# Patient Record
Sex: MALE | Race: WHITE | NOT HISPANIC OR LATINO | Employment: OTHER | URBAN - NONMETROPOLITAN AREA
[De-identification: names, ages, dates, MRNs, and addresses within clinical notes are randomized per-mention and may not be internally consistent; named-entity substitution may affect disease eponyms.]

---

## 2018-07-09 ENCOUNTER — OFFICE VISIT CONVERTED (OUTPATIENT)
Dept: FAMILY MEDICINE CLINIC | Age: 83
End: 2018-07-09
Attending: FAMILY MEDICINE

## 2019-05-03 ENCOUNTER — HOSPITAL ENCOUNTER (OUTPATIENT)
Dept: OTHER | Facility: HOSPITAL | Age: 84
Discharge: HOME | End: 2019-05-03
Attending: FAMILY MEDICINE

## 2019-05-03 ENCOUNTER — OFFICE VISIT CONVERTED (OUTPATIENT)
Dept: FAMILY MEDICINE CLINIC | Age: 84
End: 2019-05-03
Attending: FAMILY MEDICINE

## 2019-05-03 LAB
CHOLEST SERPL-MCNC: 241 MG/DL (ref 107–200)
CHOLEST/HDLC SERPL: 5 {RATIO} (ref 3–6)
EST. AVERAGE GLUCOSE BLD GHB EST-MCNC: 126 MG/DL
FOLATE SERPL-MCNC: 15.3 NG/ML (ref 4.8–20)
HBA1C MFR BLD: 6 % (ref 3.5–5.7)
HDLC SERPL-MCNC: 48 MG/DL (ref 40–60)
LDLC SERPL CALC-MCNC: 167 MG/DL (ref 70–100)
TRIGL SERPL-MCNC: 132 MG/DL (ref 40–150)
TSH SERPL-ACNC: 1.74 M[IU]/L (ref 0.27–4.2)
VIT B12 SERPL-MCNC: 242 PG/ML (ref 211–911)
VLDLC SERPL-MCNC: 26 MG/DL (ref 5–37)

## 2019-05-05 LAB — CONV TREPONEMA PALLIDUM (RPR) WITH FTA-ABS, TP-PA REFLEXES: NON REACTIVE

## 2019-07-01 ENCOUNTER — OFFICE VISIT CONVERTED (OUTPATIENT)
Dept: FAMILY MEDICINE CLINIC | Age: 84
End: 2019-07-01
Attending: FAMILY MEDICINE

## 2019-10-01 ENCOUNTER — HOSPITAL ENCOUNTER (OUTPATIENT)
Dept: OTHER | Facility: HOSPITAL | Age: 84
Discharge: HOME | End: 2019-10-01
Attending: FAMILY MEDICINE

## 2019-10-01 ENCOUNTER — OFFICE VISIT CONVERTED (OUTPATIENT)
Dept: FAMILY MEDICINE CLINIC | Age: 84
End: 2019-10-01
Attending: FAMILY MEDICINE

## 2019-10-01 LAB
ALBUMIN SERPL-MCNC: 4.1 G/DL (ref 3.5–5)
ALBUMIN/GLOB SERPL: 1.2 {RATIO} (ref 1.4–2.6)
ALP SERPL-CCNC: 65 U/L (ref 56–155)
ALT SERPL-CCNC: 15 U/L (ref 10–40)
ANION GAP SERPL CALC-SCNC: 21 MMOL/L (ref 8–19)
AST SERPL-CCNC: 23 U/L (ref 15–50)
BILIRUB SERPL-MCNC: 0.39 MG/DL (ref 0.2–1.3)
BUN SERPL-MCNC: 23 MG/DL (ref 5–25)
BUN/CREAT SERPL: 14 {RATIO} (ref 6–20)
CALCIUM SERPL-MCNC: 10 MG/DL (ref 8.7–10.4)
CHLORIDE SERPL-SCNC: 101 MMOL/L (ref 99–111)
CHOLEST SERPL-MCNC: 190 MG/DL (ref 107–200)
CHOLEST/HDLC SERPL: 4.2 {RATIO} (ref 3–6)
CONV CO2: 24 MMOL/L (ref 22–32)
CONV TOTAL PROTEIN: 7.4 G/DL (ref 6.3–8.2)
CREAT UR-MCNC: 1.64 MG/DL (ref 0.7–1.2)
GFR SERPLBLD BASED ON 1.73 SQ M-ARVRAT: 37 ML/MIN/{1.73_M2}
GLOBULIN UR ELPH-MCNC: 3.3 G/DL (ref 2–3.5)
GLUCOSE SERPL-MCNC: 99 MG/DL (ref 70–99)
HDLC SERPL-MCNC: 45 MG/DL (ref 40–60)
LDLC SERPL CALC-MCNC: 104 MG/DL (ref 70–100)
OSMOLALITY SERPL CALC.SUM OF ELEC: 294 MOSM/KG (ref 273–304)
POTASSIUM SERPL-SCNC: 5.6 MMOL/L (ref 3.5–5.3)
SODIUM SERPL-SCNC: 140 MMOL/L (ref 135–147)
TRIGL SERPL-MCNC: 204 MG/DL (ref 40–150)
VLDLC SERPL-MCNC: 41 MG/DL (ref 5–37)

## 2019-10-29 ENCOUNTER — OFFICE VISIT CONVERTED (OUTPATIENT)
Dept: FAMILY MEDICINE CLINIC | Age: 84
End: 2019-10-29
Attending: FAMILY MEDICINE

## 2019-12-02 ENCOUNTER — OFFICE VISIT CONVERTED (OUTPATIENT)
Dept: FAMILY MEDICINE CLINIC | Age: 84
End: 2019-12-02
Attending: FAMILY MEDICINE

## 2019-12-02 ENCOUNTER — HOSPITAL ENCOUNTER (OUTPATIENT)
Dept: OTHER | Facility: HOSPITAL | Age: 84
Discharge: HOME | End: 2019-12-02
Attending: FAMILY MEDICINE

## 2019-12-02 LAB
ALBUMIN SERPL-MCNC: 4.1 G/DL (ref 3.5–5)
ALBUMIN/GLOB SERPL: 1.1 {RATIO} (ref 1.4–2.6)
ALP SERPL-CCNC: 72 U/L (ref 56–155)
ALT SERPL-CCNC: 9 U/L (ref 10–40)
ANION GAP SERPL CALC-SCNC: 19 MMOL/L (ref 8–19)
AST SERPL-CCNC: 18 U/L (ref 15–50)
BILIRUB SERPL-MCNC: 0.23 MG/DL (ref 0.2–1.3)
BUN SERPL-MCNC: 26 MG/DL (ref 5–25)
BUN/CREAT SERPL: 19 {RATIO} (ref 6–20)
CALCIUM SERPL-MCNC: 9.4 MG/DL (ref 8.7–10.4)
CHLORIDE SERPL-SCNC: 101 MMOL/L (ref 99–111)
CHOLEST SERPL-MCNC: 250 MG/DL (ref 107–200)
CHOLEST/HDLC SERPL: 5.4 {RATIO} (ref 3–6)
CONV CO2: 26 MMOL/L (ref 22–32)
CONV TOTAL PROTEIN: 7.7 G/DL (ref 6.3–8.2)
CREAT UR-MCNC: 1.4 MG/DL (ref 0.7–1.2)
GFR SERPLBLD BASED ON 1.73 SQ M-ARVRAT: 45 ML/MIN/{1.73_M2}
GLOBULIN UR ELPH-MCNC: 3.6 G/DL (ref 2–3.5)
GLUCOSE SERPL-MCNC: 111 MG/DL (ref 70–99)
HDLC SERPL-MCNC: 46 MG/DL (ref 40–60)
LDLC SERPL CALC-MCNC: 180 MG/DL (ref 70–100)
OSMOLALITY SERPL CALC.SUM OF ELEC: 297 MOSM/KG (ref 273–304)
POTASSIUM SERPL-SCNC: 4.5 MMOL/L (ref 3.5–5.3)
SODIUM SERPL-SCNC: 141 MMOL/L (ref 135–147)
TRIGL SERPL-MCNC: 120 MG/DL (ref 40–150)
VLDLC SERPL-MCNC: 24 MG/DL (ref 5–37)

## 2020-03-31 ENCOUNTER — OFFICE VISIT CONVERTED (OUTPATIENT)
Dept: FAMILY MEDICINE CLINIC | Age: 85
End: 2020-03-31
Attending: FAMILY MEDICINE

## 2020-05-26 ENCOUNTER — HOSPITAL ENCOUNTER (OUTPATIENT)
Dept: OTHER | Facility: HOSPITAL | Age: 85
Discharge: HOME | End: 2020-05-26
Attending: FAMILY MEDICINE

## 2020-05-26 ENCOUNTER — OFFICE VISIT CONVERTED (OUTPATIENT)
Dept: FAMILY MEDICINE CLINIC | Age: 85
End: 2020-05-26
Attending: FAMILY MEDICINE

## 2020-05-26 LAB
ALBUMIN SERPL-MCNC: 4.2 G/DL (ref 3.5–5)
ALBUMIN/GLOB SERPL: 1.2 {RATIO} (ref 1.4–2.6)
ALP SERPL-CCNC: 71 U/L (ref 56–155)
ALT SERPL-CCNC: 10 U/L (ref 10–40)
ANION GAP SERPL CALC-SCNC: 15 MMOL/L (ref 8–19)
AST SERPL-CCNC: 20 U/L (ref 15–50)
BASOPHILS # BLD MANUAL: 0.11 10*3/UL (ref 0–0.2)
BASOPHILS NFR BLD MANUAL: 1.8 % (ref 0–3)
BILIRUB SERPL-MCNC: 0.29 MG/DL (ref 0.2–1.3)
BUN SERPL-MCNC: 20 MG/DL (ref 5–25)
BUN/CREAT SERPL: 12 {RATIO} (ref 6–20)
CALCIUM SERPL-MCNC: 9.5 MG/DL (ref 8.7–10.4)
CHLORIDE SERPL-SCNC: 103 MMOL/L (ref 99–111)
CHOLEST SERPL-MCNC: 223 MG/DL (ref 107–200)
CHOLEST/HDLC SERPL: 5.6 {RATIO} (ref 3–6)
CK SERPL-CCNC: 124 U/L (ref 57–374)
CONV CO2: 27 MMOL/L (ref 22–32)
CONV TOTAL PROTEIN: 7.7 G/DL (ref 6.3–8.2)
CREAT UR-MCNC: 1.61 MG/DL (ref 0.7–1.2)
DEPRECATED RDW RBC AUTO: 49.6 FL
EOSINOPHIL # BLD MANUAL: 0.6 10*3/UL (ref 0–0.7)
EOSINOPHIL NFR BLD MANUAL: 9.6 % (ref 0–7)
ERYTHROCYTE [DISTWIDTH] IN BLOOD BY AUTOMATED COUNT: 14.1 % (ref 11.5–14.5)
EST. AVERAGE GLUCOSE BLD GHB EST-MCNC: 151 MG/DL
GFR SERPLBLD BASED ON 1.73 SQ M-ARVRAT: 38 ML/MIN/{1.73_M2}
GLOBULIN UR ELPH-MCNC: 3.5 G/DL (ref 2–3.5)
GLUCOSE SERPL-MCNC: 99 MG/DL (ref 70–99)
GRANS (ABSOLUTE): 3.82 10*3/UL (ref 2–8)
GRANS: 60.8 % (ref 30–85)
HBA1C MFR BLD: 13.2 G/DL (ref 14–18)
HBA1C MFR BLD: 6.9 % (ref 3.5–5.7)
HCT VFR BLD AUTO: 41.1 % (ref 42–52)
HDLC SERPL-MCNC: 40 MG/DL (ref 40–60)
IMM GRANULOCYTES # BLD: 0.01 10*3/UL (ref 0–0.54)
IMM GRANULOCYTES NFR BLD: 0.2 % (ref 0–0.43)
LDLC SERPL CALC-MCNC: 147 MG/DL (ref 70–100)
LYMPHOCYTES # BLD MANUAL: 1.12 10*3/UL (ref 1–5)
LYMPHOCYTES NFR BLD MANUAL: 9.7 % (ref 3–10)
MCH RBC QN AUTO: 30.6 PG (ref 27–31)
MCHC RBC AUTO-ENTMCNC: 32.1 G/DL (ref 33–37)
MCV RBC AUTO: 95.1 FL (ref 80–96)
MONOCYTES # BLD AUTO: 0.61 10*3/UL (ref 0.2–1.2)
OSMOLALITY SERPL CALC.SUM OF ELEC: 293 MOSM/KG (ref 273–304)
PLATELET # BLD AUTO: 196 10*3/UL (ref 130–400)
PMV BLD AUTO: 9.1 FL (ref 7.4–10.4)
POTASSIUM SERPL-SCNC: 5.1 MMOL/L (ref 3.5–5.3)
RBC # BLD AUTO: 4.32 10*6/UL (ref 4.7–6.1)
SODIUM SERPL-SCNC: 140 MMOL/L (ref 135–147)
TRIGL SERPL-MCNC: 182 MG/DL (ref 40–150)
VARIANT LYMPHS NFR BLD MANUAL: 17.9 % (ref 20–45)
VLDLC SERPL-MCNC: 36 MG/DL (ref 5–37)
WBC # BLD AUTO: 6.27 10*3/UL (ref 4.8–10.8)

## 2020-05-27 LAB
ASO AB SERPL-ACNC: 20 [IU]/ML (ref 0–200)
CONV RHEUMATOID FACTOR IGM: <10 [IU]/ML (ref 0–14)
CRP SERPL-MCNC: 3.3 MG/L (ref 0–5)
DSDNA AB SER-ACNC: NEGATIVE [IU]/ML
ENA AB SER IA-ACNC: NEGATIVE {RATIO}
ERYTHROCYTE [SEDIMENTATION RATE] IN BLOOD: 16 MM/H (ref 0–20)
PHOSPHATE SERPL-MCNC: 3.8 MG/DL (ref 2.4–4.5)
URATE SERPL-MCNC: 5 MG/DL (ref 3.5–8.5)

## 2020-07-06 ENCOUNTER — OFFICE VISIT CONVERTED (OUTPATIENT)
Dept: PLASTIC SURGERY | Facility: CLINIC | Age: 85
End: 2020-07-06
Attending: PLASTIC SURGERY

## 2020-07-06 ENCOUNTER — CONVERSION ENCOUNTER (OUTPATIENT)
Dept: PLASTIC SURGERY | Facility: CLINIC | Age: 85
End: 2020-07-06

## 2020-07-17 ENCOUNTER — OFFICE VISIT CONVERTED (OUTPATIENT)
Dept: FAMILY MEDICINE CLINIC | Age: 85
End: 2020-07-17
Attending: FAMILY MEDICINE

## 2020-07-30 ENCOUNTER — PROCEDURE VISIT CONVERTED (OUTPATIENT)
Dept: PLASTIC SURGERY | Facility: CLINIC | Age: 85
End: 2020-07-30
Attending: PLASTIC SURGERY

## 2020-07-30 ENCOUNTER — HOSPITAL ENCOUNTER (OUTPATIENT)
Dept: OTHER | Facility: HOSPITAL | Age: 85
Discharge: HOME | End: 2020-07-30
Attending: PLASTIC SURGERY

## 2020-08-06 ENCOUNTER — CONVERSION ENCOUNTER (OUTPATIENT)
Dept: PLASTIC SURGERY | Facility: CLINIC | Age: 85
End: 2020-08-06

## 2020-08-06 ENCOUNTER — OFFICE VISIT CONVERTED (OUTPATIENT)
Dept: PLASTIC SURGERY | Facility: CLINIC | Age: 85
End: 2020-08-06
Attending: PLASTIC SURGERY

## 2020-09-28 ENCOUNTER — CONVERSION ENCOUNTER (OUTPATIENT)
Dept: PLASTIC SURGERY | Facility: CLINIC | Age: 85
End: 2020-09-28

## 2020-09-28 ENCOUNTER — OFFICE VISIT CONVERTED (OUTPATIENT)
Dept: PLASTIC SURGERY | Facility: CLINIC | Age: 85
End: 2020-09-28
Attending: PLASTIC SURGERY

## 2020-10-28 ENCOUNTER — HOSPITAL ENCOUNTER (OUTPATIENT)
Dept: OTHER | Facility: HOSPITAL | Age: 85
Discharge: HOME | End: 2020-10-28
Attending: NURSE PRACTITIONER

## 2020-10-28 ENCOUNTER — OFFICE VISIT CONVERTED (OUTPATIENT)
Dept: FAMILY MEDICINE CLINIC | Age: 85
End: 2020-10-28
Attending: NURSE PRACTITIONER

## 2020-11-02 ENCOUNTER — HOSPITAL ENCOUNTER (OUTPATIENT)
Dept: OTHER | Facility: HOSPITAL | Age: 85
Discharge: HOME | End: 2020-11-02
Attending: FAMILY MEDICINE

## 2020-11-02 ENCOUNTER — OFFICE VISIT CONVERTED (OUTPATIENT)
Dept: FAMILY MEDICINE CLINIC | Age: 85
End: 2020-11-02
Attending: FAMILY MEDICINE

## 2020-11-02 LAB
ALBUMIN SERPL-MCNC: 4 G/DL (ref 3.5–5)
ALBUMIN/GLOB SERPL: 1.3 {RATIO} (ref 1.4–2.6)
ALP SERPL-CCNC: 75 U/L (ref 56–155)
ALT SERPL-CCNC: 11 U/L (ref 10–40)
ANION GAP SERPL CALC-SCNC: 13 MMOL/L (ref 8–19)
AST SERPL-CCNC: 21 U/L (ref 15–50)
BILIRUB SERPL-MCNC: 0.33 MG/DL (ref 0.2–1.3)
BUN SERPL-MCNC: 31 MG/DL (ref 5–25)
BUN/CREAT SERPL: 21 {RATIO} (ref 6–20)
CALCIUM SERPL-MCNC: 9.7 MG/DL (ref 8.7–10.4)
CHLORIDE SERPL-SCNC: 105 MMOL/L (ref 99–111)
CHOLEST SERPL-MCNC: 160 MG/DL (ref 107–200)
CHOLEST/HDLC SERPL: 3.5 {RATIO} (ref 3–6)
CONV CO2: 29 MMOL/L (ref 22–32)
CONV TOTAL PROTEIN: 7.2 G/DL (ref 6.3–8.2)
CREAT UR-MCNC: 1.45 MG/DL (ref 0.7–1.2)
EST. AVERAGE GLUCOSE BLD GHB EST-MCNC: 163 MG/DL
GFR SERPLBLD BASED ON 1.73 SQ M-ARVRAT: 43 ML/MIN/{1.73_M2}
GLOBULIN UR ELPH-MCNC: 3.2 G/DL (ref 2–3.5)
GLUCOSE SERPL-MCNC: 102 MG/DL (ref 70–99)
HBA1C MFR BLD: 7.3 % (ref 3.5–5.7)
HDLC SERPL-MCNC: 46 MG/DL (ref 40–60)
LDLC SERPL CALC-MCNC: 85 MG/DL (ref 70–100)
OSMOLALITY SERPL CALC.SUM OF ELEC: 301 MOSM/KG (ref 273–304)
POTASSIUM SERPL-SCNC: 5.2 MMOL/L (ref 3.5–5.3)
SODIUM SERPL-SCNC: 142 MMOL/L (ref 135–147)
TRIGL SERPL-MCNC: 145 MG/DL (ref 40–150)
VLDLC SERPL-MCNC: 29 MG/DL (ref 5–37)

## 2020-11-09 ENCOUNTER — OFFICE VISIT (OUTPATIENT)
Dept: ORTHOPEDIC SURGERY | Facility: CLINIC | Age: 85
End: 2020-11-09

## 2020-11-09 VITALS — BODY MASS INDEX: 25.2 KG/M2 | WEIGHT: 180 LBS | HEIGHT: 71 IN | TEMPERATURE: 98 F

## 2020-11-09 DIAGNOSIS — M25.511 ACUTE PAIN OF RIGHT SHOULDER: Primary | ICD-10-CM

## 2020-11-09 DIAGNOSIS — M25.511 TRIGGER POINT OF RIGHT SHOULDER REGION: ICD-10-CM

## 2020-11-09 PROCEDURE — 20610 DRAIN/INJ JOINT/BURSA W/O US: CPT | Performed by: PHYSICIAN ASSISTANT

## 2020-11-09 PROCEDURE — 99203 OFFICE O/P NEW LOW 30 MIN: CPT | Performed by: PHYSICIAN ASSISTANT

## 2020-11-09 RX ORDER — TRAMADOL HYDROCHLORIDE 50 MG/1
TABLET ORAL
Qty: 40 TABLET | Refills: 0 | Status: SHIPPED | OUTPATIENT
Start: 2020-11-09 | End: 2021-07-28 | Stop reason: SDUPTHER

## 2020-11-09 RX ORDER — PRAVASTATIN SODIUM 40 MG
TABLET ORAL
COMMUNITY
Start: 2020-08-10 | End: 2021-01-01

## 2020-11-09 RX ORDER — ALLOPURINOL 100 MG/1
100 TABLET ORAL DAILY
COMMUNITY
Start: 2020-08-24 | End: 2021-09-08

## 2020-11-09 RX ORDER — TRAMADOL HYDROCHLORIDE 50 MG/1
TABLET ORAL
COMMUNITY
Start: 2020-10-28 | End: 2020-11-09 | Stop reason: SDUPTHER

## 2020-11-09 RX ORDER — BUMETANIDE 0.5 MG/1
0.5 TABLET ORAL DAILY
COMMUNITY
Start: 2020-08-10 | End: 2021-09-08 | Stop reason: SDUPTHER

## 2020-11-09 RX ORDER — AMLODIPINE BESYLATE 5 MG/1
TABLET ORAL
COMMUNITY
Start: 2020-08-10 | End: 2021-09-20 | Stop reason: DRUGHIGH

## 2020-11-09 RX ORDER — HYDROXYZINE HYDROCHLORIDE 25 MG/1
TABLET, FILM COATED ORAL
COMMUNITY
Start: 2020-11-04 | End: 2021-01-01

## 2020-11-09 RX ORDER — CLOPIDOGREL BISULFATE 75 MG/1
75 TABLET ORAL DAILY
COMMUNITY
Start: 2020-08-18 | End: 2022-01-01 | Stop reason: SDUPTHER

## 2020-11-09 RX ORDER — METOPROLOL SUCCINATE 50 MG/1
50 TABLET, EXTENDED RELEASE ORAL DAILY
COMMUNITY
Start: 2020-08-10 | End: 2021-01-01 | Stop reason: SDUPTHER

## 2020-11-09 RX ORDER — QUETIAPINE FUMARATE 25 MG/1
0.5 TABLET, FILM COATED ORAL NIGHTLY
COMMUNITY
Start: 2020-11-02 | End: 2021-01-01 | Stop reason: SDUPTHER

## 2020-11-09 RX ORDER — GABAPENTIN 300 MG/1
300 CAPSULE ORAL 3 TIMES DAILY
COMMUNITY
Start: 2020-11-03 | End: 2021-01-01 | Stop reason: SDUPTHER

## 2020-11-09 RX ADMIN — METHYLPREDNISOLONE ACETATE 160 MG: 80 INJECTION, SUSPENSION INTRA-ARTICULAR; INTRALESIONAL; INTRAMUSCULAR; SOFT TISSUE at 09:58

## 2020-11-10 PROBLEM — M25.511 ACUTE PAIN OF RIGHT SHOULDER: Status: ACTIVE | Noted: 2020-11-10

## 2020-11-10 PROBLEM — M25.511 TRIGGER POINT OF RIGHT SHOULDER REGION: Status: ACTIVE | Noted: 2020-11-10

## 2020-11-10 RX ORDER — METHYLPREDNISOLONE ACETATE 80 MG/ML
80 INJECTION, SUSPENSION INTRA-ARTICULAR; INTRALESIONAL; INTRAMUSCULAR; SOFT TISSUE
Status: COMPLETED | OUTPATIENT
Start: 2020-11-10 | End: 2020-11-10

## 2020-11-10 RX ORDER — METHYLPREDNISOLONE ACETATE 80 MG/ML
160 INJECTION, SUSPENSION INTRA-ARTICULAR; INTRALESIONAL; INTRAMUSCULAR; SOFT TISSUE
Status: COMPLETED | OUTPATIENT
Start: 2020-11-09 | End: 2020-11-09

## 2020-11-10 RX ORDER — LIDOCAINE HYDROCHLORIDE 20 MG/ML
1 INJECTION, SOLUTION INTRAVENOUS
Status: COMPLETED | OUTPATIENT
Start: 2020-11-10 | End: 2020-11-10

## 2020-11-10 RX ADMIN — METHYLPREDNISOLONE ACETATE 80 MG: 80 INJECTION, SUSPENSION INTRA-ARTICULAR; INTRALESIONAL; INTRAMUSCULAR; SOFT TISSUE at 05:19

## 2020-11-10 RX ADMIN — LIDOCAINE HYDROCHLORIDE 1 ML: 20 INJECTION, SOLUTION INTRAVENOUS at 05:19

## 2021-01-01 ENCOUNTER — OFFICE VISIT (OUTPATIENT)
Dept: FAMILY MEDICINE CLINIC | Age: 86
End: 2021-01-01

## 2021-01-01 ENCOUNTER — TELEPHONE (OUTPATIENT)
Dept: FAMILY MEDICINE CLINIC | Age: 86
End: 2021-01-01

## 2021-01-01 ENCOUNTER — PATIENT OUTREACH (OUTPATIENT)
Dept: CASE MANAGEMENT | Facility: OTHER | Age: 86
End: 2021-01-01

## 2021-01-01 ENCOUNTER — TELEPHONE (OUTPATIENT)
Dept: CASE MANAGEMENT | Facility: OTHER | Age: 86
End: 2021-01-01

## 2021-01-01 ENCOUNTER — READMISSION MANAGEMENT (OUTPATIENT)
Dept: CALL CENTER | Facility: HOSPITAL | Age: 86
End: 2021-01-01

## 2021-01-01 ENCOUNTER — TRANSITIONAL CARE MANAGEMENT TELEPHONE ENCOUNTER (OUTPATIENT)
Dept: CALL CENTER | Facility: HOSPITAL | Age: 86
End: 2021-01-01

## 2021-01-01 ENCOUNTER — LAB (OUTPATIENT)
Dept: LAB | Facility: HOSPITAL | Age: 86
End: 2021-01-01

## 2021-01-01 VITALS
TEMPERATURE: 97.7 F | OXYGEN SATURATION: 91 % | WEIGHT: 192 LBS | DIASTOLIC BLOOD PRESSURE: 75 MMHG | BODY MASS INDEX: 26.01 KG/M2 | SYSTOLIC BLOOD PRESSURE: 147 MMHG | HEART RATE: 69 BPM | HEIGHT: 72 IN

## 2021-01-01 VITALS
WEIGHT: 197.6 LBS | BODY MASS INDEX: 26.76 KG/M2 | DIASTOLIC BLOOD PRESSURE: 61 MMHG | SYSTOLIC BLOOD PRESSURE: 144 MMHG | OXYGEN SATURATION: 88 % | HEIGHT: 72 IN | TEMPERATURE: 97.4 F | HEART RATE: 61 BPM

## 2021-01-01 VITALS
DIASTOLIC BLOOD PRESSURE: 57 MMHG | HEIGHT: 72 IN | OXYGEN SATURATION: 96 % | BODY MASS INDEX: 24.81 KG/M2 | WEIGHT: 183.2 LBS | HEART RATE: 91 BPM | SYSTOLIC BLOOD PRESSURE: 132 MMHG

## 2021-01-01 DIAGNOSIS — I50.9 CONGESTIVE HEART FAILURE, UNSPECIFIED HF CHRONICITY, UNSPECIFIED HEART FAILURE TYPE (HCC): Primary | ICD-10-CM

## 2021-01-01 DIAGNOSIS — G30.9 ALZHEIMER DISEASE (HCC): ICD-10-CM

## 2021-01-01 DIAGNOSIS — E78.2 MIXED HYPERLIPIDEMIA: ICD-10-CM

## 2021-01-01 DIAGNOSIS — I73.9 PAD (PERIPHERAL ARTERY DISEASE) (HCC): ICD-10-CM

## 2021-01-01 DIAGNOSIS — N17.9 ACUTE RENAL FAILURE SUPERIMPOSED ON STAGE 4 CHRONIC KIDNEY DISEASE, UNSPECIFIED ACUTE RENAL FAILURE TYPE (HCC): Primary | ICD-10-CM

## 2021-01-01 DIAGNOSIS — F02.80 ALZHEIMER DISEASE (HCC): ICD-10-CM

## 2021-01-01 DIAGNOSIS — N18.30 STAGE 3 CHRONIC KIDNEY DISEASE, UNSPECIFIED WHETHER STAGE 3A OR 3B CKD (HCC): Primary | ICD-10-CM

## 2021-01-01 DIAGNOSIS — R91.1 LUNG NODULE: ICD-10-CM

## 2021-01-01 DIAGNOSIS — I10 HYPERTENSION, UNSPECIFIED TYPE: Primary | ICD-10-CM

## 2021-01-01 DIAGNOSIS — Z87.891 HISTORY OF SMOKING: ICD-10-CM

## 2021-01-01 DIAGNOSIS — N18.30 STAGE 3 CHRONIC KIDNEY DISEASE, UNSPECIFIED WHETHER STAGE 3A OR 3B CKD (HCC): ICD-10-CM

## 2021-01-01 DIAGNOSIS — N18.32 STAGE 3B CHRONIC KIDNEY DISEASE (HCC): Primary | ICD-10-CM

## 2021-01-01 DIAGNOSIS — I10 PRIMARY HYPERTENSION: ICD-10-CM

## 2021-01-01 DIAGNOSIS — E11.59 TYPE 2 DIABETES MELLITUS WITH OTHER CIRCULATORY COMPLICATION, WITHOUT LONG-TERM CURRENT USE OF INSULIN (HCC): ICD-10-CM

## 2021-01-01 DIAGNOSIS — I50.9 CHRONIC CONGESTIVE HEART FAILURE, UNSPECIFIED HEART FAILURE TYPE (HCC): ICD-10-CM

## 2021-01-01 DIAGNOSIS — N18.31 STAGE 3A CHRONIC KIDNEY DISEASE (HCC): Primary | ICD-10-CM

## 2021-01-01 DIAGNOSIS — N18.31 STAGE 3A CHRONIC KIDNEY DISEASE (HCC): ICD-10-CM

## 2021-01-01 DIAGNOSIS — M25.511 ACUTE PAIN OF RIGHT SHOULDER: ICD-10-CM

## 2021-01-01 DIAGNOSIS — E11.9 TYPE 2 DIABETES MELLITUS WITHOUT COMPLICATION, WITHOUT LONG-TERM CURRENT USE OF INSULIN (HCC): ICD-10-CM

## 2021-01-01 DIAGNOSIS — N28.9 KIDNEY DISEASE: Primary | ICD-10-CM

## 2021-01-01 DIAGNOSIS — G62.9 PERIPHERAL NERVE DISEASE: Primary | ICD-10-CM

## 2021-01-01 DIAGNOSIS — R06.02 SHORTNESS OF BREATH: Primary | ICD-10-CM

## 2021-01-01 DIAGNOSIS — I50.9 CONGESTIVE HEART FAILURE, UNSPECIFIED HF CHRONICITY, UNSPECIFIED HEART FAILURE TYPE (HCC): ICD-10-CM

## 2021-01-01 DIAGNOSIS — I10 HYPERTENSION, UNSPECIFIED TYPE: ICD-10-CM

## 2021-01-01 DIAGNOSIS — N18.4 ACUTE RENAL FAILURE SUPERIMPOSED ON STAGE 4 CHRONIC KIDNEY DISEASE, UNSPECIFIED ACUTE RENAL FAILURE TYPE (HCC): Primary | ICD-10-CM

## 2021-01-01 LAB
ALBUMIN SERPL-MCNC: 3.6 G/DL (ref 3.5–5.2)
ALBUMIN SERPL-MCNC: 4 G/DL (ref 3.5–5.2)
ALBUMIN/GLOB SERPL: 1 G/DL
ALBUMIN/GLOB SERPL: 1.2 G/DL
ALP SERPL-CCNC: 89 U/L (ref 39–117)
ALP SERPL-CCNC: 93 U/L (ref 39–117)
ALT SERPL W P-5'-P-CCNC: 15 U/L (ref 1–41)
ALT SERPL W P-5'-P-CCNC: 17 U/L (ref 1–41)
ANION GAP SERPL CALCULATED.3IONS-SCNC: 10.8 MMOL/L (ref 5–15)
ANION GAP SERPL CALCULATED.3IONS-SCNC: 5.2 MMOL/L (ref 5–15)
ANION GAP SERPL CALCULATED.3IONS-SCNC: 7.5 MMOL/L (ref 5–15)
AST SERPL-CCNC: 19 U/L (ref 1–40)
AST SERPL-CCNC: 20 U/L (ref 1–40)
BASOPHILS # BLD AUTO: 0.08 10*3/MM3 (ref 0–0.2)
BASOPHILS # BLD AUTO: 0.08 10*3/MM3 (ref 0–0.2)
BASOPHILS NFR BLD AUTO: 0.9 % (ref 0–1.5)
BASOPHILS NFR BLD AUTO: 1.2 % (ref 0–1.5)
BILIRUB SERPL-MCNC: 0.2 MG/DL (ref 0–1.2)
BILIRUB SERPL-MCNC: 0.2 MG/DL (ref 0–1.2)
BUN SERPL-MCNC: 34 MG/DL (ref 8–23)
BUN SERPL-MCNC: 41 MG/DL (ref 8–23)
BUN SERPL-MCNC: 47 MG/DL (ref 8–23)
BUN/CREAT SERPL: 21.1 (ref 7–25)
BUN/CREAT SERPL: 22 (ref 7–25)
BUN/CREAT SERPL: 22.3 (ref 7–25)
CALCIUM SPEC-SCNC: 9 MG/DL (ref 8.6–10.5)
CALCIUM SPEC-SCNC: 9.2 MG/DL (ref 8.6–10.5)
CALCIUM SPEC-SCNC: 9.6 MG/DL (ref 8.6–10.5)
CHLORIDE SERPL-SCNC: 102 MMOL/L (ref 98–107)
CHLORIDE SERPL-SCNC: 103 MMOL/L (ref 98–107)
CHLORIDE SERPL-SCNC: 108 MMOL/L (ref 98–107)
CHOLEST SERPL-MCNC: 145 MG/DL (ref 0–200)
CO2 SERPL-SCNC: 30.5 MMOL/L (ref 22–29)
CO2 SERPL-SCNC: 31.2 MMOL/L (ref 22–29)
CO2 SERPL-SCNC: 33.8 MMOL/L (ref 22–29)
CREAT SERPL-MCNC: 1.61 MG/DL (ref 0.76–1.27)
CREAT SERPL-MCNC: 1.86 MG/DL (ref 0.76–1.27)
CREAT SERPL-MCNC: 2.11 MG/DL (ref 0.76–1.27)
DEPRECATED RDW RBC AUTO: 51.5 FL (ref 37–54)
DEPRECATED RDW RBC AUTO: 53.2 FL (ref 37–54)
EOSINOPHIL # BLD AUTO: 0.58 10*3/MM3 (ref 0–0.4)
EOSINOPHIL # BLD AUTO: 0.71 10*3/MM3 (ref 0–0.4)
EOSINOPHIL NFR BLD AUTO: 10.4 % (ref 0.3–6.2)
EOSINOPHIL NFR BLD AUTO: 6.7 % (ref 0.3–6.2)
ERYTHROCYTE [DISTWIDTH] IN BLOOD BY AUTOMATED COUNT: 14.1 % (ref 12.3–15.4)
ERYTHROCYTE [DISTWIDTH] IN BLOOD BY AUTOMATED COUNT: 15.1 % (ref 12.3–15.4)
GFR SERPL CREATININE-BSD FRML MDRD: 30 ML/MIN/1.73
GFR SERPL CREATININE-BSD FRML MDRD: 34 ML/MIN/1.73
GFR SERPL CREATININE-BSD FRML MDRD: 41 ML/MIN/1.73
GLOBULIN UR ELPH-MCNC: 3.3 GM/DL
GLOBULIN UR ELPH-MCNC: 3.6 GM/DL
GLUCOSE SERPL-MCNC: 136 MG/DL (ref 65–99)
GLUCOSE SERPL-MCNC: 156 MG/DL (ref 65–99)
GLUCOSE SERPL-MCNC: 222 MG/DL (ref 65–99)
HBA1C MFR BLD: 7 % (ref 4.8–5.6)
HBA1C MFR BLD: 7.47 % (ref 4.8–5.6)
HCT VFR BLD AUTO: 39.5 % (ref 37.5–51)
HCT VFR BLD AUTO: 41.1 % (ref 37.5–51)
HDLC SERPL-MCNC: 38 MG/DL (ref 40–60)
HGB BLD-MCNC: 12.1 G/DL (ref 13–17.7)
HGB BLD-MCNC: 13 G/DL (ref 13–17.7)
IMM GRANULOCYTES # BLD AUTO: 0.02 10*3/MM3 (ref 0–0.05)
IMM GRANULOCYTES # BLD AUTO: 0.02 10*3/MM3 (ref 0–0.05)
IMM GRANULOCYTES NFR BLD AUTO: 0.2 % (ref 0–0.5)
IMM GRANULOCYTES NFR BLD AUTO: 0.3 % (ref 0–0.5)
LDLC SERPL CALC-MCNC: 74 MG/DL (ref 0–100)
LDLC/HDLC SERPL: 1.78 {RATIO}
LYMPHOCYTES # BLD AUTO: 0.78 10*3/MM3 (ref 0.7–3.1)
LYMPHOCYTES # BLD AUTO: 0.96 10*3/MM3 (ref 0.7–3.1)
LYMPHOCYTES NFR BLD AUTO: 11.1 % (ref 19.6–45.3)
LYMPHOCYTES NFR BLD AUTO: 11.4 % (ref 19.6–45.3)
MCH RBC QN AUTO: 30.2 PG (ref 26.6–33)
MCH RBC QN AUTO: 30.5 PG (ref 26.6–33)
MCHC RBC AUTO-ENTMCNC: 30.6 G/DL (ref 31.5–35.7)
MCHC RBC AUTO-ENTMCNC: 31.6 G/DL (ref 31.5–35.7)
MCV RBC AUTO: 95.6 FL (ref 79–97)
MCV RBC AUTO: 99.5 FL (ref 79–97)
MONOCYTES # BLD AUTO: 0.85 10*3/MM3 (ref 0.1–0.9)
MONOCYTES # BLD AUTO: 0.86 10*3/MM3 (ref 0.1–0.9)
MONOCYTES NFR BLD AUTO: 12.6 % (ref 5–12)
MONOCYTES NFR BLD AUTO: 9.8 % (ref 5–12)
NEUTROPHILS NFR BLD AUTO: 4.4 10*3/MM3 (ref 1.7–7)
NEUTROPHILS NFR BLD AUTO: 6.16 10*3/MM3 (ref 1.7–7)
NEUTROPHILS NFR BLD AUTO: 64.1 % (ref 42.7–76)
NEUTROPHILS NFR BLD AUTO: 71.3 % (ref 42.7–76)
NT-PROBNP SERPL-MCNC: 375.1 PG/ML (ref 0–1800)
PLATELET # BLD AUTO: 163 10*3/MM3 (ref 140–450)
PLATELET # BLD AUTO: 222 10*3/MM3 (ref 140–450)
PMV BLD AUTO: 10.3 FL (ref 6–12)
PMV BLD AUTO: 10.6 FL (ref 6–12)
POTASSIUM SERPL-SCNC: 4.9 MMOL/L (ref 3.5–5.2)
POTASSIUM SERPL-SCNC: 4.9 MMOL/L (ref 3.5–5.2)
POTASSIUM SERPL-SCNC: 5 MMOL/L (ref 3.5–5.2)
PROT SERPL-MCNC: 7.2 G/DL (ref 6–8.5)
PROT SERPL-MCNC: 7.3 G/DL (ref 6–8.5)
RBC # BLD AUTO: 3.97 10*6/MM3 (ref 4.14–5.8)
RBC # BLD AUTO: 4.3 10*6/MM3 (ref 4.14–5.8)
SODIUM SERPL-SCNC: 142 MMOL/L (ref 136–145)
SODIUM SERPL-SCNC: 144 MMOL/L (ref 136–145)
SODIUM SERPL-SCNC: 146 MMOL/L (ref 136–145)
TRIGL SERPL-MCNC: 197 MG/DL (ref 0–150)
VLDLC SERPL-MCNC: 33 MG/DL (ref 5–40)
WBC # BLD AUTO: 8.65 10*3/MM3 (ref 3.4–10.8)
WBC NRBC COR # BLD: 6.85 10*3/MM3 (ref 3.4–10.8)

## 2021-01-01 PROCEDURE — 83036 HEMOGLOBIN GLYCOSYLATED A1C: CPT

## 2021-01-01 PROCEDURE — 99439 CHRNC CARE MGMT STAF EA ADDL: CPT | Performed by: FAMILY MEDICINE

## 2021-01-01 PROCEDURE — 99213 OFFICE O/P EST LOW 20 MIN: CPT | Performed by: NURSE PRACTITIONER

## 2021-01-01 PROCEDURE — 99495 TRANSJ CARE MGMT MOD F2F 14D: CPT | Performed by: FAMILY MEDICINE

## 2021-01-01 PROCEDURE — 1111F DSCHRG MED/CURRENT MED MERGE: CPT | Performed by: FAMILY MEDICINE

## 2021-01-01 PROCEDURE — 80061 LIPID PANEL: CPT

## 2021-01-01 PROCEDURE — 80053 COMPREHEN METABOLIC PANEL: CPT

## 2021-01-01 PROCEDURE — 36415 COLL VENOUS BLD VENIPUNCTURE: CPT

## 2021-01-01 PROCEDURE — 85025 COMPLETE CBC W/AUTO DIFF WBC: CPT | Performed by: FAMILY MEDICINE

## 2021-01-01 PROCEDURE — 99214 OFFICE O/P EST MOD 30 MIN: CPT | Performed by: FAMILY MEDICINE

## 2021-01-01 PROCEDURE — 85025 COMPLETE CBC W/AUTO DIFF WBC: CPT

## 2021-01-01 PROCEDURE — 80048 BASIC METABOLIC PNL TOTAL CA: CPT

## 2021-01-01 PROCEDURE — 36415 COLL VENOUS BLD VENIPUNCTURE: CPT | Performed by: FAMILY MEDICINE

## 2021-01-01 PROCEDURE — 80053 COMPREHEN METABOLIC PANEL: CPT | Performed by: FAMILY MEDICINE

## 2021-01-01 PROCEDURE — 99490 CHRNC CARE MGMT STAFF 1ST 20: CPT | Performed by: FAMILY MEDICINE

## 2021-01-01 PROCEDURE — 83880 ASSAY OF NATRIURETIC PEPTIDE: CPT

## 2021-01-01 RX ORDER — GABAPENTIN 300 MG/1
300 CAPSULE ORAL 3 TIMES DAILY
Status: CANCELLED | OUTPATIENT
Start: 2021-01-01

## 2021-01-01 RX ORDER — GABAPENTIN 300 MG/1
300 CAPSULE ORAL DAILY
Qty: 14 CAPSULE | Refills: 0 | Status: SHIPPED | OUTPATIENT
Start: 2021-01-01 | End: 2022-01-01 | Stop reason: SDDI

## 2021-01-01 RX ORDER — TRAMADOL HYDROCHLORIDE 50 MG/1
TABLET ORAL
Qty: 30 TABLET | Refills: 0 | Status: SHIPPED | OUTPATIENT
Start: 2021-01-01 | End: 2022-01-01

## 2021-01-01 RX ORDER — QUETIAPINE FUMARATE 25 MG/1
12.5 TABLET, FILM COATED ORAL NIGHTLY
Qty: 14 TABLET | Refills: 2 | Status: SHIPPED | OUTPATIENT
Start: 2021-01-01 | End: 2022-01-01 | Stop reason: SDUPTHER

## 2021-01-01 RX ORDER — TRAMADOL HYDROCHLORIDE 50 MG/1
TABLET ORAL
Qty: 30 TABLET | Refills: 0 | Status: SHIPPED | OUTPATIENT
Start: 2021-01-01 | End: 2021-01-01

## 2021-01-01 RX ORDER — ESCITALOPRAM OXALATE 5 MG/1
5 TABLET ORAL DAILY
Qty: 90 TABLET | Refills: 0 | Status: SHIPPED | OUTPATIENT
Start: 2021-01-01 | End: 2022-01-01 | Stop reason: SDUPTHER

## 2021-01-01 RX ORDER — TRAMADOL HYDROCHLORIDE 50 MG/1
TABLET ORAL
Qty: 30 TABLET | Refills: 0 | Status: CANCELLED | OUTPATIENT
Start: 2021-01-01

## 2021-01-01 RX ORDER — ASPIRIN 81 MG/1
81 TABLET ORAL DAILY
Qty: 28 TABLET | Refills: 2 | Status: SHIPPED | OUTPATIENT
Start: 2021-01-01 | End: 2022-01-01 | Stop reason: SDUPTHER

## 2021-01-01 RX ORDER — METOPROLOL SUCCINATE 25 MG/1
25 TABLET, EXTENDED RELEASE ORAL DAILY
COMMUNITY
End: 2022-01-01 | Stop reason: SDUPTHER

## 2021-01-01 RX ORDER — BUMETANIDE 1 MG/1
1 TABLET ORAL DAILY
COMMUNITY
Start: 2021-01-01 | End: 2022-01-01 | Stop reason: DRUGHIGH

## 2021-01-01 RX ORDER — MEMANTINE HYDROCHLORIDE 10 MG/1
10 TABLET ORAL 2 TIMES DAILY
Qty: 180 TABLET | Refills: 0 | Status: SHIPPED | OUTPATIENT
Start: 2021-01-01 | End: 2022-01-01 | Stop reason: SDUPTHER

## 2021-01-01 RX ORDER — VALSARTAN 320 MG/1
320 TABLET ORAL DAILY
Qty: 90 TABLET | Refills: 0 | Status: SHIPPED | OUTPATIENT
Start: 2021-01-01 | End: 2021-01-01 | Stop reason: ALTCHOICE

## 2021-01-01 RX ORDER — GABAPENTIN 300 MG/1
300 CAPSULE ORAL 3 TIMES DAILY
Qty: 90 CAPSULE | Refills: 0 | OUTPATIENT
Start: 2021-01-01

## 2021-01-01 RX ORDER — BUMETANIDE 0.5 MG/1
0.5 TABLET ORAL DAILY
COMMUNITY
End: 2021-01-01

## 2021-01-01 RX ORDER — DONEPEZIL HYDROCHLORIDE 10 MG/1
10 TABLET, FILM COATED ORAL DAILY
Qty: 90 TABLET | Refills: 0 | Status: SHIPPED | OUTPATIENT
Start: 2021-01-01 | End: 2022-01-01 | Stop reason: SDUPTHER

## 2021-01-01 RX ORDER — METOPROLOL SUCCINATE 50 MG/1
50 TABLET, EXTENDED RELEASE ORAL DAILY
Qty: 90 TABLET | Refills: 0 | Status: SHIPPED | OUTPATIENT
Start: 2021-01-01 | End: 2021-01-01 | Stop reason: ALTCHOICE

## 2021-01-01 RX ORDER — AMLODIPINE BESYLATE 2.5 MG/1
2.5 TABLET ORAL NIGHTLY
Qty: 28 TABLET | Refills: 2 | Status: SHIPPED | OUTPATIENT
Start: 2021-01-01 | End: 2022-01-01 | Stop reason: SDUPTHER

## 2021-01-28 NOTE — NUR
1/28/21 Mr. Cummins lives with his spouse. He has caregivers 5 days per week
for 4 hours. The 4 children assist on the weekend. He has a rw. A referral was
made to [facility]  per spouse's request; affliation explained. Discharge is
anticipated for 1/29.

## 2021-01-29 NOTE — NUR
1/29/21 Mr. Cummins will be transferred to [facility]. [facility] was
informed.

## 2021-05-10 NOTE — H&P
"   History and Physical      Patient Name: Blaine Morales   Patient ID: [id number]   Sex: Male   YOB: 1933    Primary Care Provider: Rosario Wall MD   Referring Provider: Rosario Wall MD    Visit Date: July 6, 2020    Provider: Radha Leung MD   Location: [facility]   Location Address: [address] [place] [address]  [id number]   Location Phone: [phone]          History Of Present Illness  Blaine Morales is a 86 year old /White male who presents to the office today as a consult from Rosario Wall MD. he has a biopsy-proven right cheek melanoma in situ with a history of back melanoma status post wide local excision in the 90s. He is here today for consult for excision.       Past Medical History  Alzheimer disease; Diabetes; Diverticulitis; Elevated prostate specific antigen (PSA); Elevated PSA; Hyperlipidemia; Hypertension; Kidney Disease; Melanoma; Sinus trouble; Systolic murmur         Past Surgical History  Back surgery; Carotid Stenosis; Melanoma excision; Triple coronary bypass         Medication List  amlodipine-benazepril oral; aspirin oral; azithromycin oral; hydrochlorothiazide oral; Lotrel oral; metformin oral; pravastatin oral         Allergy List  Ceclor         Family Medical History  Stroke; Heart Disease; Melanoma; Multiple myeloma; Diabetes         Social History  Tobacco (Former)         Review of Systems  · Cardiovascular  o Denies  o : chest pain, lower extremity edema, Heart Attack, Abnormal EKG  · Respiratory  o Denies  o : shortness of breath, wheezing, cough  · Neurologic  o Denies  o : muscular weakness, incoordination, tingling or numbness, headaches  · Psychiatric  o Denies  o : anxiety, depression, difficulty sleeping      Vitals  Date Time BP Position Site L\R Cuff Size HR RR TEMP (F) WT  HT  BMI kg/m2 BSA m2 O2 Sat HC       07/06/2020 11:03 /79 Sitting    65 - R  97.8  5'  11.5\"   98 %  "         Physical Examination  · Constitutional  o Appearance  o : well developed, well-nourished, Alert and oriented X3  · Head and Face  o HEENT  o : 6 mm right inferior cheek pigmented macule with central scarring from biopsy site  · Eyes  o Sclerae  o : sclerae white  · Respiratory  o Respiratory Effort  o : breathing unlabored   · Cardiovascular  o Heart  o : regular rate  · Lymphatic  o Axilla  o : No lymphadenopathy present  · Skin and Subcutaneous Tissue  o General Inspection  o : no lesions present, no areas of discoloration, skin turgor normal, texture normal  · Psychiatric  o Judgement and Insight  o : judgment and insight intact  o Mood and Affect  o : mood normal, affect appropriate          Assessment  · Melanoma in situ     172.9/D03.9    Problems Reconciled  Plan  · Orders  o Plastics reconstructive visit (PSREC) - - 07/06/2020  · Medications  o Medications have been Reconciled  o Transition of Care or Provider Policy  · Instructions  o Patient has biopsy-proven right cheek melanoma in situ. We discussed wide local excision in the office under local anesthesia. We discussed scarring and numbness.  o Electronically Identified Patient Education Materials Provided Electronically            Electronically Signed by: Radha Leung MD -Author on July 6, 2020 11:39:23 AM

## 2021-05-12 ENCOUNTER — HOSPITAL ENCOUNTER (OUTPATIENT)
Dept: OTHER | Facility: HOSPITAL | Age: 86
Discharge: HOME | End: 2021-05-12
Attending: NURSE PRACTITIONER

## 2021-05-12 ENCOUNTER — OFFICE VISIT CONVERTED (OUTPATIENT)
Dept: FAMILY MEDICINE CLINIC | Age: 86
End: 2021-05-12
Attending: NURSE PRACTITIONER

## 2021-05-12 LAB
ALBUMIN SERPL-MCNC: 3.7 G/DL (ref 3.5–5)
ALBUMIN/GLOB SERPL: 1.1 {RATIO} (ref 1.4–2.6)
ALP SERPL-CCNC: 121 U/L (ref 56–155)
ALT SERPL-CCNC: 27 U/L (ref 10–40)
ANION GAP SERPL CALC-SCNC: 13 MMOL/L (ref 8–19)
AST SERPL-CCNC: 28 U/L (ref 15–50)
BASOPHILS # BLD MANUAL: 0.06 10*3/UL (ref 0–0.2)
BASOPHILS NFR BLD MANUAL: 0.6 % (ref 0–3)
BILIRUB SERPL-MCNC: 0.17 MG/DL (ref 0.2–1.3)
BNP SERPL-MCNC: 511 PG/ML (ref 0–1800)
BUN SERPL-MCNC: 33 MG/DL (ref 5–25)
BUN/CREAT SERPL: 19 {RATIO} (ref 6–20)
CALCIUM SERPL-MCNC: 9.1 MG/DL (ref 8.7–10.4)
CHLORIDE SERPL-SCNC: 109 MMOL/L (ref 99–111)
CONV CO2: 28 MMOL/L (ref 22–32)
CONV TOTAL PROTEIN: 7.2 G/DL (ref 6.3–8.2)
CREAT UR-MCNC: 1.71 MG/DL (ref 0.7–1.2)
DEPRECATED RDW RBC AUTO: 52.7 FL
EOSINOPHIL # BLD MANUAL: 0.67 10*3/UL (ref 0–0.7)
EOSINOPHIL NFR BLD MANUAL: 7.1 % (ref 0–7)
ERYTHROCYTE [DISTWIDTH] IN BLOOD BY AUTOMATED COUNT: 14.1 % (ref 11.5–14.5)
EST. AVERAGE GLUCOSE BLD GHB EST-MCNC: 163 MG/DL
GFR SERPLBLD BASED ON 1.73 SQ M-ARVRAT: 35 ML/MIN/{1.73_M2}
GLOBULIN UR ELPH-MCNC: 3.5 G/DL (ref 2–3.5)
GLUCOSE SERPL-MCNC: 126 MG/DL (ref 70–99)
GRANS (ABSOLUTE): 6.62 10*3/UL (ref 2–8)
GRANS: 69.8 % (ref 30–85)
HBA1C MFR BLD: 10.9 G/DL (ref 14–18)
HBA1C MFR BLD: 7.3 % (ref 3.5–5.7)
HCT VFR BLD AUTO: 35.2 % (ref 42–52)
IMM GRANULOCYTES # BLD: 0.04 10*3/UL (ref 0–0.54)
IMM GRANULOCYTES NFR BLD: 0.4 % (ref 0–0.43)
LYMPHOCYTES # BLD MANUAL: 1.15 10*3/UL (ref 1–5)
LYMPHOCYTES NFR BLD MANUAL: 10 % (ref 3–10)
MCH RBC QN AUTO: 31.2 PG (ref 27–31)
MCHC RBC AUTO-ENTMCNC: 31 G/DL (ref 33–37)
MCV RBC AUTO: 100.9 FL (ref 80–96)
MONOCYTES # BLD AUTO: 0.95 10*3/UL (ref 0.2–1.2)
OSMOLALITY SERPL CALC.SUM OF ELEC: 309 MOSM/KG (ref 273–304)
PLATELET # BLD AUTO: 207 10*3/UL (ref 130–400)
PMV BLD AUTO: 10.9 FL (ref 7.4–10.4)
POTASSIUM SERPL-SCNC: 5 MMOL/L (ref 3.5–5.3)
RBC # BLD AUTO: 3.49 10*6/UL (ref 4.7–6.1)
SODIUM SERPL-SCNC: 145 MMOL/L (ref 135–147)
VARIANT LYMPHS NFR BLD MANUAL: 12.1 % (ref 20–45)
WBC # BLD AUTO: 9.49 10*3/UL (ref 4.8–10.8)

## 2021-05-13 NOTE — PROGRESS NOTES
Progress Note      Patient Name: Blaine Morales   Patient ID: [id number]   Sex: Male   YOB: 1933    Primary Care Provider: Rosario Wall MD   Referring Provider: Rosario Wall MD    Visit Date: September 28, 2020    Provider: Radha Leung MD   Location[place] [facility]   Location Address: [address] [place] [address]  [id number]   Location Phone: [phone]          History Of Present Illness  Blaine Morales is a 86 year old /White male who presents to the office today as a consult from Rosario Wall MD. he has a biopsy-proven right cheek melanoma in situ with a history of back melanoma status post wide local excision in the 90s. He is here today for consult for excision.      Patient here today for scar check. He and daughter are pleased with results       Past Medical History  Alzheimer disease; Diabetes; Diverticulitis; Elevated Prostate Specific Antigen (PSA); Elevated PSA; Hyperlipidemia; Hypertension; Kidney Disease; Melanoma; Sinus trouble; Systolic murmur         Past Surgical History  Back surgery; Carotid Stenosis; Melanoma excision; Triple coronary bypass         Medication List  amlodipine-benazepril oral; aspirin oral; azithromycin oral; hydrochlorothiazide oral; Lotrel oral; metformin oral; pravastatin oral         Allergy List  Ceclor         Family Medical History  Stroke; Heart Disease; Melanoma; Multiple myeloma; Diabetes         Social History  Tobacco (Former)         Vitals  Date Time BP Position Site L\R Cuff Size HR RR TEMP (F) WT  HT  BMI kg/m2 BSA m2 O2 Sat        09/28/2020 11:00 /72 Sitting    60 - R  97     95 %          Physical Examination  · Constitutional  o Appearance  o : well developed, well-nourished, Alert and oriented X3  · Head and Face  o HEENT  o : incision well healed with good contour  · Eyes  o Sclerae  o : sclerae white  · Respiratory  o Respiratory Effort  o : breathing  unlabored   · Cardiovascular  o Heart  o : regular rate  · Lymphatic  o Axilla  o : No lymphadenopathy present  · Skin and Subcutaneous Tissue  o General Inspection  o : no lesions present, no areas of discoloration, skin turgor normal, texture normal  · Psychiatric  o Judgement and Insight  o : judgment and insight intact  o Mood and Affect  o : mood normal, affect appropriate          Assessment  · Melanoma in situ     172.9/D03.9      Plan  · Medications  o Medications have been Reconciled  o Transition of Care or Provider Policy  · Instructions  o scar massage, sunscreen, maintain follow up with derm, rtc prn            Electronically Signed by: Radha Leung MD -Author on September 28, 2020 12:55:46 PM

## 2021-05-13 NOTE — PROGRESS NOTES
"   Progress Note      Patient Name: Blaine Morales   Patient ID: [id number]   Sex: Male   YOB: 1933    Primary Care Provider: Rosario Wlal MD   Referring Provider: Rosario Wall MD    Visit Date: August 6, 2020    Provider: Radha Leung MD   Location: [facility]   Location Address: [address] [place] [address]  [id number]   Location Phone: [phone]          Chief Complaint  · suture removal      History Of Present Illness  Blaine Morales is a 86 year old /White male who presents to the office today as a consult from Rosario Wall MD. he has a biopsy-proven right cheek melanoma in situ with a history of back melanoma status post wide local excision in the 90s. He is here today for consult for excision.      Patient here today for suture removal. Pathology is not back yet. Request to just be called with pathology if possible.       Past Medical History  Alzheimer disease; Diabetes; Diverticulitis; Elevated Prostate Specific Antigen (PSA); Elevated PSA; Hyperlipidemia; Hypertension; Kidney Disease; Melanoma; Sinus trouble; Systolic murmur         Past Surgical History  Back surgery; Carotid Stenosis; Melanoma excision; Triple coronary bypass         Medication List  amlodipine-benazepril oral; aspirin oral; azithromycin oral; hydrochlorothiazide oral; Lotrel oral; metformin oral; pravastatin oral         Allergy List  Ceclor       Allergies Reconciled  Family Medical History  Stroke; Heart Disease; Melanoma; Multiple myeloma; Diabetes         Social History  Tobacco (Former)         Vitals  Date Time BP Position Site L\R Cuff Size HR RR TEMP (F) WT  HT  BMI kg/m2 BSA m2 O2 Sat        08/06/2020 01:29 /73 Sitting    63 - R  97.5  5'  11\"   97 %          Physical Examination  · Constitutional  o Appearance  o : well developed, well-nourished, Alert and oriented X3  · Head and Face  o HEENT  o : healing incision, edges " approximated, no openings, no drainage  · Eyes  o Sclerae  o : sclerae white  · Respiratory  o Respiratory Effort  o : breathing unlabored   · Cardiovascular  o Heart  o : regular rate  · Lymphatic  o Axilla  o : No lymphadenopathy present  · Skin and Subcutaneous Tissue  o General Inspection  o : no lesions present, no areas of discoloration, skin turgor normal, texture normal  · Psychiatric  o Judgement and Insight  o : judgment and insight intact  o Mood and Affect  o : mood normal, affect appropriate          Assessment  · Melanoma     172.9/C43.9  · Melanoma in situ     172.9/D03.9      Plan  · Orders  o Plastics reconstructive visit (PSREC) - - 08/06/2020  · Medications  o Medications have been Reconciled  o Transition of Care or Provider Policy  · Instructions  o Sutures removed. Aquaphor daily and Scar massage (Starting in one week). Will call with patho results. RTC if needed.             Electronically Signed by: Radha Leung MD -Author on August 6, 2020 04:53:02 PM

## 2021-05-13 NOTE — PROGRESS NOTES
Progress Note      Patient Name: Blaine Morales   Patient ID: [id number]   Sex: Male   YOB: 1933    Primary Care Provider: Rosario Wall MD   Referring Provider: Rosario Wall MD    Visit Date: July 30, 2020    Provider: Radha Leung MD   Location: [facility]   Location Address: [address] [place] [address]  [id number]   Location Phone: [phone]          History Of Present Illness  Blaine Morales is a 86 year old /White male who presents to the office today as a consult from Rosario Wall MD. he has a biopsy-proven right cheek melanoma in situ with a history of back melanoma status post wide local excision in the 90s. He is here today for consult for excision.      Patient here today for excision of right cheek melanoma in situ .       Past Medical History  Alzheimer disease; Diabetes; Diverticulitis; Elevated Prostate Specific Antigen (PSA); Elevated PSA; Hyperlipidemia; Hypertension; Kidney Disease; Melanoma; Sinus trouble; Systolic murmur         Past Surgical History  Back surgery; Carotid Stenosis; Melanoma excision; Triple coronary bypass         Medication List  amlodipine-benazepril oral; aspirin oral; azithromycin oral; hydrochlorothiazide oral; Lotrel oral; metformin oral; pravastatin oral         Allergy List  Ceclor       Allergies Reconciled  Family Medical History  Stroke; Heart Disease; Melanoma; Multiple myeloma; Diabetes         Social History  Tobacco (Former)         Vitals  Date Time BP Position Site L\R Cuff Size HR RR TEMP (F) WT  HT  BMI kg/m2 BSA m2 O2 Sat        07/30/2020 10:43 /69 Sitting    56 - R  97.2     95 %          Physical Examination  · Constitutional  o Appearance  o : well developed, well-nourished, Alert and oriented X3  · Head and Face  o HEENT  o : 6 mm right inferior cheek pigmented macule with central scarring from biopsy site  · Eyes  o Sclerae  o : sclerae  white  · Respiratory  o Respiratory Effort  o : breathing unlabored   · Cardiovascular  o Heart  o : regular rate  · Lymphatic  o Axilla  o : No lymphadenopathy present  · Skin and Subcutaneous Tissue  o General Inspection  o : no lesions present, no areas of discoloration, skin turgor normal, texture normal  · Psychiatric  o Judgement and Insight  o : judgment and insight intact  o Mood and Affect  o : mood normal, affect appropriate          Assessment  · Melanoma     172.9/C43.9  · Melanoma in situ     172.9/D03.9      Plan  · Orders  o Plastics reconstructive visit (PSREC) - - 07/30/2020  o Malignant skin lesion excision (31600) - - 07/30/2020  o Intermediate layered closure of wound, 2.6 cm to 7.5 cm (17667) - - 07/30/2020  · Medications  o Medications have been Reconciled  o Transition of Care or Provider Policy  · Instructions  o Consent obtained. Local injected to site, Lidocaine 1% with epi. Area prepped with chloroprep in sterile fashion. Site draped in sterile fashion. I dissected down through skin and subcutaneous tissue completely around lesion, sent from field for pathology. Established hemostasis with direct pressure. Site was thoroughly irrigated. Site closed with 4-0 monocryl in a subcutaneous fashion and deep dermal fashion to obliterate dead space, then with 5-0 nylon in an interrupted fashion. Site cleaned with sterile normal saline. Bacitracin applied. The patient tolerated the procedure well with no immediate complications. Post procedure instructions given. Excision length 3 cm.   o Electronically Identified Patient Education Materials Provided Electronically            Electronically Signed by: Radha Leung MD -Author on July 30, 2020 12:35:30 PM

## 2021-05-14 VITALS
OXYGEN SATURATION: 95 % | TEMPERATURE: 97 F | HEART RATE: 60 BPM | DIASTOLIC BLOOD PRESSURE: 72 MMHG | SYSTOLIC BLOOD PRESSURE: 182 MMHG

## 2021-05-15 VITALS
HEIGHT: 71 IN | SYSTOLIC BLOOD PRESSURE: 144 MMHG | HEART RATE: 63 BPM | OXYGEN SATURATION: 97 % | TEMPERATURE: 97.5 F | DIASTOLIC BLOOD PRESSURE: 73 MMHG

## 2021-05-15 VITALS
HEART RATE: 56 BPM | DIASTOLIC BLOOD PRESSURE: 69 MMHG | SYSTOLIC BLOOD PRESSURE: 151 MMHG | OXYGEN SATURATION: 95 % | TEMPERATURE: 97.2 F

## 2021-05-15 VITALS
DIASTOLIC BLOOD PRESSURE: 79 MMHG | HEART RATE: 65 BPM | OXYGEN SATURATION: 98 % | SYSTOLIC BLOOD PRESSURE: 158 MMHG | TEMPERATURE: 97.8 F | HEIGHT: 71 IN

## 2021-05-18 NOTE — PROGRESS NOTES
Blaine Morales  10/07/1933     Office/Outpatient Visit    Visit Date: Wed, May 12, 2021 10:07 am    Provider: Milena Frederick N.P. (Assistant: Sara Valentine,  )    Location: [facility]        Electronically signed by Milena Frederick N.P. on  05/12/2021 03:44:18 PM                             Subjective:        CC: AG is a 87 year old White male.  Pt present with cough and swelling in hands and feet. Pt allso states he is SOA and congestion. Pt states symptoms began 1 week ago.          HPI:           Patient to be evaluated for acute upper respiratory infection, unspecified.  These have been present for the past one week.  The symptoms include chest congestion, cough and eye itching/watering.  He denies exposure to ill contacts.  He has already tried to relieve the symptoms with Mucinex, claritin.      ROS:     CONSTITUTIONAL:  Negative for fever.      CARDIOVASCULAR:  Positive for pedal edema.   Negative for chest pain.      RESPIRATORY:  Positive for dyspnea ( with moderate exertion ).   Negative for frequent wheezing.      NEUROLOGICAL:  Negative for headaches.      PSYCHIATRIC:  Positive for feelings of stress ( (wife in Hospice care at his home) ).          Past Medical History / Family History / Social History:         Last Reviewed on 5/12/2021 10:47 AM by Milena Frederick    Past Medical History:             PAST MEDICAL HISTORY     Hospitalizations:    Pneumonia and (at [facility])    Myocardial Infarction admitted once on 1-2021         PAST MEDICAL HISTORY             CURRENT MEDICAL PROVIDERS:    Primary care provider ( Dr. OMID Allen )    Cardiologist: CATIA Spicer    Dentist: ANIYA Hong DDS    Ophthalmologist: Dr. Benites         PREVENTIVE HEALTH MAINTENANCE             COLORECTAL CANCER SCREENING: Up to date (colonoscopy q10y; sigmoidoscopy q5y; Cologuard q3y) was last done 7/22/15, Results are in chart; which was abnormal     DENTAL CLEANING: has full dentures     EYE EXAM:  been a long time     PSA: was last done  with normal results         Surgical History:         Coronary Artery Bypass Graft: Oct 2015;     Cardiac Valve Replacement: Oct 2015;     Carotid Endarterectomy: bilateral; ;     Melanoma Surgery from mid back- Dr Helms;    Subclavian artery stent 10/2015;    SFA-Pop angioplasty with stenting 2020;         Family History:     Father:  at age 73; Cause of death was MI     Mother:  at age 76; Cause of death was stroke     Brother(s): 2 brother(s) total;  Cancer (unspecified type);  Cerebrovascular Accident ( at age 75 )     Sister(s): 5 sister(s) total; 3          Social History: Retired [employer].  [employer]  Ran a [employer] in [place].  Then work [employer],  then [employer]         Tobacco/Alcohol/Supplements:     Last Reviewed on 2021 10:11 AM by Sara Valentine    Tobacco: He has a past history of cigarette smoking; quit date:  .          Alcohol: Frequency:    rare;         Substance Abuse History:     Last Reviewed on 2017 09:08 AM by Ankur Allen        Mental Health History:     Last Reviewed on 2017 09:08 AM by Ankur Allen        Communicable Diseases (eg STDs):     Last Reviewed on 2017 09:08 AM by Ankur Allen        Immunizations: no covid vaccines     influenza, high-dose, quadrivalent (FLUZONE HIGH-DOSE QUAD -) 10/28/2020    Prevnar 13 (Pneumococcal PCV 13) 2015    Pneomovax 2011    Fluzone High-Dose pf (>=65 yr) 2016    Fluzone High-Dose pf (>=65 yr) 11/3/2017    Fluzone High-Dose pf (>=65 yr) 10/24/2018    Fluzone High-Dose pf (>=65 yr) 10/1/2019    Boostrix (Tdap) 2016        Allergies:     Last Reviewed on 2021 10:09 AM by Sara Valentine    Ceclor:          Current Medications:     Last Reviewed on 2021 10:11 AM by Sara Valentine    amLODIPine 5 mg oral tablet [take 1 tablet (5 mg) by oral route once daily]     atorvastatin 80 mg oral tablet [take 1 tablet (80 mg) by oral route once daily]    metoprolol succinate 50 mg oral Tablet, Extended Release 24 hr [1 tab daily]    aspirin 81 mg oral tablet, delayed release (enteric coated) [1 tab daily]    Bumetanide 0.5 mg oral tablet [Take 1 tablet(s) by mouth daily]    allopurinoL 100 mg oral tablet [TAKE 1 TABLET DAILY]    Valsartan 160 mg oral tablet [Take 1 tablet(s) by mouth BID]    donepeziL 10 mg oral tablet [TAKE 1 TABLET(S) BY MOUTH AT BEDTIME]    Namenda 5 mg oral tablet [take 1 tablet (5 mg) by oral route 2 times per day]    QUEtiapine 25 mg oral tablet [TAKE ONE half  TABLET BY MOUTH AT BEDTIME]    traMADol 50 mg oral tablet [take 1 tablet (50 mg) by oral route QHS]    clopidogreL 75 mg oral tablet [take 1 tablet (75 mg) by oral route once daily]    gabapentin 300 mg oral capsule [take 1 capsule (300 mg) by oral route 2 times per day]    multivitamin oral tablet [take 1 tablet daily]        Objective:        Vitals:         Current: 5/12/2021 10:08:52 AM    Ht:  5 ft, 8.875 in;  Wt: 196.2 lbs;  BMI: 29.1T: 97.2 F (temporal);  BP: 124/48 mm Hg (left arm, sitting);  P: 67 bpm (left arm (BP Cuff), sitting);  sCr: 1.45 mg/dL;  GFR: 36.22O2 Sat: 94 % (room air)        Exams:     PHYSICAL EXAM:     GENERAL:  well developed and nourished; appropriately groomed; in no apparent distress;     E/N/T: EARS: bilateral TMs are normal;  NOSE: no sinus tenderness; OROPHARYNX: posterior pharynx shows clear;     RESPIRATORY: normal respiratory rate and pattern with no distress; decreased breath sounds throughout;     CARDIOVASCULAR: normal rate; rhythm is regular;  1+ pedal edema; trace swelling in hands     LYMPHATIC: no enlargement of cervical or facial nodes;     PSYCHIATRIC: affect/demeanor: quiet;         Lab/Test Results:         Rapid SARS: Negative (05/12/2021),     Performed by: tls (05/12/2021),             Assessment:         J20.9   Acute bronchitis, unspecified       R60.9    Edema, unspecified       R06.00   Dyspnea, unspecified       E11.9   Type 2 diabetes mellitus without complications           ORDERS:         Meds Prescribed:       [New Rx] doxycycline monohydrate 100 mg oral capsule [take 1 capsule (100 mg) by oral route 2 times per day], #20 (twenty) capsules, Refills: 0 (zero)         Radiology/Test Orders:       [id number]  Radiologic exam chest 2 views  (Send-Out)              Lab Orders:       [id number]  Coronavirus antigen detection by immunoassay technique, COVID-19  (In-House)            35756  A1CEG - [facility] Hemoglobin A1C  (Send-Out)            14744  NTBNP - [facility] B-Type Natriurectic peptide  (Send-Out)            24239  BDCB - [facility] CBC with 3 part diff  (Send-Out)            96569  COMP - [facility] Comp. Metabolic Panel  (Send-Out)                      Plan:         Acute bronchitis, unspecifiedreviewed with pt and daughter, Karyn, his rapid covid test is neg /[facility] in [place] is his pharmacy/Karyn  # [phone]/ will consult with his PCP        RECOMMENDATIONS given include: rest and increase oral fluid intake.      FOLLOW-UP: Advised to call if there is no improvement in 2 day(s).  LABORATORY:  Labs ordered to be performed today include CBC.            Prescriptions:       [New Rx] doxycycline monohydrate 100 mg oral capsule [take 1 capsule (100 mg) by oral route 2 times per day], #20 (twenty) capsules, Refills: 0 (zero)           Orders:       [id number]  Coronavirus antigen detection by immunoassay technique, COVID-19  (In-House)            14690  [facility] CBC with 3 part diff  (Send-Out)              Edema, unspecified    LABORATORY:  Labs ordered to be performed today include BNP.            Orders:       [id number]  NTBNP - [facility] B-Type Natriurectic peptide  (Send-Out)              Dyspnea, unspecifiednothing acute on CXR         RADIOLOGY:  I have ordered a chest x-ray (PA and lateral) to be done today.            Orders:       [id number]  Radiologic exam chest 2 views  (Send-Out)               Type 2 diabetes mellitus without complications    LABORATORY:  Labs ordered to be performed today include Comprehensive metabolic panel and HgbA1C.            Orders:       [id number]  A1CEG - [facility] Hemoglobin A1C  (Send-Out)            19510  COMP - [facility] Comp. Metabolic Panel  (Send-Out)                  Charge Capture:         Primary Diagnosis:     J20.9  Acute bronchitis, unspecified           Orders:      [id number]  Office/outpatient visit; established patient, level 4  (In-House)            10035  Coronavirus antigen detection by immunoassay technique, COVID-19  (In-House)              R60.9  Edema, unspecified     R06.00  Dyspnea, unspecified     E11.9  Type 2 diabetes mellitus without complications

## 2021-05-18 NOTE — PROGRESS NOTES
Blaine Morales 10/07/1933     Office/Outpatient Visit    Visit Date: Mon, Jul 1, 2019 09:41 am    Provider: Ankur Allen MD (Assistant: Mary Lou Quintanilla MA)    Location: [facility]        Electronically signed by Ankur Allen MD on  07/01/2019 10:23:51 AM                             SUBJECTIVE:        CC:     MEGHAN is a 85 year old White male.  This is a follow-up visit.  The patient is accompanied into the exam room by his daughter and grandaughter.          HPI:     Mr. Morales is here today with his daughter Karyn.  They state that he is doing fairly well.  He remains very functional at home.  Continues to mow the grass.  As far as his memory issues seem mostly to be short-term memory problems.  He did start on the Aricept 5 mg and tolerating that well.  His wife helps fill up his Medi-planner planner.     As far as his other chronic medical conditions he is doing fine.  Tolerates the pravastatin for his cholesterol and his last labs looked okay.  He is also taking amlodipine and valsartan  and metoprolol for his blood pressure.     ROS:     CONSTITUTIONAL:  Negative for chills, fatigue and fever.      CARDIOVASCULAR:  Negative for chest pain, orthopnea, paroxysmal nocturnal dyspnea and pedal edema.      RESPIRATORY:  Negative for dyspnea and cough.      GASTROINTESTINAL:  Negative for abdominal pain, heartburn, constipation, diarrhea, and stool changes.      GENITOURINARY:  Negative for dysuria and polyuria.      PSYCHIATRIC:  Negative for anxiety and depression.          PMH/FMH/SH:     Last Reviewed on 5/03/2019 01:04 PM by Ankur Allen    Past Medical History:             PAST MEDICAL HISTORY     Hospitalizations:    Pneumonia (at [facility])             ADVANCE DIRECTIVES: Living Will Directive, on file, signed 10-, ./pr         PAST MEDICAL HISTORY             CURRENT MEDICAL PROVIDERS:    Primary care provider ( Dr. OMID Allen )    Cardiologist: CATIA Spicer    Dentist: ANIYA  ALEAH Hong    Ophthalmologist: Dr. Benites         PREVENTIVE HEALTH MAINTENANCE             COLORECTAL CANCER SCREENING: Up to date (colonoscopy q10y; sigmoidoscopy q5y; Cologuard q3y) was last done 7/22/15, Results are in chart         Surgical History:         Coronary Artery Bypass Graft: Oct 2015;     Cardiac Valve Replacement: Oct 2015;     Carotid Endarterectomy: bilateral; ;     Melanoma Surgery from [place]- Dr Helms;    Subclavian artery stent 10/2015;         Family History:     Father:  at age 73; Cause of death was MI     Mother:  at age 76; Cause of death was stroke     Brother(s): 2 brother(s) total;  Cancer (unspecified type);  Cerebrovascular Accident ( at age 75 )     Sister(s): 5 sister(s) total; 3          Social History: Retired [employer].  [employer]  Ran a [employer] in [place].  Then work [employer],  then [employer]         Tobacco/Alcohol/Supplements:     Last Reviewed on 2019 04:11 PM by Naomy Brunson    Tobacco: He has a past history of cigarette smoking; quit date:  .          Alcohol: Frequency:    rare;         Substance Abuse History:     Last Reviewed on 2017 09:08 AM by Ankur Allen        Mental Health History:     Last Reviewed on 2017 09:08 AM by Ankur Allen        Communicable Diseases (eg STDs):     Last Reviewed on 2017 09:08 AM by Ankur Allen            Allergies:     Last Reviewed on 2019 04:11 PM by Naomy Brunson    Ceclor:        Current Medications:     Last Reviewed on 2019 04:11 PM by Naomy Brunson    Aricept 5mg Tablet Take 1 tablet(s) by mouth at bedtime     Allopurinol 100mg Tablet 1 tab daily     Pravastatin 40mg Tablet take 1 tablet daily at bedtime     Amlodipine  5mg Tablet 1 tab daily     Valsartan 160mg Tablet Take 1 tablet(s) by mouth BID     Bumetanide 1mg Tablet Take 1/2 tablet(s) by mouth daily     Aspirin (ASA) 81mg Tablets, Enteric Coated 1 tab daily      Metoprolol 50mg Tablet 1 tab daily     Glucosamine/Chondroitin 750mg/600mg Tablet 1 tab daily     Triamcinolone Acetonide 0.1% Cream APPLY AS DIRECTED         OBJECTIVE:        Vitals:         Current: 7/1/2019 9:51:29 AM    Ht:  5 ft, 8.875 in;  Wt: 169 lbs;  BMI: 25.0    T: 98.2 F (oral);  BP: 165/90 mm Hg (right arm, sitting);  P: 55 bpm (right arm (BP Cuff), sitting);  sCr: 1.39 mg/dL;  GFR: 36.74        Repeat:     9:56:04 AM     BP:   141/51mm Hg (right arm, sitting)         Exams:     PHYSICAL EXAM:     GENERAL:  well developed and nourished; appropriately groomed; in no apparent distress;     EYES: Nonicteric and with unremarkable lids, iris and pupils;     NECK: carotid exam reveals no bruits;     RESPIRATORY: normal respiratory rate and pattern with no distress; normal breath sounds with no rales, rhonchi, wheezes or rubs;     CARDIOVASCULAR: normal rate; rhythm is regular;  no systolic murmur;     LYMPHATIC: no enlargement of cervical or facial nodes;     MUSCULOSKELETAL: normal overall tone No pedal edema.;     NEUROLOGIC: No lateralizing deficit.;     PSYCHIATRIC: He is very functional conversant.  In conversation one would never know that he had memory issues;         ASSESSMENT           331.0   G30.1  Alzheimer's dementia              DDx:     401.1   I10  Hypertension              DDx:     272.2   E78.2  Mixed hyperlipidemia              DDx:     V79.0   Z13.89  Screening for depression              DDx:         ORDERS:         Meds Prescribed:       Refill of: Aricept (Donepezil HCl) 10mg Tablet Take 1 tablet(s) by mouth at bedtime  #90 (Ninety) tablet(s) Refills: 0         Other Orders:         Depression screen negative  (In-House)                   PLAN:          Alzheimer's dementia           Prescriptions:       Refill of: Aricept (Donepezil HCl) 10mg Tablet Take 1 tablet(s) by mouth at bedtime  #90 (Ninety) tablet(s) Refills: 0          Screening for depression     MIPS PHQ-9  Depression Screening: Completed form scanned and in chart; Total Score 0; Negative Depression Screen           Orders:         Depression screen negative  (In-House)               Patient Recommendations:    Dragon transcription disclaimer:        Much of this encounter note is an electronic transcription/translation of spoken language to printed text.  The electronic translation of spoken language may permit erroneous, or at times, nonsensical words or phrases to be inadvertently transcribed.  Although I have reviewed the note for such errors, some may still exist.             CHARGE CAPTURE           **Please note: ICD descriptions below are intended for billing purposes only and may not represent clinical diagnoses**        Primary Diagnosis:         331.0 Alzheimer's dementia            G30.1    Alzheimer's disease with late onset              Orders:          03611   Office/outpatient visit; established patient, level 3  (In-House)           401.1 Hypertension            I10    Essential (primary) hypertension    272.2 Mixed hyperlipidemia            E78.2    Mixed hyperlipidemia    V79.0 Screening for depression            Z13.89    Encounter for screening for other disorder              Orders:             Depression screen negative  (In-House)               ADDENDUMS:      ____________________________________    Addendum: 07/01/2019 10:38 SINGH - Mary Lou Quintanilla        please add code v79.0 depression 0, alcohol 0/ael

## 2021-05-18 NOTE — PROGRESS NOTES
Blaine Morales  10/07/1933     Office/Outpatient Visit    Visit Date: Mon, Dec 2, 2019 11:50 am    Provider: Ankru Allen MD (Assistant: Spurling, Sarah C, MA)    Location: [facility]        Electronically signed by Ankur Allen MD on  12/02/2019 04:47:42 PM                             Subjective:        CC: AG is a 86 year old White male.  This is a follow-up visit.  bp Mr. Morales is accompanied by his daughter, Karyn.  Karyn detailed a new system that she developed with her father to help him keep compliant with his medications.  She believes that he has been considerably more successful at taking his medicationas as scheduled missing maybe 4 doses of a medication last week.        HPI:           Additionally, he presents with history of essential (primary) hypertension.  compliance with treatment has been good.  He is tolerating the medication well without side effects.  AG does not check his blood pressure other than at his clinic appointments.  His BP here is much better now that the family is monitoring his compliance more closely.            Additionally, he presents with history of mixed hyperlipidemia.  compliance with treatment has been good.  He specifically denies associated symptoms, including muscle pain and weakness.        Daughter and patient both are of the opinion that patient's memory has been stable and have not noticed a decline in functioning since previous visit.He is having less agitation since the change in the phone number eliminated the threatening calls.       He was found to have a drop off in his renal function studies on last lab, so we do want to follow up on that today.     ROS:     CONSTITUTIONAL:  Negative for chills, fatigue and fever.      CARDIOVASCULAR:  Negative for chest pain, orthopnea, paroxysmal nocturnal dyspnea and pedal edema.      RESPIRATORY:  Negative for dyspnea and cough.      GASTROINTESTINAL:  Negative for abdominal pain, heartburn,  constipation, diarrhea, and stool changes.      GENITOURINARY:  Negative for dysuria and polyuria.      PSYCHIATRIC:  Negative for anxiety and depression.          Past Medical History / Family History / Social History:         Last Reviewed on 10/01/2019 12:14 PM by Ankur Allen    Past Medical History:             PAST MEDICAL HISTORY     Hospitalizations:    Pneumonia (at [facility])             ADVANCE DIRECTIVES: Living Will Directive, on file, signed 10-, ./pr         PAST MEDICAL HISTORY             CURRENT MEDICAL PROVIDERS:    Primary care provider ( Dr. OMID Allen )    Cardiologist: CATIA Spcier    Dentist: ANIYA Hong DDS    Ophthalmologist: Dr. Benites         PREVENTIVE HEALTH MAINTENANCE             COLORECTAL CANCER SCREENING: Up to date (colonoscopy q10y; sigmoidoscopy q5y; Cologuard q3y) was last done 7/22/15, Results are in chart; which was abnormal     DENTAL CLEANING: has full dentures     EYE EXAM: been a long time     PSA: was last done  with normal results         Surgical History:         Coronary Artery Bypass Graft: Oct 2015;     Cardiac Valve Replacement: Oct 2015;     Carotid Endarterectomy: bilateral; ;     Melanoma Surgery from [facility] back- Dr Helms;    Subclavian artery stent 10/2015;         Family History:     Father:  at age 73; Cause of death was MI     Mother:  at age 76; Cause of death was stroke     Brother(s): 2 brother(s) total;  Cancer (unspecified type);  Cerebrovascular Accident ( at age 75 )     Sister(s): 5 sister(s) total; 3          Social History: Retired [employer].  [employer]  Ran a [employer] in [place].  Then work [employer],  then [employer]         Tobacco/Alcohol/Supplements:     Last Reviewed on 10/29/2019 03:30 PM by Naomy Brunson    Tobacco: He has a past history of cigarette smoking; quit date:  .          Alcohol: Frequency:    rare;         Substance Abuse History:     Last Reviewed on 2017  09:08 AM by Ankur Allen        Mental Health History:     Last Reviewed on 5/31/2017 09:08 AM by Ankur Allen        Communicable Diseases (eg STDs):     Last Reviewed on 5/31/2017 09:08 AM by Ankur Allen        Allergies:     Last Reviewed on 10/29/2019 01:10 PM by Mary Lou Quintanilla:          Current Medications:     Last Reviewed on 10/29/2019 03:30 PM by Naomy Brunson    Pravastatin 40mg Tablet [take 1 tablet daily at bedtime]    Aspirin (ASA) 81mg Tablets, Enteric Coated [1 tab daily]    Metoprolol 50mg Tablets, Extended Release [1 tab daily]    Bumetanide 0.5mg Tablet [Take 1 tablet(s) by mouth daily]    Allopurinol 100mg Tablet [1 tab daily ]    Amlodipine  5mg Tablet [1 tab po daily]    Valsartan 160mg Tablet [Take 1 tablet(s) by mouth BID]    Triamcinolone Acetonide 0.1% Cream [APPLY AS DIRECTED ]    Glucosamine/Chondroitin 750mg/600mg Tablet [1 tab daily]    Aricept 10mg Tablet [Take 1 tablet(s) by mouth at bedtime]        Objective:        Vitals:         Current: 12/2/2019 12:03:38 PM    Ht:  5 ft, 8.875 in;  Wt: 171.2 lbs;  BMI: 25.4T: 97.7 F (oral);  BP: 130/46 mm Hg (right arm, standing);  P: 53 bpm (right arm (BP Cuff), sitting);  sCr: 1.64 mg/dL;  GFR: 30.76        Exams:     PHYSICAL EXAM:     GENERAL: well developed, well nourished;  well groomed;  no apparent distress;     EYES: anicteric;     E/N/T:  normal EACs, TMs, nasal/oral mucosa, teeth, gingiva, and oropharynx;     NECK: thyroid exam reveals no discrete nodules;  carotid exam reveals transmitted murmur;     RESPIRATORY: normal appearance and symmetric expansion of chest wall; normal respiratory rate and pattern with no distress; normal breath sounds with no rales, rhonchi, wheezes or rubs;     CARDIOVASCULAR: normal rate; rhythm is regular;  a systolic murmur is noted: it is grade 3/6;  no cyanosis; no cyanosis; 1+ pedal edema;     LYMPHATIC: no enlargement of cervical or facial nodes; no supraclavicular  nodes;     NEUROLOGIC: mental status: alert and oriented x 3; some memory issues- looks to his daughter to confirm/give guidance when answering questions.     PSYCHIATRIC: appropriate affect and demeanor; normal psychomotor function; normal speech pattern;         Assessment:         G30.1   Alzheimer's disease with late onset       I10   Essential (primary) hypertension       E78.2   Mixed hyperlipidemia       N18.3   Chronic kidney disease, stage 3 (moderate)       E11.9   Type 2 diabetes mellitus without complications           ORDERS:         Meds Prescribed:       [New Rx] Namenda 5 mg oral tablet [take 1 tablet (5 mg) by oral route 2 times per day], #60 (sixty) tablets, Refills: 2 (two)         Lab Orders:       [id number]  HTN - [facility] CMP AND LIPID: [id number]  (Send-Out)                      Plan:         Alzheimer's disease with late onsetDiscussed starting additional Alzheimer's medication with patient and daughter, reviewing potential benefits and side effects.  They are aware that patient may discontinue med anytime if not well-tolerated. will add on Mamentine.        Plan to see patient in 3 months and administer neurocognitive exam at that time.          Prescriptions:       [New Rx] Namenda 5 mg oral tablet [take 1 tablet (5 mg) by oral route 2 times per day], #60 (sixty) tablets, Refills: 2 (two)         Essential (primary) hypertensionSystolic pressure much improved from last visit.  No change in BP medications at this time.        Mixed hyperlipidemia    LABORATORY:  Labs ordered to be performed today include HTN/Lipid Panel: CMP, Lipid.            Orders:       [id number]  HTN - [facility] CMP AND LIPID: [id number]  (Send-Out)              Type 2 diabetes mellitus without complicationsDiet controlled            Charge Capture:         Primary Diagnosis:     G30.1  Alzheimer's disease with late onset           Orders:      [id number]  Office/outpatient visit; established patient, level 4  (In-House)               I10  Essential (primary) hypertension     E78.2  Mixed hyperlipidemia     N18.3  Chronic kidney disease, stage 3 (moderate)     E11.9  Type 2 diabetes mellitus without complications

## 2021-05-18 NOTE — PROGRESS NOTES
Blaine Morales  10/07/1933     Office/Outpatient Visit    Visit Date: Mon, Nov 2, 2020 11:26 am    Provider: Ankur Allen MD (Assistant: Walter Dupree, )    Location: [facility]        Electronically signed by Ankur Allen MD on  11/09/2020 06:46:31 AM                             Subjective:        CC: AG is a 87 year old White male.  This is a follow-up visit.  The patient is accompanied into the exam room by his son.  pain in right shoulder, has not gotten any better         HPI:           Patient to be evaluated for type 2 diabetes mellitus without complications.  Compliance with treatment has been good.  He specifically denies associated symptoms, including nocturia, polydipsia and polyuria.  He does not perform home blood glucose monitoring.            Dx with essential (primary) hypertension; compliance with treatment has been good.  He is tolerating the medication well without side effects.  He has not kept a blood pressure diary, but states that pressures have been too high.            Dx with mixed hyperlipidemia; compliance with treatment has been good.  He specifically denies associated symptoms, including muscle pain and weakness.            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  He also has the known peripheral artery disease and is followed by vascular surgery.  He had stent procedures for lower extremity circulation that has been helpful but son says he continues to complain some of pain in the legs.      As far as his Alzheimer's disease he seems quite conversational today.  He does look to his son for affirmation quite a bit.  His son reports that he is having issues with delusion/hallucination at night. Infect son says they almost had to called ambulance the other night because the patient was so agitated.They do have a caregiver  that comes and stays with him through most of the day.      Was recently seen by Dale Castillo with reported right shoulder pain.  Still causing some discomfort reportedly but his son was more concerned about abput it than patient himself.    ROS:     CONSTITUTIONAL:  Negative for chills, fatigue and fever.      CARDIOVASCULAR:  Positive for pedal edema.   Negative for chest pain, orthopnea or paroxysmal nocturnal dyspnea.      RESPIRATORY:  Positive for dyspnea.   Negative for cough.      GASTROINTESTINAL:  Negative for abdominal pain, heartburn, constipation, diarrhea, and stool changes.      GENITOURINARY:  Negative for dysuria and polyuria.      PSYCHIATRIC:  Negative for anxiety and depression.          Past Medical History / Family History / Social History:         Last Reviewed on 2020 12:19 PM by Ankur Allen    Past Medical History:             PAST MEDICAL HISTORY     Hospitalizations:    Pneumonia (at [facility])             ADVANCE DIRECTIVES: Living Will Directive, on file, signed 10-, ./pr         PAST MEDICAL HISTORY             CURRENT MEDICAL PROVIDERS:    Primary care provider ( Dr. OMID Allen )    Cardiologist: CTAIA Spicer    Dentist: ANIYA Hong DDS    Ophthalmologist: Dr. Benites         PREVENTIVE HEALTH MAINTENANCE             COLORECTAL CANCER SCREENING: Up to date (colonoscopy q10y; sigmoidoscopy q5y; Cologuard q3y) was last done 7/22/15, Results are in chart; which was abnormal     DENTAL CLEANING: has full dentures     EYE EXAM: been a long time     PSA: was last done  with normal results         Surgical History:         Coronary Artery Bypass Graft: Oct 2015;     Cardiac Valve Replacement: Oct 2015;     Carotid Endarterectomy: bilateral; ;     Melanoma Surgery from [place] [facility]- Dr Helms;    Subclavian artery stent 10/2015;    SFA-Pop angioplasty with stenting 2020;         Family History:     Father:  at age 73; Cause of death was MI     Mother:  at age  76; Cause of death was stroke     Brother(s): 2 brother(s) total;  Cancer (unspecified type);  Cerebrovascular Accident ( at age 75 )     Sister(s): 5 sister(s) total; 3          Social History: Retired [employer].  [employer]  Ran a [employer] in [place].  Then work [employer],  then [employer]         Tobacco/Alcohol/Supplements:     Last Reviewed on 2020 11:30 AM by Walter Dupree    Tobacco: He has a past history of cigarette smoking; quit date:  .          Alcohol: Frequency:    rare;         Substance Abuse History:     Last Reviewed on 2017 09:08 AM by Ankur Allen        Mental Health History:     Last Reviewed on 2017 09:08 AM by Ankur Allen        Communicable Diseases (eg STDs):     Last Reviewed on 2017 09:08 AM by Ankur Allen        Allergies:     Last Reviewed on 10/28/2020 01:10 PM by Yesi Mccain    Ceclor:          Current Medications:     Last Reviewed on 2020 11:30 AM by Walter Dupree    clopidogreL 75 mg oral tablet [take 1 tablet (75 mg) by oral route once daily]    amLODIPine 5 mg oral tablet [take 1 tablet (5 mg) by oral route once daily]    hydrOXYzine HCL 25 mg oral tablet [take 1 tablet (25 mg) by oral route 2 times per day]    Pravastatin 40 mg oral tablet [take 1 tablet daily at bedtime]    aspirin 81 mg oral tablet, delayed release (enteric coated) [1 tab daily]    metoprolol succinate 50 mg oral Tablet, Extended Release 24 hr [1 tab daily]    Bumetanide 0.5 mg oral tablet [Take 1 tablet(s) by mouth daily]    allopurinoL 100 mg oral tablet [TAKE 1 TABLET DAILY]    Valsartan 160 mg oral tablet [Take 1 tablet(s) by mouth BID]    Glucosamine/Chondroitin 750mg/600mg Tablet [1 tab daily]    donepeziL 10 mg oral tablet [TAKE 1 TABLET(S) BY MOUTH AT BEDTIME]    Namenda 5 mg oral tablet [take 1 tablet (5 mg) by oral route 2 times per day]    Ultram 50 mg oral tablet [take 1 tablet (50 mg) by  oral route every 6  hours as needed]        Objective:        Vitals:         Current: 11/2/2020 11:31:51 AM    Ht:  5 ft, 8.875 in;  Wt: 185.4 lbs;  BMI: 27.5T: 97 F (oral);  BP: 165/78 mm Hg (left arm, sitting);  P: 83 bpm (left arm (BP Cuff), sitting);  sCr: 1.61 mg/dL;  GFR: 31.84        Repeat:     11:32:43 AM  BP:   173/74mm Hg (left arm, sitting) 11:32:55 AM  P:   69bpm (left arm (BP Cuff), sitting)     Exams:     PHYSICAL EXAM:     GENERAL:  well developed and nourished; appropriately groomed; in no apparent distress;     EYES: Nonicteric and with unremarkable lids, iris and pupils;     E/N/T:  normal EACs, TMs, nasal/oral mucosa, teeth, gingiva, and oropharynx;     NECK: carotid exam reveals no bruits;     RESPIRATORY: normal respiratory rate and pattern with no distress; normal breath sounds with no rales, rhonchi, wheezes or rubs;     CARDIOVASCULAR: normal rate; rhythm is regular;  no systolic murmur;     GASTROINTESTINAL: nontender, nondistended; no hepatosplenomegaly or masses; no bruits; nontender; normal bowel sounds; no organomegaly; no masses; no abdominal or renal bruits;     LYMPHATIC: no enlargement of cervical or facial nodes;     MUSCULOSKELETAL: No pedal edema.; Actually thought he had pretty good range of motion in the right shoulder both passive and active.;     NEUROLOGIC: No lateralizing deficit.;     PSYCHIATRIC:  appropriate affect and demeanor; normal speech pattern; grossly normal memory;         Assessment:         E11.9   Type 2 diabetes mellitus without complications       I10   Essential (primary) hypertension       E78.2   Mixed hyperlipidemia       I25.810   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       I70.211   Atherosclerosis of native arteries of extremities with intermittent claudication, right leg       G30.1   Alzheimer's disease with late onset       M25.511   Pain in right shoulder       R44.3   Hallucinations, unspecified            ORDERS:         Meds Prescribed:       [New Rx] SEROquel 25 mg oral tablet [take 1 tablet (25 mg) by oral route QHS], #30 (thirty) tablets, Refills: 2 (two)         Lab Orders:       [id number]  DIAB1 - [facility] LIPID,CMP, A1C: 81536, 61959, 28602  (Send-Out)                      Plan: he did get flu shot already        Type 2 diabetes mellitus without complicationsDiabetes is diet controlled.We will check labs and predicate medication changes based on those results    LABORATORY:  Labs ordered to be performed today include Diabetes Panel 1; CMP, Lipid, A1C.            Orders:       [id number]  DIAB1 - H LIPID,CMP, A1C: 63350, 24849, 20741  (Send-Out)              Essential (primary) hypertensionContinue on his current medication        Mixed hyperlipidemiaWe will check lab predicate medication change based on results        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Continue current medications to address comorbidities and follow with cardiology as directed        Atherosclerosis of native arteries of extremities with intermittent claudication, right legContinue to follow with vascular surgery as recommended.  Continue to address comorbidities        Alzheimer's disease with late onsetSeems to be tolerating his current medication.  Son is becoming more concerned about hallucinations so we discussed options adding antipsychotic medication decided to give that a try.        Pain in right shoulderKeep appointment with orthopedics.         Hallucinations, unspecifiedWe will add on Seroquel see if we can get him on the little more rested at night in addition to keeping the hallucinations/delusions under better control.There is understanding of the risks involved with medication.          Prescriptions:       [New Rx] SEROquel 25 mg oral tablet [take 1 tablet (25 mg) by oral route QHS], #30 (thirty) tablets, Refills: 2 (two)             Other Patient Education Handouts:     Dragon transcription  disclaimer:        Much of this encounter note is an electronic transcription/translation of spoken language to printed text.  The electronic translation of spoken language may permit erroneous, or at times, nonsensical words or phrases to be inadvertently transcribed.  Although I have reviewed the note for such errors, some may still exist.        Charge Capture:         Primary Diagnosis:     E11.9  Type 2 diabetes mellitus without complications           Orders:      35385  Office/outpatient visit; established patient, level 4  (In-House)              I10  Essential (primary) hypertension     E78.2  Mixed hyperlipidemia     I25.810  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     I70.211  Atherosclerosis of native arteries of extremities with intermittent claudication, right leg     G30.1  Alzheimer's disease with late onset     M25.511  Pain in right shoulder     R44.3  Hallucinations, unspecified
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
## 2021-05-18 NOTE — PROGRESS NOTES
Blaine Morales  10/07/1933     Office/Outpatient Visit    Visit Date: Wed, Oct 28, 2020 01:05 pm    Provider: Charo Castillo N.P. (Assistant: Yesi Mccain MA)    Location: [facility]        Electronically signed by Charo Castillo N.P. on  10/28/2020 01:49:26 PM                             Subjective:        CC: MEGHAN is a 87 year old White male.  presents today due to right shoulder pain; pt unsure of his meds wants flu shot         HPI:           PHQ-9 Depression Screening: Completed form scanned and in chart; Total Score 0           Complaint of pain in right shoulder..  The symptom began several weeks ago.  The severity is of severe intensity.  This is the first episode of this type of pain.  The typical duration of an episode is quite variable.  There are no associated symptoms.  right shouder with pain worse with abduction - will 'catch' certain areas and sends extreme pain  now the pain is more steady and constant than in the beginning.  His son is with him today and reports that he sometimes will scream out in pain throughout the day - no injury, no previous injury     ROS:     CONSTITUTIONAL:  Negative for chills, fatigue and fever.      CARDIOVASCULAR:  Negative for chest pain and pedal edema.      RESPIRATORY:  Negative for recent cough and dyspnea.      MUSCULOSKELETAL:  Positive for joint stiffness and (right shoulder) limb pain ( right shoulder ).   Negative for arthralgias or myalgias.      NEUROLOGICAL:  Negative for paresthesias.          Past Medical History / Family History / Social History:         Last Reviewed on 7/17/2020 07:16 PM by Ankur Allen    Past Medical History:             PAST MEDICAL HISTORY     Hospitalizations:    Pneumonia (at [facility])             ADVANCE DIRECTIVES: Living Will Directive, on file, signed 10-, ./pr         PAST MEDICAL HISTORY             CURRENT MEDICAL PROVIDERS:    Primary care provider ( Dr. OMID Allen )     Cardiologist: CATIA Spicer    Dentist: ANIYA Hong DDS    Ophthalmologist: Dr. Benites         PREVENTIVE HEALTH MAINTENANCE             COLORECTAL CANCER SCREENING: Up to date (colonoscopy q10y; sigmoidoscopy q5y; Cologuard q3y) was last done 7/22/15, Results are in chart; which was abnormal     DENTAL CLEANING: has full dentures     EYE EXAM: been a long time     PSA: was last done  with normal results         Surgical History:         Coronary Artery Bypass Graft: Oct 2015;     Cardiac Valve Replacement: Oct 2015;     Carotid Endarterectomy: bilateral; ;     Melanoma Surgery from mid back- Dr Helms;    Subclavian artery stent 10/2015;    SFA-Pop angioplasty with stenting 2020;         Family History:     Father:  at age 73; Cause of death was MI     Mother:  at age 76; Cause of death was stroke     Brother(s): 2 brother(s) total;  Cancer (unspecified type);  Cerebrovascular Accident ( at age 75 )     Sister(s): 5 sister(s) total; 3          Social History: Retired [employer].  [employer]  Ran a [employer] in [place].  Then work [employer],  then [employer]         Tobacco/Alcohol/Supplements:     Last Reviewed on 10/28/2020 01:10 PM by Yesi Mccain    Tobacco: He has a past history of cigarette smoking; quit date:  .          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    Male erectile dysfunction, unspecified    Dizziness and giddiness    Gout, unspecified    Mixed hyperlipidemia    Chronic kidney disease, stage 3 (moderate)    Idiopathic chronic gout, unspecified site, without tophus (tophi)    Fecal impaction    Other symptoms and signs involving cognitive functions and awareness    Alzheimer's disease with late onset    Cardiac arrhythmia, unspecified    History of falling    Syncope and collapse    Causalgia of right lower limb    Rash and other nonspecific skin eruption    Atherosclerosis of native arteries of extremities with intermittent claudication, right leg    Abdominal aortic aneurysm, without rupture    Encounter for other administrative examinations    Encounter for screening for depression    Encounter for immunization    Pain in right shoulder        Immunizations:     Prevnar 13 (Pneumococcal PCV 13) 6/30/2015    Pneomovax 1/25/2011    Fluzone High-Dose pf (>=65 yr) 9/23/2016    Fluzone High-Dose pf (>=65 yr) 11/3/2017    Fluzone High-Dose pf (>=65 yr) 10/24/2018    Fluzone High-Dose pf (>=65 yr) 10/1/2019    Boostrix (Tdap) 5/26/2016        Allergies:     Last Reviewed on 10/28/2020 01:10 PM by Yesi Mccain    Ceclor:          Current Medications:     Last Reviewed on 10/21/2020 10:40 AM by Naomy Brunson    clopidogreL 75 mg oral tablet [take 1 tablet (75 mg) by oral route once daily]    amLODIPine 5 mg oral tablet [take 1 tablet (5 mg) by oral route once daily]    hydrOXYzine HCL 25 mg oral tablet [take 1 tablet (25 mg) by oral route 2 times per day]    Pravastatin 40 mg oral tablet [take 1 tablet daily at bedtime]    aspirin 81 mg oral tablet, delayed release (enteric coated) [1 tab daily]    metoprolol succinate 50 mg oral Tablet, Extended Release 24 hr [1 tab daily]    Bumetanide 0.5 mg oral tablet [Take 1 tablet(s) by mouth daily]    allopurinoL 100 mg oral tablet [TAKE 1 TABLET DAILY]    Valsartan 160  mg oral tablet [Take 1 tablet(s) by mouth BID]    Glucosamine/Chondroitin 750mg/600mg Tablet [1 tab daily]    donepeziL 10 mg oral tablet [TAKE 1 TABLET(S) BY MOUTH AT BEDTIME]    Namenda 5 mg oral tablet [take 1 tablet (5 mg) by oral route 2 times per day]        Objective:        Vitals:         Current: 10/28/2020 1:12:49 PM    Ht:  5 ft, 8.875 in;  Wt: 184.8 lbs;  BMI: 27.4T: 97.3 F (oral);  BP: 151/44 mm Hg (left arm, sitting);  P: 61 bpm (left arm (BP Cuff), sitting);  sCr: 1.61 mg/dL;  GFR: 31.80        Exams:     PHYSICAL EXAM:     GENERAL: Vitals recorded well developed, well nourished;  no apparent distress;     RESPIRATORY: normal appearance and symmetric expansion of chest wall; normal respiratory rate and pattern with no distress; normal breath sounds with no rales, rhonchi, wheezes or rubs;     CARDIOVASCULAR: normal rate; rhythm is regular;  no edema;     MUSCULOSKELETAL: normal gait; normal range of motion of all major muscle groups; pain with range of motion in: right shoulder abduction and external rotation;     NEUROLOGIC: mental status: alert and oriented x 3;     PSYCHIATRIC: appropriate affect and demeanor; normal speech pattern; normal thought and perception;         Assessment:         Z13.31   Encounter for screening for depression       Z23   Encounter for immunization       M25.511   Pain in right shoulder       E11.9   Type 2 diabetes mellitus without complications       Z95.2   Presence of prosthetic heart valve       N18.3   Chronic kidney disease, stage 3 (moderate)           ORDERS:         Meds Prescribed:       [New Rx] Ultram 50 mg oral tablet [take 1 tablet (50 mg) by oral route every 6  hours as needed], #15 (fifteen) tablets, Refills: 0 (zero)         Radiology/Test Orders:       [id number]  Right Radiologic exam, shoulder; comp, 2 views  (Send-Out)              Procedures Ordered:       [id number]  Fluzone High Dose  (In-House)            REFER  Referral to Specialist or Other Facility   (Send-Out)              Other Orders:         Depression screen negative  (In-House)              Administration of influenza virus vaccine  (x1)                  Plan:         Encounter for screening for depression    MIPS Negative Depression Screen           Orders:         Depression screen negative  (In-House)              Encounter for immunization          Immunizations:       [id number]  Fluzone High Dose  (In-House)                Dose (ml): 0.7  Site: left deltoid  Route: intramuscular  Administered by: Yesi Mccain          : [employer]  Lot #: [id number]  Exp: 06/30/2021          NDC: [address]-46        Administration of influenza virus vaccine  (x1)          Pain in right shoulderWe will get the xray Refer to Ortho for further consideration DDx:  impingement syndrome vs rotator cuff involvement vs bursitis         RADIOLOGY:  I have ordered Shoulder x-ray: right shoulder x-ray to be done today.      REFERRALS:  Referral initiated to an orthopedist.            Prescriptions:       [New Rx] Ultram 50 mg oral tablet [take 1 tablet (50 mg) by oral route every 6  hours as needed], #15 (fifteen) tablets, Refills: 0 (zero)           Orders:       [id number]  Right Radiologic exam, shoulder; comp, 2 views  (Send-Out)            REFER  Referral to Specialist or Other Facility  (Send-Out)              Type 2 diabetes mellitus without complicationsavoid steroids due to DM        Presence of prosthetic heart valveavoid NSAIDS        Chronic kidney disease, stage 3 (moderate)avoid NSAIDS            Charge Capture:         Primary Diagnosis:     Z13.31  Encounter for screening for depression           Orders:      [id number]  Office/outpatient visit; established patient, level 3  (In-House)              Depression screen negative  (In-House)              Z23  Encounter for immunization           Orders:      [id number]  Fluzone High Dose  (In-House)              Administration of  influenza virus vaccine  (x1)          M25.511  Pain in right shoulder     E11.9  Type 2 diabetes mellitus without complications     Z95.2  Presence of prosthetic heart valve     N18.3  Chronic kidney disease, stage 3 (moderate)         ADDENDUMS:      ____________________________________    Addendum: 11/02/2020 08:42 Charo Hairston        Remove  [id number]        Add  [id number]

## 2021-05-18 NOTE — PROGRESS NOTES
Blaine Morales  10/07/1933     Office/Outpatient Visit    Visit Date: Fri, Jul 17, 2020 12:38 pm    Provider: Ankur Allen MD (Assistant: Naomy Brunson RN)    Location: [facility]        Electronically signed by Ankur Allen MD on  07/17/2020 07:16:58 PM                             Subjective:        CC: MEGHAN is a 86 year old White male.  follow up on recent stent With his son Pancho.        HPI: uses a [employer] pedant.       ANIYA GUERRERO looks incredibly well for a gentleman who has just undergone hospitalization for an infrarenal aortic dissection and subsequent femoropopliteal angioplasty with stent placement by Dr. Araiza at [facility]. María Elena reviewed those records.  Since then he is noted that the pain he was experiencing in his legs previously to be much improved as well. He has even been out on the riding lawn more working in the yard already.      As far as his Alzheimer's disease he seems to be doing pretty good as well.  They have hired a caregiver that comes in 4 hours/day and that has been a real benefit.caregiver 4 hours a day.           Essential (primary) hypertension details; compliance with treatment has been good.  He is tolerating the medication well without side effects.  He did not bring his blood pressure diary, but says that pressures have been okay.            Concerning mixed hyperlipidemia, compliance with treatment has been good.  He specifically denies associated symptoms, including muscle pain and weakness.      ROS:     CONSTITUTIONAL:  Negative for chills, fatigue and fever.      CARDIOVASCULAR:  Negative for chest pain, orthopnea, paroxysmal nocturnal dyspnea and pedal edema.      RESPIRATORY:  Negative for dyspnea and cough.      GASTROINTESTINAL:  Negative for abdominal pain, heartburn, constipation, diarrhea, and stool changes.      GENITOURINARY:  Negative for dysuria and polyuria.      PSYCHIATRIC:  Negative for anxiety and  depression.          Past Medical History / Family History / Social History:         Last Reviewed on 2020 07:16 PM by Ankur Allen    Past Medical History:             PAST MEDICAL HISTORY     Hospitalizations:    Pneumonia (at [facility])             ADVANCE DIRECTIVES: Living Will Directive, on file, signed 10-, ./pr         PAST MEDICAL HISTORY             CURRENT MEDICAL PROVIDERS:    Primary care provider ( Dr. OMID Allen )    Cardiologist: CATIA Spicer    Dentist: ANIYA Hong DDS    Ophthalmologist: Dr. Benites         PREVENTIVE HEALTH MAINTENANCE             COLORECTAL CANCER SCREENING: Up to date (colonoscopy q10y; sigmoidoscopy q5y; Cologuard q3y) was last done 7/22/15, Results are in chart; which was abnormal     DENTAL CLEANING: has full dentures     EYE EXAM: been a long time     PSA: was last done  with normal results         Surgical History:         Coronary Artery Bypass Graft: Oct 2015;     Cardiac Valve Replacement: Oct 2015;     Carotid Endarterectomy: bilateral; ;     Melanoma Surgery from mid back- Dr Helms;    Subclavian artery stent 10/2015;    SFA-Pop angioplasty with stenting 2020;         Family History:     Father:  at age 73; Cause of death was MI     Mother:  at age 76; Cause of death was stroke     Brother(s): 2 brother(s) total;  Cancer (unspecified type);  Cerebrovascular Accident ( at age 75 )     Sister(s): 5 sister(s) total; 3          Social History: Retired [employer].  [employer]  Ran a [employer] in [place].  Then work [employer],  then [employer]         Tobacco/Alcohol/Supplements:     Last Reviewed on 3/31/2020 11:17 AM by Netta Kingston    Tobacco: He has a past history of cigarette smoking; quit date:  .          Alcohol: Frequency:    rare;         Substance Abuse History:     Last Reviewed on 2017 09:08 AM by Ankur Allen        Mental Health History:     Last Reviewed on  5/31/2017 09:08 AM by Ankur Allen        Communicable Diseases (eg STDs):     Last Reviewed on 5/31/2017 09:08 AM by Ankur Allen        Allergies:     Last Reviewed on 7/17/2020 12:48 PM by Naomy Brunson:          Current Medications:     Last Reviewed on 5/26/2020 03:22 PM by Montse Hester    Pravastatin 40 mg oral tablet [take 1 tablet daily at bedtime]    aspirin 81 mg oral tablet, delayed release (enteric coated) [1 tab daily]    metoprolol succinate 50 mg oral Tablet, Extended Release 24 hr [1 tab daily]    Bumetanide 0.5 mg oral tablet [Take 1 tablet(s) by mouth daily]    allopurinoL 100 mg oral tablet [TAKE 1 TABLET DAILY]    Valsartan 160 mg oral tablet [Take 1 tablet(s) by mouth BID]    Glucosamine/Chondroitin 750mg/600mg Tablet [1 tab daily]    donepeziL 10 mg oral tablet [TAKE 1 TABLET(S) BY MOUTH AT BEDTIME]    Namenda 5 mg oral tablet [take 1 tablet (5 mg) by oral route 2 times per day]    hydrOXYzine HCL 10 mg oral tablet [Take one po Q 4 Hr prn itching]    clopidogreL 75 mg oral tablet [take 1 tablet (75 mg) by oral route once daily]    amLODIPine 5 mg oral tablet [take 1 tablet (5 mg) by oral route once daily]        Objective:        Vitals:         Current: 7/17/2020 12:48:07 PM    Ht:  5 ft, 8.875 in;  Wt: 179.2 lbs;  BMI: 26.6T: 98 F (oral);  BP: 129/42 mm Hg (right arm, sitting);  P: 60 bpm (right arm (BP Cuff), sitting);  sCr: 1.61 mg/dL;  GFR: 31.95        Exams:     PHYSICAL EXAM:     GENERAL:  well developed and nourished; appropriately groomed; in no apparent distress;     EYES: Nonicteric and with unremarkable lids, iris and pupils;     E/N/T:  normal EACs, TMs, nasal/oral mucosa, teeth, gingiva, and oropharynx;     NECK: carotid exam reveals no bruits;     RESPIRATORY: normal respiratory rate and pattern with no distress; normal breath sounds with no rales, rhonchi, wheezes or rubs;     CARDIOVASCULAR: normal rate; rhythm is regular;  no systolic murmur;      GASTROINTESTINAL: nontender, nondistended; no hepatosplenomegaly or masses; no bruits; nontender; normal bowel sounds; no organomegaly; no masses; no abdominal or renal bruits;     LYMPHATIC: no enlargement of cervical or facial nodes;     MUSCULOSKELETAL: normal overall tone No pedal edema.;     NEUROLOGIC: No lateralizing deficit.;     PSYCHIATRIC:  appropriate affect and demeanor; normal speech pattern; grossly normal memory;         Assessment:         I70.211   Atherosclerosis of native arteries of extremities with intermittent claudication, right leg       I71.4   Abdominal aortic aneurysm, without rupture       R21   Rash and other nonspecific skin eruption       G30.1   Alzheimer's disease with late onset       I10   Essential (primary) hypertension       E78.2   Mixed hyperlipidemia       N18.3   Chronic kidney disease, stage 3 (moderate)           Plan:         Atherosclerosis of native arteries of extremities with intermittent claudication, right legIs amazing he is doing quite well.  Continue on current medical regimen.  It will be important for us to manage his comorbidities to keep his risk factors minimize        Abdominal aortic aneurysm, without ruptureAs above        Alzheimer's disease with late onsetGlad to see that they have got a system in place that is working pretty well for them now.        Mixed hyperlipidemiaContinue current medication.  Since she just had recent labs will hold off until his next follow-up        Chronic kidney disease, stage 3 (moderate)Renal function seems stable even given his recent procedures            Other Patient Education Handouts:         Dragon transcription disclaimer:        Much of this encounter note is an electronic transcription/translation of spoken language to printed text.  The electronic translation of spoken language may permit erroneous, or at times, nonsensical words or phrases to be inadvertently transcribed.  Although I have reviewed the note  for such errors, some may still exist.        Charge Capture:         Primary Diagnosis:     I70.211  Atherosclerosis of native arteries of extremities with intermittent claudication, right leg           Orders:      [id number]  Office/outpatient visit; established patient, level 4  (In-House)              I71.4  Abdominal aortic aneurysm, without rupture     R21  Rash and other nonspecific skin eruption     G30.1  Alzheimer's disease with late onset     I10  Essential (primary) hypertension     E78.2  Mixed hyperlipidemia     N18.3  Chronic kidney disease, stage 3 (moderate)

## 2021-05-18 NOTE — PROGRESS NOTES
Blaine Morales  10/07/1933     Office/Outpatient Visit    Visit Date: Tue, May 26, 2020 03:19 pm    Provider: Ankur Allen MD (Assistant: Montse Hester MA)    Location: [facility]        Electronically signed by Ankur Allen MD on  05/26/2020 04:22:21 PM                             Subjective:        CC: MEGHAN is a 86 year old White male.  He presents with muscle pull in right leg.          HPI:       Mr. Morales here with his son Pancho.  They had been to the dermatologist earlier today evaluate the rash on his legs.  Was diagnosed with eczematous dermatitis given a steroid injection and some steroid cream.While they were in town thought they would see if they could be seen for his ongoing complaints of leg pain.  In particular the right calf area is painful especially when ambulating significant distance.  Notices it primarily when he is walking to the mailbox for instance.  Finds that the distance to the onset of pain is growing shorter.  Does have a prior history of smoking.  Has had coronary artery disease in the past.Has been diagnosed with diabetes in the past.  Currently is not taking any diabetic medications managing blood sugars by diet alone.          Concerning essential (primary) hypertension, compliance with treatment has been good.  He is tolerating the medication well without side effects.  He did not bring his blood pressure diary, but says that pressures have been okay.        Does have a history of hyperlipidemia.  This adds to the suspicion for peripheral vascular disease but he is also on a statin which could be contributing to some of his muscle pain    ROS:     CONSTITUTIONAL:  Positive for fatigue.   Negative for chills or fever.      CARDIOVASCULAR:  Negative for chest pain, orthopnea, paroxysmal nocturnal dyspnea and pedal edema.      RESPIRATORY:  Negative for dyspnea and cough.      GASTROINTESTINAL:  Negative for abdominal pain, heartburn, constipation,  diarrhea, and stool changes.      GENITOURINARY:  Negative for dysuria and polyuria.      PSYCHIATRIC:  Negative for anxiety and depression.          Past Medical History / Family History / Social History:         Last Reviewed on 10/01/2019 12:14 PM by Ankur Allen    Past Medical History:             PAST MEDICAL HISTORY     Hospitalizations:    Pneumonia (at [facility])             ADVANCE DIRECTIVES: Living Will Directive, on file, signed 10-, ./pr         PAST MEDICAL HISTORY             CURRENT MEDICAL PROVIDERS:    Primary care provider ( Dr. OMID Allen )    Cardiologist: CATIA Spicer    Dentist: ANIYA Hong DDS    Ophthalmologist: Dr. Benites         PREVENTIVE HEALTH MAINTENANCE             COLORECTAL CANCER SCREENING: Up to date (colonoscopy q10y; sigmoidoscopy q5y; Cologuard q3y) was last done 7/22/15, Results are in chart; which was abnormal     DENTAL CLEANING: has full dentures     EYE EXAM: been a long time     PSA: was last done  with normal results         Surgical History:         Coronary Artery Bypass Graft: Oct 2015;     Cardiac Valve Replacement: Oct 2015;     Carotid Endarterectomy: bilateral; ;     Melanoma Surgery from [facility] back- Dr Helms;    Subclavian artery stent 10/2015;         Family History:     Father:  at age 73; Cause of death was MI     Mother:  at age 76; Cause of death was stroke     Brother(s): 2 brother(s) total;  Cancer (unspecified type);  Cerebrovascular Accident ( at age 75 )     Sister(s): 5 sister(s) total; 3          Social History: Retired [employer].  [employer]  Ran a [employer] in [place].  Then work [employer],  then [employer]         Tobacco/Alcohol/Supplements:     Last Reviewed on 3/31/2020 11:17 AM by Netta Kingston    Tobacco: He has a past history of cigarette smoking; quit date:  .          Alcohol: Frequency:    rare;         Substance Abuse History:     Last Reviewed on 2017 09:08  AM by Ankur Allen        Mental Health History:     Last Reviewed on 5/31/2017 09:08 AM by Ankur Allen        Communicable Diseases (eg STDs):     Last Reviewed on 5/31/2017 09:08 AM by Ankur Allen        Allergies:     Last Reviewed on 3/31/2020 11:17 AM by Netta Kingston    Ceclor:          Current Medications:     Last Reviewed on 3/31/2020 11:18 AM by Netta Kingston    Pravastatin 40 mg oral tablet [take 1 tablet daily at bedtime]    aspirin 81 mg oral tablet, delayed release (enteric coated) [1 tab daily]    metoprolol succinate 50 mg oral Tablet, Extended Release 24 hr [1 tab daily]    Bumetanide 0.5 mg oral tablet [Take 1 tablet(s) by mouth daily]    allopurinoL 100 mg oral tablet [TAKE 1 TABLET DAILY]    Valsartan 160 mg oral tablet [Take 1 tablet(s) by mouth BID]    Glucosamine/Chondroitin 750mg/600mg Tablet [1 tab daily]    donepeziL 10 mg oral tablet [TAKE 1 TABLET(S) BY MOUTH AT BEDTIME]    Namenda 5 mg oral tablet [take 1 tablet (5 mg) by oral route 2 times per day]    hydrOXYzine HCL 10 mg oral tablet [Take one po Q 4 Hr prn itching]        Objective:        Vitals:         Current: 5/26/2020 3:21:25 PM    Ht:  5 ft, 8.875 in;  Wt: 173.8 lbs;  BMI: 25.8T: 97.9 F (oral);  BP: 151/62 mm Hg (left arm, standing);  P: 81 bpm (left arm (BP Cuff), sitting);  sCr: 1.4 mg/dL;  GFR: 36.27        Exams:     PHYSICAL EXAM:     GENERAL:  well developed and nourished; appropriately groomed; in no apparent distress;     EYES: Nonicteric and with unremarkable lids, iris and pupils;     E/N/T:  normal EACs, TMs, nasal/oral mucosa, teeth, gingiva, and oropharynx;     NECK: carotid exam reveals no bruits;     RESPIRATORY: normal respiratory rate and pattern with no distress; normal breath sounds with no rales, rhonchi, wheezes or rubs;     CARDIOVASCULAR: normal rate; rhythm is regular;  no systolic murmur;    Peripheral Pulses: dorsalis pedis: 1+ L and nonpalpable on right;   posterior tibial: 2+ L and nonpalpable on right;     GASTROINTESTINAL: nontender, nondistended; no hepatosplenomegaly or masses; no bruits; moderate LLQ pain;  no guarding;  no rebound tenderness;  normal bowel sounds; no organomegaly; no masses; no abdominal or renal bruits; He is got pretty good pulses in the groins bilaterally.    LYMPHATIC: no enlargement of cervical or facial nodes;     SKIN: He does have patch of dry scaly skin posterior aspect of the left calf.  Excoriated areas on the right calf.; There is no hair on the lower legs bilaterally.    MUSCULOSKELETAL: normal overall tone No pedal edema.;     NEUROLOGIC: No lateralizing deficit. He has absent reflexes at the patella and the Achilles bilaterally.;     PSYCHIATRIC:  appropriate affect and demeanor; normal speech pattern; grossly normal memory;         Assessment:         I70.211   Atherosclerosis of native arteries of extremities with intermittent claudication, right leg       E11.9   Type 2 diabetes mellitus without complications       I10   Essential (primary) hypertension       G57.71   Causalgia of right lower limb       R21   Rash and other nonspecific skin eruption       E78.2   Mixed hyperlipidemia           ORDERS:         Lab Orders:       [id number]  RAPII - [facility] Arthritis Profile  (Send-Out)            91791  CK - [facility]- CK total  (Send-Out)            13234  DIAB - [facility] LIPID,CMP, A1C: 03644, 20167, 19403  (Send-Out)            44114  BDCBC - [facility] CBC with 3 part diff  (Send-Out)              Procedures Ordered:       [id number]  Ankle brachial index  (Send-Out)                      Plan:         Atherosclerosis of native arteries of extremities with intermittent claudication, right legI think his history sounds most consistent with possibility of peripheral vascular disease and claudication.  We will screen with ANITHA and may end up needing to refer to vascular surgery.  If lab work and ANITHA are unremarkable then consider work-up for nerve impingement  and neuro claudication        FOLLOW-UP TESTING #1:    RADIOLOGY:  I have ordered ANITHA testing to be done today.  Please schedule at the [facility] Surg office           Orders:       [id number]  Ankle brachial index  (Send-Out)              Type 2 diabetes mellitus without complicationsWe will go ahead and grab some blood from him while he is here today follow-up on his diabetes as well as look into possible sources of his leg pain    LABORATORY:  Labs ordered to be performed today include Diabetes Panel 1; CMP, Lipid, A1C.            Orders:       [id number]  DIAB - [facility] LIPID,CMP, A1C: 82460, 02189, 58752  (Send-Out)              Essential (primary) hypertensionContinue on the current medication for now        Causalgia of right lower limbTold him I will hold off on any particular medications at this time I rather not shoot in the dark 1 to get a little like she had on the diagnosis before we initiate treatment    LABORATORY:  Labs ordered to be performed today include Arthritis Profile and CK, total.            Orders:       [id number]  RAPII - [facility] Arthritis Profile  (Send-Out)            10864  CK - [facility]- CK total  (Send-Out)              Rash and other nonspecific skin eruptionContinue to follow with dermatology recommendations and follow-up with them as directed    LABORATORY:  Labs ordered to be performed today include CBC.            Orders:       [id number]  CB - [facility] CBC with 3 part diff  (Send-Out)                  Other Patient Education Handouts:     Dragon transcription disclaimer:        Much of this encounter note is an electronic transcription/translation of spoken language to printed text.  The electronic translation of spoken language may permit erroneous, or at times, nonsensical words or phrases to be inadvertently transcribed.  Although I have reviewed the note for such errors, some may still exist.        Patient Recommendations:        For  Causalgia of right lower limb:    I also recommend ^.              Charge  Capture:         Primary Diagnosis:     I70.211  Atherosclerosis of native arteries of extremities with intermittent claudication, right leg           Orders:      43742  Office/outpatient visit; established patient, level 4  (In-House)              E11.9  Type 2 diabetes mellitus without complications     I10  Essential (primary) hypertension     G57.71  Causalgia of right lower limb     R21  Rash and other nonspecific skin eruption     E78.2  Mixed hyperlipidemia

## 2021-05-18 NOTE — PROGRESS NOTES
Blaine Morales 10/07/1933     Office/Outpatient Visit    Visit Date: Tue, Oct 1, 2019 11:29 am    Provider: Ankur Allen MD (Assistant: Milena Kitchen LPN)    Location: [facility]        Electronically signed by Ankur Allen MD on  10/05/2019 03:16:07 PM                             SUBJECTIVE:        CC:     MEGHAN is a 85 year old White male.  The patient is accompanied into the exam room by his son and Devon Morales.  Medicare Wellness, and flu vaccine.          HPI:         MEGHAN is here for a Medicare wellness visit.  The required HRA questions are integrated within this visit note. Family medical history and individual medical/surgical history were reviewed and updated.  A current height, weight, BMI, blood pressure, and pulse were recorded in the vitals section of the note and have been reviewed. Patient's medications, including supplements, were recorded in the chart and reviewed.  Current providers and suppliers were reviewed and updated.          Self-Assessment of Health: He rates his health as very good. He rates his confidence of being able to control/manage most of his health problems as somewhat confident. His physical/emotional health has limited his social activites not at all.  A review of possible cognitive impairment was performed and the following was noted: he is not having trouble driving;  memory changes are noted;  he is not having trouble with his finances A review of functional ability, including bathing, dressing, walking, and urine/bowel continence as well as level of safety was performed and was found to be negative.  Falls Risk: Has not had any falls or only one fall without injury in the past year.  He denies having trouble hearing the TV/radio when others do not, having to strain to hear or understand conversations and wearing hearing aid(s).  Concerning home safety, he reports that at home he DOES have adequate lighting, a skid resistant shower/tub, grab bars in the  bath, handrails on stairs and functioning smoke alarms, but not absence of throw rugs.          Immunization Status: Wants flu vaccine today; Physical Activity: pt still works outside and gardens.; Type of diet patient normally eats is described as well-balanced with fruits and vegetables Tobacco: He has a past history of cigarette smoking; quit date:  2000.  Preventative Health updated today.          PHQ-9 Depression Screening: Completed form scanned and in chart; Total Score 1         Dx with mixed hyperlipidemia; compliance with treatment has been good.  He specifically denies associated symptoms, including muscle pain and weakness.      As far as his dementia he seems to be holding his own.  Some days are better than others.  As his son points out he seems to hang on to the significant thanks.  For instance a granddaughter was over recently some candy and he was able to recall that the following day whenever she came back. Still able to take care of his activities of daily living.     Does have a previous history of heart valve replacement doing okay on his current medical regimen.  He is not aware of any kind of palpitations.  Also has a history of chronic kidney disease is avoiding NSAIDs     ROS:     CONSTITUTIONAL:  Negative for chills, fatigue and fever.      CARDIOVASCULAR:  Negative for chest pain, orthopnea, paroxysmal nocturnal dyspnea and pedal edema.      RESPIRATORY:  Negative for dyspnea and cough.      GASTROINTESTINAL:  Negative for abdominal pain, heartburn, constipation, diarrhea, and stool changes.      GENITOURINARY:  Negative for dysuria and polyuria.      PSYCHIATRIC:  Negative for anxiety and depression.          PMH/FMH/SH:     Last Reviewed on 10/01/2019 12:14 PM by Ankur Allen    Past Medical History:             PAST MEDICAL HISTORY     Hospitalizations:    Pneumonia (at [facility])             ADVANCE DIRECTIVES: Living Will Directive, on file, signed 10-, ./pr          PAST MEDICAL HISTORY             CURRENT MEDICAL PROVIDERS:    Primary care provider ( Dr. OMID Allen )    Cardiologist: CATIA Spicer    Dentist: ANIYA Hong DDS    Ophthalmologist: Dr. Benites         PREVENTIVE HEALTH MAINTENANCE             COLORECTAL CANCER SCREENING: Up to date (colonoscopy q10y; sigmoidoscopy q5y; Cologuard q3y) was last done 7/22/15, Results are in chart; which was abnormal     DENTAL CLEANING: has full dentures     EYE EXAM: been a long time     PSA: was last done  with normal results         Surgical History:         Coronary Artery Bypass Graft: Oct 2015;     Cardiac Valve Replacement: Oct 2015;     Carotid Endarterectomy: bilateral; ;     Melan[facility] Surgery from [facility]- Dr Helms;    Subclavian artery stent 10/2015;         Family History:     Father:  at age 73; Cause of death was MI     Mother:  at age 76; Cause of death was stroke     Brother(s): 2 brother(s) total;  Cancer (unspecified type);  Cerebrovascular Accident ( at age 75 )     Sister(s): 5 sister(s) total; 3          Social History: Retired [employer].  [employer]  Ran a [employer] in [place].  Then work [employer],  then [employer]         Tobacco/Alcohol/Supplements:     Last Reviewed on 10/01/2019 11:32 AM by Spurling, Sarah C    Tobacco: He has a past history of cigarette smoking; quit date:  .          Alcohol: Frequency:    rare;         Substance Abuse History:     Last Reviewed on 2017 09:08 AM by Ankur Allen        Mental Health History:     Last Reviewed on 2017 09:08 AM by Ankur Allen        Communicable Diseases (eg STDs):     Last Reviewed on 2017 09:08 AM by Ankur Allen            Allergies:     Last Reviewed on 10/01/2019 11:32 AM by Spurling, Sarah C    Ceclor:        Current Medications:     Last Reviewed on 10/01/2019 11:34 AM by Spurling, Sarah C    Allopurinol 100mg Tablet 1 tab daily     Aricept 10mg Tablet Take 1  tablet(s) by mouth at bedtime     Pravastatin 40mg Tablet take 1 tablet daily at bedtime     Amlodipine  5mg Tablet 1/2 tab daily     Valsartan 160mg Tablet Take 1 tablet(s) by mouth BID     Bumetanide 1mg Tablet Take 1/2 tablet(s) by mouth daily     Aspirin (ASA) 81mg Tablets, Enteric Coated 1 tab daily     Metoprolol 50mg Tablets, Extended Release 1 tab daily     Glucosamine/Chondroitin 750mg/600mg Tablet 1 tab daily     Triamcinolone Acetonide 0.1% Cream APPLY AS DIRECTED         OBJECTIVE:        Vitals:         Current: 10/1/2019 11:44:08 AM    Ht:  5 ft, 8.875 in;  Wt: 163.4 lbs;  BMI: 24.2    T: 98.1 F (oral);  BP: 152/50 mm Hg (left arm, sitting);  P: 53 bpm (left arm (BP Cuff), sitting);  sCr: 1.39 mg/dL;  GFR: 36.21    VA: 20/70 OD, 20/50 OS (near, with correction)        Repeat:     11:44:43 AM     BP:   120/50mm Hg (right arm, sitting, heart rate: 52)         Exams:     PHYSICAL EXAM:     GENERAL:  well developed and nourished; appropriately groomed; in no apparent distress;     EYES: Nonicteric and with unremarkable lids, iris and pupils;     E/N/T:  normal EACs, TMs, nasal/oral mucosa, teeth, gingiva, and oropharynx;     NECK: carotid exam reveals no bruits;     RESPIRATORY: normal respiratory rate and pattern with no distress; normal breath sounds with no rales, rhonchi, wheezes or rubs;     CARDIOVASCULAR: normal rate; rhythm is regular;  a systolic murmur is noted: it is grade 2/6 and heard best at the upper right sternal border;     LYMPHATIC: no enlargement of cervical or facial nodes;     MUSCULOSKELETAL: normal overall tone No pedal edema.;     NEUROLOGIC: No lateralizing deficit.;     PSYCHIATRIC: He is very functional conversant.  In conversation one would never know that he had memory issues;         Procedures:     Influenza vaccination     1. Influenza high dose 0.5 ml unit dose, [facility], ABN signed given IM in the right upper arm; administered by [facility];  lot number [id number]; expires 5/16/20              ASSESSMENT           V70.0   Z00.00  Health checkup              DDx:     V79.0   Z13.89  Screening for depression              DDx:     V04.81   Z23  Influenza vaccination              DDx:     272.2   E78.2  Mixed hyperlipidemia              DDx:     331.0   G30.1  Alzheimer's dementia              DDx:     585.3   N18.3  Chronic kidney disease, Stage III (moderate)              DDx:     V43.3   Z95.2  Artificial heart valve replacement              DDx:         ORDERS:         Meds Prescribed:       Refill of: Aricept (Donepezil HCl) 10mg Tablet Take 1 tablet(s) by mouth at bedtime  #90 (Ninety) tablet(s) Refills: 0         Lab Orders:       [id number]  HTN - [facility] CMP AND LIPID: [id number], [id number]  (Send-Out)           Procedures Ordered:       54097  Fluzone High Dose  (In-House)           Other Orders:       1101F  Pt screen for fall risk; document no falls in past year or only 1 fall w/o injury in past year (VEE)  (In-House)           Depression screen negative  (In-House)           Administration of influenza virus vaccine (x1)         Subsequent Annual Well Visit Medicare (x1)                 PLAN:          Health checkup We reviewed the preventive service recommendations and created an individualized handout     MIPS Has had no falls in the past year           Orders:       1101F  Pt screen for fall risk; document no falls in past year or only 1 fall w/o injury in past year (VEE)  (In-House)            Screening for depression     MIPS PHQ-9 Depression Screening: Completed form scanned and in chart; Total Score 1; Negative Depression Screen           Orders:         Depression screen negative  (In-House)            Influenza vaccination           Orders:       04552  Fluzone High Dose  (In-House)                     Administration of influenza virus vaccine (x1)          Mixed hyperlipidemia     LABORATORY:  Labs ordered to be performed today include HTN/Lipid Panel: CMP, Lipid.             Orders:       69500  HTN - [facility] CMP AND LIPID: [id number], [id number]  (Send-Out)            Alzheimer's dementia           Prescriptions:       Refill of: Aricept (Donepezil HCl) 10mg Tablet Take 1 tablet(s) by mouth at bedtime  #90 (Ninety) tablet(s) Refills: 0          Chronic kidney disease, Stage III (moderate) Check labs see how kidney function looks now          Artificial heart valve replacement Seems to be doing fine with his heart continue to follow with cardiology as they direct             Patient Recommendations:    Dragon transcription disclaimer:        Much of this encounter note is an electronic transcription/translation of spoken language to printed text.  The electronic translation of spoken language may permit erroneous, or at times, nonsensical words or phrases to be inadvertently transcribed.  Although I have reviewed the note for such errors, some may still exist.             CHARGE CAPTURE           **Please note: ICD descriptions below are intended for billing purposes only and may not represent clinical diagnoses**        Primary Diagnosis:         V70.0 Health checkup            Z00.00    Encounter for general adult medical examination without abnormal findings              Orders:          27801   Preventive medicine, established patient, age 65+ years  (In-House)                                           Subsequent Annual Well Visit Medicare (x1)           1101F   Pt screen for fall risk; document no falls in past year or only 1 fall w/o injury in past year (VEE)  (In-House)           V79.0 Screening for depression            Z13.89    Encounter for screening for other disorder              Orders:          03997 -25  Office/outpatient visit; established patient, level 3  (In-House)                Depression screen negative  (In-House)           V04.81 Influenza vaccination            Z23    Encounter for immunization              Orders:          96229   Fluzone High Dose   (In-House)                                           Administration of influenza virus vaccine (x1)         272.2 Mixed hyperlipidemia            E78.2    Mixed hyperlipidemia    331.0 Alzheimer's dementia            G30.1    Alzheimer's disease with late onset    585.3 Chronic kidney disease, Stage III (moderate)            N18.3    Chronic kidney disease, stage 3 (moderate)    V43.3 Artificial heart valve replacement            Z95.2    Presence of prosthetic heart valve

## 2021-05-18 NOTE — PROGRESS NOTES
Blaine Morales  10/07/1933     Office/Outpatient Visit    Visit Date: Tue, Mar 31, 2020 11:17 am    Provider: Ankur Allen MD (Assistant: Netta Kingston LPN)    Location: [facility]        Electronically signed by Ankur Allen MD on  03/31/2020 12:44:00 PM                             Subjective:        CC: MEGHAN is a 86 year old White male.  He presents with a rash.          HPI: TELEHEALTH          Patient to be evaluated for essential (primary) hypertension.  Compliance with treatment has been good.  He is tolerating the medication well without side effects.  MEGHAN does not check his blood pressure other than at his clinic appointments.            In regard to the mixed hyperlipidemia, compliance with treatment has been good.  He reports muscle pain.  He specifically denies weakness.        He is develops issues with red irritated skin lesions scattered on his extremities and upper back.  Looks suspicious for a neurodermatitis type problem.  Wife does not have similar affliction.  He admits that he does kind  of pick and scratch.      His Alzheimer's has remained rather stable according to his son.  He says that there are periods where he seems more forgetful.  Did not give specifics but does state that this past weekend there were issues.      Other thing they are concerned about is the right calf he has been having some discomfort when he is walking.  He says he can walk to the mailbox and then on the way back he started having pain.  Seems to be rather reproducible.  He does have a prior history of smoking.  Also has a history of bypass surgery in the past.Does have hypertension and hyperlipidemia.  Would not be surprising if he has peripheral vascular disease.    ROS:     CONSTITUTIONAL:  Negative for chills, fatigue and fever.      CARDIOVASCULAR:  Negative for chest pain, orthopnea, paroxysmal nocturnal dyspnea and pedal edema.      RESPIRATORY:  Negative for dyspnea and  cough.      GASTROINTESTINAL:  Negative for abdominal pain, heartburn, constipation, diarrhea, and stool changes.      GENITOURINARY:  Negative for dysuria and polyuria.      PSYCHIATRIC:  Negative for anxiety and depression.          Past Medical History / Family History / Social History:         Last Reviewed on 10/01/2019 12:14 PM by Ankur Allen    Past Medical History:             PAST MEDICAL HISTORY     Hospitalizations:    Pneumonia (at [facility])             ADVANCE DIRECTIVES: Living Will Directive, on file, signed 10-, ./pr         PAST MEDICAL HISTORY             CURRENT MEDICAL PROVIDERS:    Primary care provider ( Dr. OMID Allen )    Cardiologist: CATIA Spicer    Dentist: ANIYA Hong DDS    Ophthalmologist: Dr. Benites         PREVENTIVE HEALTH MAINTENANCE             COLORECTAL CANCER SCREENING: Up to date (colonoscopy q10y; sigmoidoscopy q5y; Cologuard q3y) was last done 7/22/15, Results are in chart; which was abnormal     DENTAL CLEANING: has full dentures     EYE EXAM: been a long time     PSA: was last done  with normal results         Surgical History:         Coronary Artery Bypass Graft: Oct 2015;     Cardiac Valve Replacement: Oct 2015;     Carotid Endarterectomy: bilateral; ;     Melanoma Surgery from [facility] back- Dr Helms;    Subclavian artery stent 10/2015;         Family History:     Father:  at age 73; Cause of death was MI     Mother:  at age 76; Cause of death was stroke     Brother(s): 2 brother(s) total;  Cancer (unspecified type);  Cerebrovascular Accident ( at age 75 )     Sister(s): 5 sister(s) total; 3          Social History: Retired [employer].  [employer]  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          Alcohol:  Frequency:    rare;         Substance Abuse History:     Last Reviewed on 5/31/2017 09:08 AM by Ankur Allen        Mental Health History:     Last Reviewed on 5/31/2017 09:08 AM by Ankur Allen        Communicable Diseases (eg STDs):     Last Reviewed on 5/31/2017 09:08 AM by Ankur Allen        Allergies:     Last Reviewed on 10/29/2019 01:10 PM by Mary Lou Quintanilla:          Current Medications:     Last Reviewed on 10/29/2019 03:30 PM by Naomy Brunson    Pravastatin 40 mg oral tablet [take 1 tablet daily at bedtime]    aspirin 81 mg oral tablet, delayed release (enteric coated) [1 tab daily]    metoprolol succinate 50 mg oral Tablet, Extended Release 24 hr [1 tab daily]    Bumetanide 0.5 mg oral tablet [Take 1 tablet(s) by mouth daily]    Allopurinol 100 mg oral tablet [1 tab daily ]    Valsartan 160 mg oral tablet [Take 1 tablet(s) by mouth BID]    Glucosamine/Chondroitin 750mg/600mg Tablet [1 tab daily]    donepeziL 10 mg oral tablet [TAKE 1 TABLET(S) BY MOUTH AT BEDTIME]    Namenda 5 mg oral tablet [take 1 tablet (5 mg) by oral route 2 times per day]        Objective:        Exams:     PHYSICAL EXAM:     GENERAL: well developed, well nourished;  well groomed;  no apparent distress;     EYES: anicteric;     LYMPHATIC: no enlargement of cervical or facial nodes; no supraclavicular nodes;     NEUROLOGIC: mental status: alert and oriented x 3; Pleasant and cooperative.  Answers questions fairly appropriately.  Still exhibits some uncertainty to the about recent events.    PSYCHIATRIC: appropriate affect and demeanor; normal psychomotor function; normal speech pattern;         Assessment:         I10   Essential (primary) hypertension       E78.2   Mixed hyperlipidemia       R21   Rash and other nonspecific skin eruption       G30.1   Alzheimer's disease with late onset       G57.71   Causalgia of right lower limb           ORDERS:         Meds Prescribed:       [New Rx] hydrOXYzine  HCL 10 mg oral tablet [Take one po Q 4 Hr prn itching], #40 (forty) tablets, Refills: 0 (zero)                 Plan: We discussed issues related to darrell virus/Corvid-19 disease including the importance of social distancing, good hygiene, behaviors for visitors/family, safe grocery shopping practices, etc.        Essential (primary) hypertension    Telehealth: Verbal consent obtained for visit to occur via phone call; Staff, other than provider, present during telephone visit include none; Total time spent was 19 minutes         Mixed hyperlipidemiaReviewed his last lab work with him reminded him of the importance of being compliant with the medication.        Rash and other nonspecific skin eruptionI count a suspect he has a neurodermatitis from the appearance of the excoriated areas visible on face time.          Prescriptions:       [New Rx] hydrOXYzine HCL 10 mg oral tablet [Take one po Q 4 Hr prn itching], #40 (forty) tablets, Refills: 0 (zero)         Alzheimer's disease with late onsetContinue on his current medication and supportive care from family.        Causalgia of right lower limbI wonder if he might have some claudication.  At present suggest he does try to walk through the pain.            Other Patient Education Handouts:     Dragon transcription disclaimer:        Much of this encounter note is an electronic transcription/translation of spoken language to printed text.  The electronic translation of spoken language may permit erroneous, or at times, nonsensical words or phrases to be inadvertently transcribed.  Although I have reviewed the note for such errors, some may still exist.        Charge Capture:         Primary Diagnosis:     I10  Essential (primary) hypertension           Orders:      01246  Office/outpatient visit; established patient, level 3  (In-House)              E78.2  Mixed hyperlipidemia     R21  Rash and other nonspecific skin eruption     G30.1  Alzheimer's disease with late  onset     G57.71  Causalgia of right lower limb         ADDENDUMS:      ____________________________________    Addendum: 04/04/2020 08:31 PM - Ankur Allen        The visit was actually via [employer].

## 2021-05-18 NOTE — PROGRESS NOTES
Blaine Morales 10/07/1933     Office/Outpatient Visit    Visit Date: Mon, Jul 9, 2018 08:45 am    Provider: Ankur Allen MD (Assistant: Ariana Irby RN)    Location: [facility]        Electronically signed by Ankur Allen MD on  07/10/2018 03:50:40 PM                             SUBJECTIVE:        CC:     MEGHAN is a 84 year old White male.  This is a follow-up visit.  Medication Refills (PT IS NOT TAKING POTASSIUM)         HPI:         Concerning hypertension, MEGHAN does not check his blood pressure other than at his clinic appointments.  He is tolerating the medication well without side effects.  Compliance with treatment has been good.          Mixed hyperlipidemia details; compliance with treatment has been good.  He specifically denies associated symptoms, including muscle pain and weakness.          MEGHAN is here for a Medicare wellness visit.  Individual and family medical history was reviewed and updated A list of current providers and suppliers reviewed and updated A current height, weight, BMI, blood pressure, and pulse were recorded in the vitals section of the note and have been reviewed A review of functional ability and level of safety was performed and was negative He denies having trouble hearing the TV/radio when others do not, having to strain to hear or understand conversations and wearing hearing aid(s).  Concerning home safety, he reports that at home he DOES have throw rugs, grab bars in the bath, handrails on stairs and functioning smoke alarms, but not poor lighting or a slippery bath or shower.      Immunization Status: ** Has not received hepatitis B immunization;     Physical Activity: ** Never exercises; Has not had any falls or only one fall without injury in the past year.  Advance Care Directive updated today in [facility] Tobacco: He has a past history of cigarette smoking; quit date:  2000.    Preventative Health updated today.          He denies dissatisfaction with his  life, getting bored often, feeling helpless, preferring to stay at home rather than going out and doing new things and feeling worthless the way he is now.  Total score is 0 But he has sure been through a lot.  His son and daughter-in-law both  last year and his wife has cancer and recent fall resulted in facial fractures for her.     He is doing fine on treatment for gout.  No flare up and tolerating medication well.     He is followed by Cardiology for his AS and had appt in March and reviewed that note with him.  He has follow up scheduled in Sept.     ROS:     CONSTITUTIONAL:  Negative for chills, fatigue, fever and weight change.      CARDIOVASCULAR:  Negative for chest pain, orthopnea, paroxysmal nocturnal dyspnea and pedal edema.      RESPIRATORY:  Negative for dyspnea and cough.      GASTROINTESTINAL:  Negative for abdominal pain, heartburn, constipation, diarrhea, and stool changes.      GENITOURINARY:  Negative for dysuria and polyuria.      PSYCHIATRIC:  Negative for anxiety and depression.          PMH/FMH/SH:     Last Reviewed on 2018 09:34 AM by Ankur Allen    Past Medical History:             PAST MEDICAL HISTORY     Hospitalizations:    Pneumonia (at [facility])             ADVANCE DIRECTIVES: Living Will Directive, on file, signed 10-, ./pr         PAST MEDICAL HISTORY             CURRENT MEDICAL PROVIDERS:    Primary care provider ( Dr. OMID Allen )    Cardiologist: CATIA Spicer    Dentist: ANIYA Hong DDS    Ophthalmologist: Dr. Benites         PREVENTIVE HEALTH MAINTENANCE             COLORECTAL CANCER SCREENING:; 2015         Surgical History:         Coronary Artery Bypass Graft: Oct 2015;     Cardiac Valve Replacement: Oct 2015;     Carotid Endarterectomy: bilateral;     Melanoma Surgery from [place]- Dr Helms;    Subclavian artery stent;         Family History:     Father:  at age 73; Cause of death was MI     Mother:  at age 76; Cause of death was stroke      Brother(s): 2 brother(s) total;  Cancer (unspecified type);  Cerebrovascular Accident ( at age 75 )     Sister(s): 5 sister(s) total; 3          Social History: Retired [employer].  [employer]  Ran a [employer] in [place].  Then work [employer],  then [employer]         Tobacco/Alcohol/Supplements:     Last Reviewed on 2018 08:52 AM by Ariana Irby    Tobacco: He has a past history of cigarette smoking; quit date:  .          Alcohol: Frequency:    rare;         Substance Abuse History:     Last Reviewed on 2017 09:08 AM by Ankur Allen        Mental Health History:     Last Reviewed on 2017 09:08 AM by Ankur Allen        Communicable Diseases (eg STDs):     Last Reviewed on 2017 09:08 AM by Ankur Allen            Allergies:     Last Reviewed on 2018 08:52 AM by Ariana Irby    Ceclor:        Current Medications:     Last Reviewed on 2018 08:57 AM by Ariana Irby    Allopurinol 100mg Tablet 1 tab daily     Pravastatin 40mg Tablet take 1 tablet daily at bedtime     Lisinopril 40mg Tablet Take 1 tablet(s) by mouth daily     Senokot 8.6mg Tablet Take 2 tablets po BID, prn for constipation     Valsartan 160mg Tablet Take 1 tablet(s) by mouth daily     Bumetanide 1mg Tablet Take 1/2 tablet(s) by mouth daily     Potassium Chloride 10mEq Tablets, Extended Release 1 po qd     Aspirin (ASA) 81mg Tablets, Enteric Coated 1 tab daily     Metoprolol 50mg Tablet 1 tab daily     Amlodipine  5mg Tablet 1 tab daily         OBJECTIVE:        Vitals:         Current: 2018 8:47:52 AM    Ht:  5 ft, 8.875 in;  Wt: 170 lbs;  BMI: 25.2    T: 97.2 F (oral);  BP: 122/62 mm Hg (left arm, sitting);  P: 75 bpm (left arm (BP Cuff), sitting);  sCr: 1.37 mg/dL;  GFR: 38.02        Exams:     PHYSICAL EXAM:     GENERAL:  well developed and nourished; appropriately groomed; in no apparent distress;     EYES: Nonicteric and with unremarkable lids,  iris and pupils;     E/N/T:  normal EACs, TMs, nasal/oral mucosa, teeth, gingiva, and oropharynx;     NECK: thyroid exam reveals no discrete nodules;  carotid exam reveals Transmitted Murmur;     RESPIRATORY: normal respiratory rate and pattern with no distress; normal breath sounds with no rales, rhonchi, wheezes or rubs;     CARDIOVASCULAR: normal rate; rhythm is regular;  a systolic murmur is noted: it is grade 3/6 and heard best at the right sternal border;     GASTROINTESTINAL: nontender, nondistended; no hepatosplenomegaly or masses; no bruits;     LYMPHATIC: no enlargement of cervical or facial nodes;     SKIN:  no significant rashes or lesions; no suspicious moles;     MUSCULOSKELETAL: normal overall tone No pedal edema.;     NEUROLOGIC: No lateralizing deficit.;     PSYCHIATRIC:  appropriate affect and demeanor; normal speech pattern; grossly normal memory;         ASSESSMENT           401.1   I10  Hypertension              DDx:     272.2   E78.2  Mixed hyperlipidemia              DDx:     V70.0   Z00.00  Health checkup              DDx:     V79.0   Z13.89  Screening for depression              DDx:     274.02   M1A.00X0  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 CMP AND LIPID: [id number], [id number]  (Send-Out)         04180  BDCB2 - [facility] CBC w/o diff  (Send-Out)         73603  TSH - [facility] TSH  (Send-Out)           Other Orders:         Depression screen negative  (In-House)           Subsequent Annual Well Visit Medicare (x1)                 PLAN:          Hypertension           Patient Education Handouts:       [facility] Medication Compliance           Mixed hyperlipidemia     LABORATORY:  Labs ordered to be performed today include HTN/Lipid Panel: CMP, Lipid and TSH.            Orders:       [id number]  HTNLP - 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            Orders:       [id number]  BDCB2 - [facility] CBC w/o diff  (Send-Out)            Aortic stenosis continue cardiology follow up             CHARGE CAPTURE           **Please note: ICD descriptions below are intended for billing purposes only and may not represent clinical diagnoses**        Primary Diagnosis:         401.1 Hypertension            I10    Essential (primary) hypertension              Orders:          [id number] -25  Office/outpatient visit; established patient, level 3  (In-House)           272.2 Mixed hyperlipidemia            E78.2    Mixed hyperlipidemia    V70.0 Health checkup            Z00.00    Encounter for general adult medical examination without abnormal findings              Orders:          [id number]   Preventive medicine, established patient, age 65+ years  (In-House)                                           Subsequent Annual Well Visit Medicare (x1)         V79.0 Screening for depression            Z13.89    Encounter for screening for other disorder              Orders:             Depression screen negative  (In-House)           274.02 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

## 2021-05-18 NOTE — PROGRESS NOTES
"Blaine Morales 10/07/1933     Office/Outpatient Visit    Visit Date: Tue, Oct 29, 2019 01:10 pm    Provider: Ankur Allen MD (Assistant: Mary Lou Quintanilla MA)    Location: [facility]        Electronically signed by Ankur Allen MD on  10/29/2019 10:54:55 PM                             SUBJECTIVE:        CC:     MEGHAN is a 86 year old White male.  This is a follow-up visit.  The patient is accompanied into the exam room by his daughter and Karyn.  check up         HPI:     Mr. Morales is here with his daughter Karyn.  He was recently in the emergency room having a area and some kind of fall/syncopal event.  On the vent and drove him to the emergency room he fell in the kitchen and struck the barstool with his left chest wall sustained some rib pain.  Work-up was negative for fracture.  He had another event at some point where he was standing next to the bed and got lightheaded and fell backward.  There was another event where he was coming outside and he was \"staggering around\" according to his daughter and she made him go back in sit down and after resting he felt better.     It is also notable that he has a diagnosis of Alzheimer's disease.  His daughter had called here yesterday reporting that he was agitated, but apparently after further investigation this may have been brought on by fact that he was receiving threats over the telephone from a evidanza artist.  This was of course disconcerting.  Karyn did contact the police and .. After she convinced him to change his telephone number and everything settled down from the evidanza point of view his behaviors improved as well.     Despite his diagnosis of Alzheimer's he has not been getting much supervision in regards to his medication other than his wife apparently sets out some medicines for him.  His daughter seems to think that maybe he is been noncompliant.  He however says that he is got a Medi-planner and takes his medicine but perhaps " not exactly at the hour ordained.     ROS:     CONSTITUTIONAL:  Negative for chills, fatigue and fever.      CARDIOVASCULAR:  Negative for chest pain, orthopnea, paroxysmal nocturnal dyspnea and pedal edema.      RESPIRATORY:  Positive for cough.   Negative for dyspnea.      GASTROINTESTINAL:  Negative for abdominal pain, heartburn, constipation, diarrhea, and stool changes.      GENITOURINARY:  Negative for dysuria and polyuria.      PSYCHIATRIC:  Negative for anxiety and depression.          PMH/FMH/SH:     Last Reviewed on 10/01/2019 12:14 PM by Ankur Allen    Past Medical History:             PAST MEDICAL HISTORY     Hospitalizations:    Pneumonia (at [facility])             ADVANCE DIRECTIVES: Living Will Directive, on file, signed 10-, ./pr         PAST MEDICAL HISTORY             CURRENT MEDICAL PROVIDERS:    Primary care provider ( Dr. OMDI Allen )    Cardiologist: CATIA Spicer    Dentist: ANIYA Hong DDS    Ophthalmologist: Dr. Benites         PREVENTIVE HEALTH MAINTENANCE             COLORECTAL CANCER SCREENING: Up to date (colonoscopy q10y; sigmoidoscopy q5y; Cologuard q3y) was last done 7/22/15, Results are in chart; which was abnormal     DENTAL CLEANING: has full dentures     EYE EXAM: been a long time     PSA: was last done  with normal results         Surgical History:         Coronary Artery Bypass Graft: Oct 2015;     Cardiac Valve Replacement: Oct 2015;     Carotid Endarterectomy: bilateral; ;     Melanoma Surgery from mid back- Dr Helms;    Subclavian artery stent 10/2015;         Family History:     Father:  at age 73; Cause of death was MI     Mother:  at age 76; Cause of death was stroke     Brother(s): 2 brother(s) total;  Cancer (unspecified type);  Cerebrovascular Accident ( at age 75 )     Sister(s): 5 sister(s) total; 3          Social History: Retired [employer].  [employer]  Ran a [employer] in [place].  Then work [employer],  then  Post[employer] Service         Tobacco/Alcohol/Supplements:     Last Reviewed on 10/01/2019 11:32 AM by Spurling, Sarah C    Tobacco: He has a past history of cigarette smoking; quit date:  2000.          Alcohol: Frequency:    rare;         Substance Abuse History:     Last Reviewed on 5/31/2017 09:08 AM by Ankur Allen        Mental Health History:     Last Reviewed on 5/31/2017 09:08 AM by Ankur Allen        Communicable Diseases (eg STDs):     Last Reviewed on 5/31/2017 09:08 AM by Ankur Allen            Allergies:     Last Reviewed on 10/01/2019 11:32 AM by Spurling, Sarah C    Ceclor:        Current Medications:     Last Reviewed on 10/29/2019 03:30 PM by Naomy Brunson    Aricept 10mg Tablet Take 1 tablet(s) by mouth at bedtime     Allopurinol 100mg Tablet 1 tab daily     Pravastatin 40mg Tablet take 1 tablet daily at bedtime     Amlodipine  5mg Tablet 1/2 tab daily     Valsartan 160mg Tablet Take 1 tablet(s) by mouth BID     Bumetanide 0.5mg Tablet Take 1 tablet(s) by mouth daily     Aspirin (ASA) 81mg Tablets, Enteric Coated 1 tab daily     Metoprolol 50mg Tablets, Extended Release 1 tab daily     Glucosamine/Chondroitin 750mg/600mg Tablet 1 tab daily     Triamcinolone Acetonide 0.1% Cream APPLY AS DIRECTED         OBJECTIVE:        Vitals:         Current: 10/29/2019 1:19:19 PM    Ht:  5 ft, 8.875 in;  Wt: 169.8 lbs;  BMI: 25.2    T: 98.1 F (oral);  BP: 174/65 mm Hg (left arm, sitting);  P: 61 bpm (left arm (BP Cuff), sitting);  sCr: 1.64 mg/dL;  GFR: 30.66        Repeat:     1:21:36 PM     BP:   193/63mm Hg (left arm, sitting, P-60)     1:42:56 PM     BP:   200/70mm Hg (right arm, sitting, by huyen)     2:15:25 PM     BP:   172/58mm Hg (left arm, lying, P-56)     2:19:13 PM     BP:   173/55mm Hg (left arm, sitting, P-56)     2:22:25 PM     BP:   160/78mm Hg (left arm, standing, P-59)         Exams:     PHYSICAL EXAM:     GENERAL:  well developed and nourished; appropriately groomed; in  no apparent distress;     EYES: Nonicteric and with unremarkable lids, iris and pupils;     E/N/T:  normal EACs, TMs, nasal/oral mucosa, teeth, gingiva, and oropharynx;     NECK: carotid exam reveals no bruits;     RESPIRATORY: normal respiratory rate and pattern with no distress; normal breath sounds with no rales, rhonchi, wheezes or rubs;     CARDIOVASCULAR: normal rate; rhythm is regular but with a trigeminal pattern of extra systoles;  a systolic murmur is noted: it is grade 2/6 and heard best at the upper right sternal border;     LYMPHATIC: no enlargement of cervical or facial nodes;     MUSCULOSKELETAL: normal overall tone No pedal edema.;     NEUROLOGIC: No lateralizing deficit.;     PSYCHIATRIC: He is very functional conversant.  Even though he may have some memory issues as he is still able to recall many events once they are brought up;         Lab/Test Results:         LABORATORY RESULTS: EKG performed by tls     Rhythm strip preformed by tls;         ASSESSMENT           780.2   R55  Near-syncope              DDx:     V15.88   Z91.81  History of fall              DDx:     401.1   I10  Hypertension              DDx:     331.0   G30.1  Alzheimer's dementia              DDx:     272.2   E78.2  Mixed hyperlipidemia              DDx:     427.9   I49.9  Cardiac dysrhythmia              DDx:         ORDERS:         Meds Prescribed:       Refill of: Amlodipine  (Amlodipine )  5mg Tablet 1 tab po daily  #1 (One) tablet(s) Refills: 0         Radiology/Test Orders:       [id number]  Electrocardiogram, routine with at least 12 leads; with interpretation and report  (In-House)         [id number]  Rhythm strip w/interp and report  (Send-Out)           Lab Orders:       ORTHO  Orthostatic blood pressure  (In-House)                   PLAN:          Near-syncope I am not sure if he had some orthostasis perhaps that led to the near syncope and fall.          History of fall Did discuss fall precautions.           Orders:        ORTHO  Orthostatic blood pressure  (In-House)            Hypertension I am not make the assumption that he is taking the medication as it was directed.  If this is not true then adding additional medication may actually cause him to be hypotensive and add to his problem of syncope. So for now I am going to increase his amlodipine to 5 mg daily. Pointed out to the daughter and the patient the importance of being compliant with medication.  Since he does have Alzheimer's disease I stressed the importance that somebody be in charge of managing his medications.  If he is using a Medi-planner then somewhat it should review it is being filled correctly and periodically reviewed that medicines have been taken as expected. We will bring him back in 1 month to see how he is doing on the medications.           Prescriptions:       Refill of: Amlodipine  (Amlodipine )  5mg Tablet 1 tab po daily  #1 (One) tablet(s) Refills: 0          Alzheimer's dementia Certainly is understandable that he could have gotten distraught and agitated with the telephone scammer.  Karyn seems somewhat hesitant to assert the role of supervision.  If he has more agitation then we may also need to consider adding on something like Seroquel.          Cardiac dysrhythmia Since he had some cardiac dysrhythmia on auscultation did check EKG which does show some ectopy but nothing that appears to be a malignant rhythm.         TESTS/PROCEDURES:  Will proceed with an ECG to be performed/scheduled now.            Orders:       [id number]  Electrocardiogram, routine with at least 12 leads; with interpretation and report  (In-House)         [id number]  Rhythm strip w/interp and report  (Send-Out)               CHARGE CAPTURE           **Please note: ICD descriptions below are intended for billing purposes only and may not represent clinical diagnoses**        Primary Diagnosis:         780.2 Near-syncope            R55    Syncope and collapse              Orders:           [id number]   Office/outpatient visit; established patient, level 4  (In-House)           V15.88 History of fall            Z91.81    History of falling              Orders:          ORTHO   Orthostatic blood pressure  (In-House)           401.1 Hypertension            I10    Essential (primary) hypertension    331.0 Alzheimer's dementia            G30.1    Alzheimer's disease with late onset    272.2 Mixed hyperlipidemia            E78.2    Mixed hyperlipidemia    427.9 Cardiac dysrhythmia            I49.9    Cardiac arrhythmia, unspecified              Orders:          [id number]   Electrocardiogram, routine with at least 12 leads; with interpretation and report  (In-House)

## 2021-05-24 ENCOUNTER — OFFICE VISIT CONVERTED (OUTPATIENT)
Dept: FAMILY MEDICINE CLINIC | Age: 86
End: 2021-05-24
Attending: FAMILY MEDICINE

## 2021-06-05 NOTE — PROGRESS NOTES
Blaine Morales  10/07/1933     Office/Outpatient Visit    Visit Date: Mon, May 24, 2021 11:47 am    Provider: Ankur Allen MD (Assistant: Mary Lou Quintanilla MA)    Location: [facility]        Electronically signed by Ankur Allen MD on  2021 02:13:22 PM                             Subjective:        CC: MEGHAN is a 87 year old White male.  This is a follow-up visit.  The patient is accompanied into the exam room by his daughter.  mcw,check up, neck pain daughter states all medication should still be the same and she thinks he has finished doxycycline         HPI: Accompanied by daughter.  His wife  recently after 67 years of marriage and 72 years together. So he has a pretty big void. Even though his short term memory is terrible, he has been able to hold on to the fact that she has .  He was with her up until the time of her passing.  He did have some delusion/hallucination associated with the seeing an airplane that he flew in the  while they where at the [place].In regards to the Alzheimers he is tolerating the mediation well.  They are having the meds pre-packed now to assist with compliance.  He does have 24 hr caregivers.  They have begun to have some conversations around options for support/living arrangements going forward.           MEGHAN is here for a Medicare wellness visit.          Self-Assessment of Health: He rates his health as good. He rates his confidence of being able to control/manage most of his health problems as very confident. His physical/emotional health has limited his social activites slightly.  A review of possible cognitive impairment was performed and the following was noted: he is not having trouble driving;  memory changes are noted;  he is not having trouble with his finances A review of functional ability, including bathing, dressing, walking, and urine/bowel continence as well as level of safety was performed and was found to be  "negative.  Falls Risk: Has not had any falls or only one fall without injury in the past year.  He denies having trouble hearing the TV/radio when others do not, having to strain to hear or understand conversations and wearing hearing aid(s).  Concerning home safety, he reports that at home he DOES have adequate lighting, a skid resistant shower/tub, grab bars in the bath, handrails on stairs, functioning smoke alarms and absence of throw rugs.  Physical Activity: He exercises but less than 20 minutes 3 days per week; Type of diet patient normally eats is described as well-balanced with fruits and vegetables Tobacco: He has a past history of cigarette smoking; quit date:  .  Preventative Health updated today.        They have been concerned about his DM due to all the food that has been brought to the house following the .  We did review the recent lab results which showed Hba1c 7.0, so reassured them that BS have been okay, and indulging for a week or two is likely to be tolerated pretty well.       His biggest complaint is regarding swelling in legs.  Had trouble putting his shoes on to come over here today.  His BP has been borderline elevated even on his medication.  He has CKD but has not seen Nephrologist for few years.   He has not had other heart failure symptoms.  No orthopnea, PND, or worsening LI.          PHQ-9 Depression Screening: Completed form scanned and in chart; Total Score 4       His daughter reports that he has been showing signs of grief, including tearfulness.  He reports   some trouble going to sleep.  His  appetite is okayThey are wondering if he could get \"something for his nerves.\"      Has also been reporting some pain in his neck and down to the right shoulder.  But he says today it hasn't bothered him.     ROS:     CONSTITUTIONAL:  Negative for chills, fatigue and fever.      CARDIOVASCULAR:  Positive for pedal edema.   Negative for chest pain, orthopnea or paroxysmal " nocturnal dyspnea.      RESPIRATORY:  Positive for dyspnea.   Negative for cough.      GASTROINTESTINAL:  Negative for abdominal pain, heartburn, constipation, diarrhea, and stool changes.      GENITOURINARY:  Negative for dysuria and polyuria.      PSYCHIATRIC:  Negative for anxiety and depression.          Past Medical History / Family History / Social History:         Last Reviewed on 2021 12:31 PM by Ankur Allen    Past Medical History:             PAST MEDICAL HISTORY     Hospitalizations:    Pneumonia and (at [facility])    Myocardial Infarction admitted once on          PAST MEDICAL HISTORY             CURRENT MEDICAL PROVIDERS:    Primary care provider ( Dr. OMID Allen )    Cardiologist: CATIA Spicer    Dentist: ANIYA Hong DDS    Ophthalmologist: Dr. Benites         PREVENTIVE HEALTH MAINTENANCE             COLORECTAL CANCER SCREENING: Up to date (colonoscopy q10y; sigmoidoscopy q5y; Cologuard q3y) was last done 7/22/15, Results are in chart; which was abnormal     DENTAL CLEANING: has full dentures     EYE EXAM: been a long time     PSA: was last done  with normal results         Surgical History:         Coronary Artery Bypass Graft: Oct 2015;     Cardiac Valve Replacement: Oct 2015;     Carotid Endarterectomy: bilateral; ;     Melanoma Surgery from mid back- Dr Helms;    Subclavian artery stent 10/2015;    SFA-Pop angioplasty with stenting 2020;         Family History:     Father:  at age 73; Cause of death was MI     Mother:  at age 76; Cause of death was stroke     Brother(s): 2 brother(s) total;  Cancer (unspecified type);  Cerebrovascular Accident ( at age 75 )     Sister(s): 5 sister(s) total; 3          Social History: Retired [employer].  [employer]  Ran a [employer] in [place].  Then work [employer],  then [employer]         Tobacco/Alcohol/Supplements:     Last Reviewed on 2021 11:59 AM by Mary Lou Quintanilla    Tobacco: He has  a past history of cigarette smoking; quit date:  2000.          Alcohol: Frequency:    rare;         Substance Abuse History:     Last Reviewed on 5/31/2017 09:08 AM by Ankur Allen        Mental Health History:     Last Reviewed on 5/31/2017 09:08 AM by Ankur Allen        Communicable Diseases (eg STDs):     Last Reviewed on 5/31/2017 09:08 AM by Ankur Allen        Allergies:     Last Reviewed on 5/24/2021 11:59 AM by Mary Lou Quintanilla    Ceclor:          Current Medications:     Last Reviewed on 5/24/2021 11:59 AM by Mary Lou Quintanilla    amLODIPine 5 mg oral tablet [take 1 tablet (5 mg) by oral route once daily]    atorvastatin 80 mg oral tablet [take 1 tablet (80 mg) by oral route once daily]    metoprolol succinate 50 mg oral Tablet, Extended Release 24 hr [1 tab daily]    aspirin 81 mg oral tablet, delayed release (enteric coated) [1 tab daily]    Bumetanide 0.5 mg oral tablet [Take 1 tablet(s) by mouth daily]    allopurinoL 100 mg oral tablet [TAKE 1 TABLET DAILY]    Valsartan 160 mg oral tablet [Take 1 tablet(s) by mouth BID]    donepeziL 10 mg oral tablet [TAKE 1 TABLET(S) BY MOUTH AT BEDTIME]    Namenda 5 mg oral tablet [take 1 tablet (5 mg) by oral route 2 times per day]    QUEtiapine 25 mg oral tablet [TAKE ONE half  TABLET BY MOUTH AT BEDTIME]    traMADol 50 mg oral tablet [take 1 tablet (50 mg) by oral route QHS]    clopidogreL 75 mg oral tablet [take 1 tablet (75 mg) by oral route once daily]    gabapentin 300 mg oral capsule [take 1 capsule (300 mg) by oral route 2 times per day]    multivitamin oral tablet [take 1 tablet daily]    doxycycline monohydrate 100 mg oral capsule [take 1 capsule (100 mg) by oral route 2 times per day]        Objective:        Vitals:         Current: 5/24/2021 11:52:29 AM    Ht:  5 ft, 8.875 in;  Wt: 197.4 lbs;  BMI: 29.3T: 98 F (temporal);  BP: 158/55 mm Hg (left arm, sitting);  P: 64 bpm (left arm (BP Cuff), sitting);  sCr: 1.71 mg/dL;  GFR: 30.79         Exams:     PHYSICAL EXAM:     GENERAL:  well developed and nourished; appropriately groomed; in no apparent distress;     EYES: Nonicteric and with unremarkable lids, iris and pupils;     E/N/T:  normal EACs, TMs, nasal/oral mucosa, teeth, gingiva, and oropharynx;     NECK: range of motion is normal; carotid exam reveals no bruits;     RESPIRATORY: normal respiratory rate and pattern with no distress; normal breath sounds with no rales, rhonchi, wheezes or rubs;     CARDIOVASCULAR: normal rate; rhythm is regular but with occasional extra systoles;  a systolic murmur is noted: it is grade 2/6;     LYMPHATIC: no enlargement of cervical or facial nodes;     MUSCULOSKELETAL: Dough like edema bilat lower leg up to knee.;     NEUROLOGIC: No lateralizing deficit.;     PSYCHIATRIC: flat affect, pleasant and conversant. Memory is not good.  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       N18.32   Chronic kidney disease, stage 3b       G30.1   Alzheimer's disease with late onset       I70.211   Atherosclerosis of native arteries of extremities with intermittent claudication, right leg       R60.0   Localized edema       F43.23   Adjustment disorder with mixed anxiety and depressed mood       M54.2   Cervicalgia           ORDERS:         Meds Prescribed:       [Refilled] valsartan 320 mg oral tablet [take 1 tablet (320 mg) by oral route once daily], #90 (ninety) tablets, Refills: 0 (zero)       [New Rx] Lexapro 5 mg oral tablet [take 1 tablet (5 mg) by oral route once daily], #90 (ninety)  tablets, Refills: 0 (zero)         Other Orders:         Depression screen positive and follow up plan documented  (In-House)              Subsequent Annual Well Visit Medicare  (x1)                  Plan: Time spent 50 min including pre-visit chart review, pt and daughter interview, discussion of management options, future planning, and post visit charting.        Encounter for general adult medical examination without abnormal findingsReviewed preventive service recommendations and created handout. Recommend he get the covid vaccine    ADVANCE DIRECTIVES (Please update in PMH): Living will on file         Type 2 diabetes mellitus without complicationscont current Rx and return to traditional diet once bereavement meals are completed.         Essential (primary) hypertensionWill increase Valsartan and stop the Amlodipine to see if help with swelling and keep BP under control          Prescriptions:       [Refilled] valsartan 320 mg oral tablet [take 1 tablet (320 mg) by oral route once daily], #90 (ninety) tablets, Refills: 0 (zero)         Mixed hyperlipidemiareviewed lab, continue Rx        Encounter for screening for depression    MIPS Positive Depression Screen: Pharmacologic intervention initiated/modified           Orders:         Depression screen positive and follow up plan documented  (In-House)              Chronic kidney disease, stage 3bAvoid NSAIDs for neck pain, but Tylenol is okay. If swelling not improved with med changes we might need to get him back in with Nephrologist to help navigate the tight wire of CHF vs. CKD treatment        Atherosclerosis of native arteries of extremities with intermittent claudication, right legcont Rx.  Reviewed Vasc Surg note.        Localized edemaTED hose and if not improve with change in BP meds and use of TEDs we can bump up the Bumex if renal function allows        Adjustment disorder with mixed anxiety and depressed mood          Prescriptions:        [New Rx] Lexapro 5 mg oral tablet [take 1 tablet (5 mg) by oral route once daily], #90 (ninety) tablets, Refills: 0 (zero)         CervicalgiaAvoid NSAIDs for neck pain, but Tylenol is okay. If not improved we can get xray and consider PT evaluation.             Charge Capture:         Primary Diagnosis:     Z00.00  Encounter for general adult medical examination without abnormal findings           Orders:      [id number]  Preventive medicine, established patient, age 65+ years  (In-House)              Subsequent Annual Well Visit Medicare  (x1)          E11.9  Type 2 diabetes mellitus without complications           Orders:      [id number]  Office/outpatient visit; established patient, level 5  (In-House)              I10  Essential (primary) hypertension     E78.2  Mixed hyperlipidemia     Z13.31  Encounter for screening for depression           Orders:        Depression screen positive and follow up plan documented  (In-House)              I25.810  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     N18.32  Chronic kidney disease, stage 3b     G30.1  Alzheimer's disease with late onset     I70.211  Atherosclerosis of native arteries of extremities with intermittent claudication, right leg     R60.0  Localized edema     F43.23  Adjustment disorder with mixed anxiety and depressed mood     M54.2  Cervicalgia

## 2021-07-01 VITALS
TEMPERATURE: 97.7 F | WEIGHT: 166.4 LBS | HEIGHT: 69 IN | DIASTOLIC BLOOD PRESSURE: 57 MMHG | HEART RATE: 68 BPM | BODY MASS INDEX: 24.65 KG/M2 | SYSTOLIC BLOOD PRESSURE: 164 MMHG

## 2021-07-01 VITALS
DIASTOLIC BLOOD PRESSURE: 78 MMHG | WEIGHT: 169.8 LBS | SYSTOLIC BLOOD PRESSURE: 160 MMHG | BODY MASS INDEX: 25.15 KG/M2 | HEIGHT: 69 IN | TEMPERATURE: 98.1 F | HEART RATE: 61 BPM

## 2021-07-01 VITALS
WEIGHT: 163.4 LBS | DIASTOLIC BLOOD PRESSURE: 50 MMHG | TEMPERATURE: 98.1 F | HEART RATE: 53 BPM | SYSTOLIC BLOOD PRESSURE: 120 MMHG | BODY MASS INDEX: 24.2 KG/M2 | HEIGHT: 69 IN

## 2021-07-01 VITALS
HEART RATE: 53 BPM | SYSTOLIC BLOOD PRESSURE: 130 MMHG | HEIGHT: 69 IN | TEMPERATURE: 97.7 F | BODY MASS INDEX: 25.36 KG/M2 | WEIGHT: 171.2 LBS | DIASTOLIC BLOOD PRESSURE: 46 MMHG

## 2021-07-01 VITALS
HEART RATE: 55 BPM | BODY MASS INDEX: 25.03 KG/M2 | WEIGHT: 169 LBS | SYSTOLIC BLOOD PRESSURE: 141 MMHG | TEMPERATURE: 98.2 F | HEIGHT: 69 IN | DIASTOLIC BLOOD PRESSURE: 51 MMHG

## 2021-07-01 VITALS
DIASTOLIC BLOOD PRESSURE: 62 MMHG | WEIGHT: 170 LBS | BODY MASS INDEX: 25.18 KG/M2 | SYSTOLIC BLOOD PRESSURE: 122 MMHG | HEIGHT: 69 IN | TEMPERATURE: 97.2 F | HEART RATE: 75 BPM

## 2021-07-02 VITALS
TEMPERATURE: 97.3 F | HEIGHT: 69 IN | SYSTOLIC BLOOD PRESSURE: 151 MMHG | HEART RATE: 61 BPM | BODY MASS INDEX: 27.37 KG/M2 | DIASTOLIC BLOOD PRESSURE: 44 MMHG | WEIGHT: 184.8 LBS

## 2021-07-02 VITALS
BODY MASS INDEX: 27.46 KG/M2 | DIASTOLIC BLOOD PRESSURE: 74 MMHG | SYSTOLIC BLOOD PRESSURE: 173 MMHG | HEART RATE: 69 BPM | WEIGHT: 185.4 LBS | TEMPERATURE: 97 F | HEIGHT: 69 IN

## 2021-07-02 VITALS
TEMPERATURE: 97.2 F | BODY MASS INDEX: 29.06 KG/M2 | DIASTOLIC BLOOD PRESSURE: 48 MMHG | OXYGEN SATURATION: 94 % | HEART RATE: 67 BPM | SYSTOLIC BLOOD PRESSURE: 124 MMHG | WEIGHT: 196.2 LBS | HEIGHT: 69 IN

## 2021-07-02 VITALS
BODY MASS INDEX: 26.54 KG/M2 | TEMPERATURE: 98 F | SYSTOLIC BLOOD PRESSURE: 129 MMHG | HEIGHT: 69 IN | HEART RATE: 60 BPM | WEIGHT: 179.2 LBS | DIASTOLIC BLOOD PRESSURE: 42 MMHG

## 2021-07-02 VITALS
DIASTOLIC BLOOD PRESSURE: 55 MMHG | BODY MASS INDEX: 29.24 KG/M2 | TEMPERATURE: 98 F | HEART RATE: 64 BPM | SYSTOLIC BLOOD PRESSURE: 158 MMHG | HEIGHT: 69 IN | WEIGHT: 197.4 LBS

## 2021-07-02 VITALS
TEMPERATURE: 97.9 F | HEART RATE: 81 BPM | BODY MASS INDEX: 25.74 KG/M2 | HEIGHT: 69 IN | DIASTOLIC BLOOD PRESSURE: 62 MMHG | SYSTOLIC BLOOD PRESSURE: 151 MMHG | WEIGHT: 173.8 LBS

## 2021-07-28 DIAGNOSIS — M25.511 ACUTE PAIN OF RIGHT SHOULDER: ICD-10-CM

## 2021-07-28 RX ORDER — TRAMADOL HYDROCHLORIDE 50 MG/1
TABLET ORAL
Qty: 40 TABLET | Refills: 0 | Status: SHIPPED | OUTPATIENT
Start: 2021-07-28 | End: 2021-08-31 | Stop reason: SDUPTHER

## 2021-07-28 RX ORDER — DONEPEZIL HYDROCHLORIDE 10 MG/1
10 TABLET, FILM COATED ORAL DAILY
Qty: 90 TABLET | Refills: 0 | Status: SHIPPED | OUTPATIENT
Start: 2021-07-28 | End: 2021-01-01 | Stop reason: SDUPTHER

## 2021-07-28 RX ORDER — DONEPEZIL HYDROCHLORIDE 10 MG/1
10 TABLET, FILM COATED ORAL DAILY
COMMUNITY
End: 2021-07-28 | Stop reason: SDUPTHER

## 2021-07-28 RX ORDER — ESCITALOPRAM OXALATE 5 MG/1
5 TABLET ORAL DAILY
Qty: 90 TABLET | Refills: 0 | Status: SHIPPED | OUTPATIENT
Start: 2021-07-28 | End: 2021-01-01 | Stop reason: SDUPTHER

## 2021-07-28 RX ORDER — VALSARTAN 320 MG/1
320 TABLET ORAL DAILY
COMMUNITY
End: 2021-08-03 | Stop reason: SDUPTHER

## 2021-07-28 RX ORDER — MEMANTINE HYDROCHLORIDE 5 MG/1
5 TABLET ORAL 2 TIMES DAILY
COMMUNITY
End: 2021-08-30 | Stop reason: SDUPTHER

## 2021-07-28 RX ORDER — MULTIPLE VITAMINS W/ MINERALS TAB 9MG-400MCG
1 TAB ORAL DAILY
COMMUNITY
End: 2021-07-28 | Stop reason: SDUPTHER

## 2021-07-28 RX ORDER — MULTIPLE VITAMINS W/ MINERALS TAB 9MG-400MCG
1 TAB ORAL DAILY
Qty: 100 TABLET | Refills: 0 | Status: SHIPPED | OUTPATIENT
Start: 2021-07-28 | End: 2021-08-05 | Stop reason: SDUPTHER

## 2021-07-28 RX ORDER — ESCITALOPRAM OXALATE 5 MG/1
5 TABLET ORAL DAILY
COMMUNITY
End: 2021-07-28 | Stop reason: SDUPTHER

## 2021-07-28 RX ORDER — ASPIRIN 81 MG/1
1 TABLET ORAL DAILY
COMMUNITY
End: 2021-09-24 | Stop reason: SDUPTHER

## 2021-07-29 NOTE — TELEPHONE ENCOUNTER
I sent Rx, but e needs to schedule appt for follow up since it will take months to get on the schedule anyway

## 2021-08-03 ENCOUNTER — PATIENT OUTREACH (OUTPATIENT)
Dept: CASE MANAGEMENT | Facility: OTHER | Age: 86
End: 2021-08-03

## 2021-08-03 DIAGNOSIS — M25.511 ACUTE PAIN OF RIGHT SHOULDER: ICD-10-CM

## 2021-08-03 NOTE — TELEPHONE ENCOUNTER
Caller: [facility] [phone] [address] FX    Relationship: Pharmacy    Best call back number: [phone]    Medication needed:   Requested Prescriptions     Pending Prescriptions Disp Refills   • traMADol (ULTRAM) 50 MG tablet 40 tablet 0     Sig: Take 1-2 tablets by mouth 6-8 hours prn moderate-severe pain   • valsartan (DIOVAN) 320 MG tablet       Sig: Take 1 tablet by mouth Daily.       When do you need the refill by: ASAP    What additional details did the patient provide when requesting the medication: CALLER INFORMED THAT SHE PUT IN A REFILL FOR THESE A WEEK AGO AND HAS HEARD NOTHING. CALLER STATED THAT PATIENT ONLY HAS 2 DAYS OF MEDICATION LEFT. PLEASE ADVISE, THANK YOU    Does the patient have less than a 3 day supply:  [x] Yes  [] No    What is the patient's preferred pharmacy: [facility] [place] [phone] [address] [address]

## 2021-08-03 NOTE — OUTREACH NOTE
Patient Outreach    Karyn and I had a long discussion regarding her dad and his care.  He is still dealing with a lot of sadness regarding the loss of his wife.  He is having some memory issues with vascular dementia but has round the clock care at his home.  He is not socially isolated and has friends and family around quite a bit. He is not complaining at this time regarding his PVD and pain in his legs, but is being followed by Dr. Gonzales.  He has had both of his covid vaccines.  Reviewed sdoh.  Updated care gaps.

## 2021-08-04 RX ORDER — VALSARTAN 320 MG/1
320 TABLET ORAL DAILY
Qty: 90 TABLET | Refills: 0 | Status: SHIPPED | OUTPATIENT
Start: 2021-08-04 | End: 2021-01-01 | Stop reason: SDUPTHER

## 2021-08-04 RX ORDER — TRAMADOL HYDROCHLORIDE 50 MG/1
TABLET ORAL
Qty: 40 TABLET | Refills: 0 | OUTPATIENT
Start: 2021-08-04

## 2021-08-05 RX ORDER — MULTIPLE VITAMINS W/ MINERALS TAB 9MG-400MCG
1 TAB ORAL DAILY
Qty: 100 TABLET | Refills: 0 | Status: SHIPPED | OUTPATIENT
Start: 2021-08-05 | End: 2022-01-01 | Stop reason: SDUPTHER

## 2021-08-05 NOTE — TELEPHONE ENCOUNTER
"    Caller: [facility] [address]  - [phone] [facility] [phone] FX     Relationship: Pharmacy    Best call back number: [phone]    Medication needed:   traMADol (ULTRAM) 50 MG tablet    PHARMACY CALLED STATING THAT THEY RECEIVED THE PATIENTS OTHER MEDICATIONS BUT THEY HAVE STILL NOT RECEIVED THE TRAMADOL.     HUB WAS ABLE TO SEE DR. VIGIL STATING THAT IT WAS REFILLED ON 07/28 AND THAT THE DUPLICATE REQUEST WAS DENIED BUT HUB WAS UNABLE TO RELAY TO PHARMACY AS IT DOES NOT STATE \"HUB TO READ\". WARM TRANSFER TO THE OFFICE FAILED.     PLEASE ADVISE..    PATIENT ONLY HAS 2 DAYS LEFT OF MEDICATION   What is the patient's preferred pharmacy: [facility] [place], [place] - [address] [place] [address] [phone] [facility] [phone] FX     "

## 2021-08-06 NOTE — TELEPHONE ENCOUNTER
Agustina and I were playing phone tag, spoke with her, pt is a packaged med pt. pts tramadol was filled, however sent to [facility] instead of [place] location. Agustina is just going to package the meds she can, and have [facility] fill the Tramadol as pt is completely out and needs this asap- clr

## 2021-08-09 ENCOUNTER — TELEPHONE (OUTPATIENT)
Dept: FAMILY MEDICINE CLINIC | Age: 86
End: 2021-08-09

## 2021-08-09 NOTE — TELEPHONE ENCOUNTER
Caller: Karyn Kitchen    Relationship: Emergency Contact    Best call back number: [phone]    Who are you requesting to speak with (clinical staff, provider,  specific staff member): SHIVA    What was the call regarding: STATES THAT [facility] HAS CONGESTION AND WANTS TO SEE IF PATIENT CAN BE SEEN TOMORROW. HUB UNABLE TO WARM TRANSFER. PLEASE ADVISE     Do you require a callback: YES

## 2021-08-10 ENCOUNTER — OFFICE VISIT (OUTPATIENT)
Dept: FAMILY MEDICINE CLINIC | Age: 86
End: 2021-08-10

## 2021-08-10 ENCOUNTER — HOSPITAL ENCOUNTER (OUTPATIENT)
Dept: GENERAL RADIOLOGY | Facility: HOSPITAL | Age: 86
Discharge: HOME | End: 2021-08-10

## 2021-08-10 ENCOUNTER — TELEPHONE (OUTPATIENT)
Dept: CASE MANAGEMENT | Facility: OTHER | Age: 86
End: 2021-08-10

## 2021-08-10 ENCOUNTER — LAB (OUTPATIENT)
Dept: LAB | Facility: HOSPITAL | Age: 86
End: 2021-08-10

## 2021-08-10 VITALS
SYSTOLIC BLOOD PRESSURE: 145 MMHG | TEMPERATURE: 97 F | BODY MASS INDEX: 29.3 KG/M2 | HEART RATE: 68 BPM | WEIGHT: 197.8 LBS | DIASTOLIC BLOOD PRESSURE: 65 MMHG | HEIGHT: 69 IN

## 2021-08-10 DIAGNOSIS — R41.0 DISORIENTATION: Primary | ICD-10-CM

## 2021-08-10 DIAGNOSIS — R14.0 ABDOMINAL DISTENSION: ICD-10-CM

## 2021-08-10 DIAGNOSIS — I25.2 MYOCARDIAL INFARCT, OLD: ICD-10-CM

## 2021-08-10 DIAGNOSIS — J06.9 VIRAL UPPER RESPIRATORY TRACT INFECTION: ICD-10-CM

## 2021-08-10 DIAGNOSIS — R06.02 SHORTNESS OF BREATH: ICD-10-CM

## 2021-08-10 LAB
ALBUMIN SERPL-MCNC: 3.9 G/DL (ref 3.5–5.2)
ALBUMIN/GLOB SERPL: 1.1 G/DL
ALP SERPL-CCNC: 84 U/L (ref 39–117)
ALT SERPL W P-5'-P-CCNC: 20 U/L (ref 1–41)
ANION GAP SERPL CALCULATED.3IONS-SCNC: 11.2 MMOL/L (ref 5–15)
AST SERPL-CCNC: 33 U/L (ref 1–40)
BASOPHILS # BLD AUTO: 0.08 10*3/MM3 (ref 0–0.2)
BASOPHILS NFR BLD AUTO: 0.9 % (ref 0–1.5)
BILIRUB SERPL-MCNC: 0.3 MG/DL (ref 0–1.2)
BILIRUB UR QL STRIP: NEGATIVE
BUN SERPL-MCNC: 46 MG/DL (ref 8–23)
BUN/CREAT SERPL: 23 (ref 7–25)
CALCIUM SPEC-SCNC: 9.2 MG/DL (ref 8.6–10.5)
CHLORIDE SERPL-SCNC: 109 MMOL/L (ref 98–107)
CLARITY UR: CLEAR
CO2 SERPL-SCNC: 27.8 MMOL/L (ref 22–29)
COLOR UR: YELLOW
CREAT SERPL-MCNC: 2 MG/DL (ref 0.76–1.27)
DEPRECATED RDW RBC AUTO: 56.9 FL (ref 37–54)
EOSINOPHIL # BLD AUTO: 0.83 10*3/MM3 (ref 0–0.4)
EOSINOPHIL NFR BLD AUTO: 9.4 % (ref 0.3–6.2)
ERYTHROCYTE [DISTWIDTH] IN BLOOD BY AUTOMATED COUNT: 15.6 % (ref 12.3–15.4)
EXPIRATION DATE: NORMAL
GFR SERPL CREATININE-BSD FRML MDRD: 32 ML/MIN/1.73
GLOBULIN UR ELPH-MCNC: 3.5 GM/DL
GLUCOSE SERPL-MCNC: 109 MG/DL (ref 65–99)
GLUCOSE UR STRIP-MCNC: NEGATIVE MG/DL
HCT VFR BLD AUTO: 37.1 % (ref 37.5–51)
HGB BLD-MCNC: 11.4 G/DL (ref 13–17.7)
HGB UR QL STRIP.AUTO: NEGATIVE
IMM GRANULOCYTES # BLD AUTO: 0.02 10*3/MM3 (ref 0–0.05)
IMM GRANULOCYTES NFR BLD AUTO: 0.2 % (ref 0–0.5)
INTERNAL CONTROL: NORMAL
KETONES UR QL STRIP: NEGATIVE
LEUKOCYTE ESTERASE UR QL STRIP.AUTO: NEGATIVE
LYMPHOCYTES # BLD AUTO: 1.14 10*3/MM3 (ref 0.7–3.1)
LYMPHOCYTES NFR BLD AUTO: 12.9 % (ref 19.6–45.3)
Lab: NORMAL
MCH RBC QN AUTO: 30.2 PG (ref 26.6–33)
MCHC RBC AUTO-ENTMCNC: 30.7 G/DL (ref 31.5–35.7)
MCV RBC AUTO: 98.4 FL (ref 79–97)
MONOCYTES # BLD AUTO: 0.95 10*3/MM3 (ref 0.1–0.9)
MONOCYTES NFR BLD AUTO: 10.7 % (ref 5–12)
NEUTROPHILS NFR BLD AUTO: 5.85 10*3/MM3 (ref 1.7–7)
NEUTROPHILS NFR BLD AUTO: 65.9 % (ref 42.7–76)
NITRITE UR QL STRIP: NEGATIVE
NT-PROBNP SERPL-MCNC: 1020 PG/ML (ref 0–1800)
PH UR STRIP.AUTO: 5.5 [PH] (ref 5–8)
PLATELET # BLD AUTO: 169 10*3/MM3 (ref 140–450)
PMV BLD AUTO: 9.7 FL (ref 6–12)
POTASSIUM SERPL-SCNC: 4.8 MMOL/L (ref 3.5–5.2)
PROT SERPL-MCNC: 7.4 G/DL (ref 6–8.5)
PROT UR QL STRIP: NEGATIVE
RBC # BLD AUTO: 3.77 10*6/MM3 (ref 4.14–5.8)
SARS-COV-2 AG UPPER RESP QL IA.RAPID: NOT DETECTED
SODIUM SERPL-SCNC: 148 MMOL/L (ref 136–145)
SP GR UR STRIP: 1.01 (ref 1–1.03)
UROBILINOGEN UR QL STRIP: NORMAL
WBC # BLD AUTO: 8.87 10*3/MM3 (ref 3.4–10.8)

## 2021-08-10 PROCEDURE — 87426 SARSCOV CORONAVIRUS AG IA: CPT | Performed by: FAMILY MEDICINE

## 2021-08-10 PROCEDURE — 85025 COMPLETE CBC W/AUTO DIFF WBC: CPT

## 2021-08-10 PROCEDURE — 74018 RADEX ABDOMEN 1 VIEW: CPT

## 2021-08-10 PROCEDURE — 36415 COLL VENOUS BLD VENIPUNCTURE: CPT | Performed by: FAMILY MEDICINE

## 2021-08-10 PROCEDURE — 83880 ASSAY OF NATRIURETIC PEPTIDE: CPT

## 2021-08-10 PROCEDURE — 81003 URINALYSIS AUTO W/O SCOPE: CPT | Performed by: FAMILY MEDICINE

## 2021-08-10 PROCEDURE — 80053 COMPREHEN METABOLIC PANEL: CPT | Performed by: FAMILY MEDICINE

## 2021-08-10 PROCEDURE — 99214 OFFICE O/P EST MOD 30 MIN: CPT | Performed by: FAMILY MEDICINE

## 2021-08-10 PROCEDURE — 93005 ELECTROCARDIOGRAM TRACING: CPT | Performed by: FAMILY MEDICINE

## 2021-08-10 RX ORDER — ATORVASTATIN CALCIUM 80 MG/1
80 TABLET, FILM COATED ORAL DAILY
COMMUNITY
Start: 2021-02-03 | End: 2022-01-01 | Stop reason: SDUPTHER

## 2021-08-10 NOTE — PROGRESS NOTES
Blaine Morales presents to [facility].    Chief Complaint: URI and dysuria  Subjective       History of Present Illness:  HPI  Huge change in his health this past weekend, with upper respiratory wheezing and congestion, with generalized weakness, he feels tired and he is slow.  He is grieving over loss of his wife.  He is having trouble coping with this.  He is sleeping a lot.  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  He does have some dementia (questionable if vascular or alzheimers).  He was shaking on Sunday and this was similar symptoms when he had his last MI.      He does have swelling in legs and he wears JOAQUIM hose. He has CKD but has not seen Nephrologist for few years.   He has not had other heart failure symptoms.  No orthopnea, PND, or worsening LI.    Hes been vaccinated for COVID.    Review of Systems:  Review of Systems   Constitutional: Positive for fatigue. Negative for chills and fever.   HENT: Positive for congestion. Negative for ear pain and sore throat.    Respiratory: Positive for cough, shortness of breath and wheezing.    Cardiovascular: Negative for chest pain and palpitations.   Gastrointestinal: Negative for abdominal pain, constipation, diarrhea, nausea and vomiting.   Neurological: Positive for dizziness. Negative for headache.        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   • Atherosclerosis of native arteries of extremities with intermittent claudication, right leg (CMS/HCC)   • Cardiac arrhythmia, unspecified   • Causalgia of right lower limb   • Cervicalgia   • Chronic kidney disease, stage 3 (CMS/HCC)    MODERATE    • Chronic kidney disease, stage 3b (CMS/[facility])   • Diabetes (CMS/[facility])   • Dizziness and giddiness   • Dyspnea, unspecified   • Edema, unspecified   • Essential (primary) hypertension   • Fecal impaction (CMS/[facility])   • Gout, unspecified   • Hallucinations, unspecified   • History of falling   • History of pneumonia    HOSPITALIZATION AT [facility]   • Idiopathic chronic gout, unspecified site, without tophus (tophi)   • Localized edema   • Male erectile dysfunction, unspecified   • Mixed hyperlipidemia   • Myocardial infarct (CMS/[facility])    ADMITTED ONCE   • Nonrheumatic aortic (valve) stenosis   • Other long term (current) drug therapy   • Other symptoms and signs involving cognitive functions and awareness   • Pain in right shoulder   • Presence of prosthetic heart valve   • Rash and other nonspecific skin eruption   • Skin cancer (melanoma) (CMS/[facility])   • Slow transit constipation   • Syncope and collapse   • Type 2 diabetes mellitus without complications (CMS/[facility])     Past Surgical History:   • ANGIOPLASTY    SFA POP ANGIOPLASTY WITH STENTING   • CARDIAC VALVE REPLACEMENT   • CAROTID ENDARTERECTOMY   • CORONARY ARTERY BYPASS GRAFT   • SKIN CANCER EXCISION    MELANOMA; FROM MID BACK -DR ZARATE   • SUBCLAVIAN ARTERY STENT      Family History   Problem Relation Age of Onset   • Dislocations Other    • Hypertension Other    • Stroke Mother         CAUSE OF DEATH   • Heart attack Father         CAUSE OF DEATH   • Cancer Brother         UNSPECIFIED TYPE   • Stroke Brother 75     Social History     Tobacco Use   • Smoking status: Former Smoker     Packs/day: 1.00     Years: 47.00     Pack years: 47.00     Start date:      Quit date:      Years since quittin.6   • Smokeless tobacco: Never Used   Substance Use Topics   • Alcohol use: Not Currently     Comment: RARE       Health Maintenance Due   Topic Date Due   • URINE MICROALBUMIN  Never done   • ZOSTER VACCINE (1 of 2) Never done   • DIABETIC EYE EXAM  2020         Immunization History   Administered Date(s) Administered   • COVID-19 (MODERNA) 06/01/2021, 06/01/2021, 06/29/2021, 06/29/2021   • Influenza, Unspecified 10/28/2020   • Pneumococcal Conjugate 13-Valent (PCV13) 06/30/2015, 02/03/2021, 02/03/2021   • Pneumococcal Polysaccharide (PPSV23) 01/25/2011   • Tdap 05/26/2016       Allergies   Allergen Reactions   • Hydrocodone-Acetaminophen Nausea And Vomiting     hydrocodone/apap 5-325mg   • Cefaclor Rash   • Penicillin G Unknown - Low Severity   • Sulfa Antibiotics Unknown - Low Severity        Medications:  Current Outpatient Medications on File Prior to Visit   Medication Sig   • allopurinol (ZYLOPRIM) 100 MG tablet Take 100 mg by mouth Daily.   • aspirin (ASPIR) 81 MG EC tablet Take 1 tablet by mouth Daily.   • atorvastatin (LIPITOR) 80 MG tablet Take 80 mg by mouth Daily.   • bumetanide (BUMEX) 0.5 MG tablet Take 0.5 mg by mouth Daily.   • clopidogrel (PLAVIX) 75 MG tablet Take 75 mg by mouth Daily.   • donepezil (ARICEPT) 10 MG tablet Take 1 tablet by mouth Daily.   • escitalopram (LEXAPRO) 5 MG tablet Take 1 tablet by mouth Daily.   • gabapentin (NEURONTIN) 300 MG capsule Take 300 mg by mouth 3 (Three) Times a Day.   • memantine (NAMENDA) 5 MG tablet Take 5 mg by mouth 2 (Two) Times a Day.   • metoprolol succinate XL (TOPROL-XL) 50 MG 24 hr tablet Take 50 mg by mouth Daily.   • multivitamin with minerals tablet tablet Take 1 tablet by mouth Daily.   • QUEtiapine (SEROquel) 25 MG tablet Take 0.5 tablets by mouth Every Night.   • traMADol (ULTRAM) 50 MG tablet Take 1-2 tablets by mouth 6-8 hours prn moderate-severe pain   • valsartan (DIOVAN) 320 MG tablet Take 1 tablet by mouth Daily.   • amLODIPine (NORVASC) 5 MG tablet    • hydrOXYzine (ATARAX) 25 MG tablet    • pravastatin (PRAVACHOL) 40 MG tablet      No current facility-administered medications on file prior to visit.       Vital Signs:   /65   Pulse 68   Temp 97 °F (36.1 °C) (Oral)   Ht 175 cm  "(68.9\")   Wt 89.7 kg (197 lb 12.8 oz)   BMI 29.30 kg/m²       Physical Exam:  Physical Exam  Cardiovascular:      Rate and Rhythm: Normal rate and regular rhythm.   Pulmonary:      Effort: Pulmonary effort is normal. No respiratory distress.      Breath sounds: No stridor. Rhonchi present. No wheezing or rales.   Chest:      Chest wall: No tenderness.   Abdominal:      General: Abdomen is flat. Bowel sounds are normal. There is distension.      Palpations: Abdomen is soft. There is no mass.      Tenderness: There is no abdominal tenderness. There is no guarding or rebound.      Hernia: No hernia is present.         Result Review      The following data was reviewed by Lucille Mckee MD on 08/10/2021.  Lab Results   Component Value Date    WBC 9.49 05/12/2021    HGB 10.90 (L) 05/12/2021    HCT 35.2 (L) 05/12/2021    .9 (H) 05/12/2021    .00 05/12/2021     Lab Results   Component Value Date    BUN 33 (H) 05/12/2021    CREATININE 1.71 (H) 05/12/2021    BCR 19 05/12/2021    K 5.0 05/12/2021    CO2 28 05/12/2021    CALCIUM 9.1 05/12/2021    ALBUMIN 3.7 05/12/2021    LABIL2 1.1 (L) 05/12/2021    AST 28 05/12/2021    ALT 27 05/12/2021     Lab Results   Component Value Date    CHLPL 160 11/02/2020    TRIG 145 11/02/2020    HDL 46 11/02/2020    LDL 85 11/02/2020     Lab Results   Component Value Date    TSH 1.740 05/03/2019     Lab Results   Component Value Date    HGBA1C 7.3 (H) 05/12/2021     No results found for: PSA                    Assessment and Plan:       {CC Problem List  Visit Diagnosis  ROS  Review (Popup)  Health Maintenance  Quality  BestPractice  Medications  SmartSets  SnapShot Encounters  Media :23}   Diagnoses and all orders for this visit:    1. Disorientation (Primary)-urine is clear.  EKG looks okay it does not look like he has had another MI.  Covid test was negative.  We will go ahead and check a chest x-ray and abdominal x-ray due to shortness of air and abdominal " distention.  We will also check a BNP for congestive heart failure.  I will make Dr. Allen aware of his current work-up and findings  -     Urinalysis With Culture If Indicated - Urine, Clean Catch  -     ECG 12 Lead    2. Myocardial infarct, old  -     ECG 12 Lead  -     POCT SARS-CoV-2 Antigen JENNIFER    3. Shortness of breath  -     Cancel: XR Chest PA & Lateral; Future  -     Comprehensive Metabolic Panel  -     CBC w AUTO Differential; Future  -     proBNP; Future    4. Abdominal distension  -     XR Abdomen KUB; Future    5. URI-we will check a chest x-ray and rule out pneumonia      Follow Up   No follow-ups on file.  Patient was given instructions and counseling regarding his condition or for health maintenance advice. Please see specific information pulled into the AVS if appropriate.

## 2021-08-10 NOTE — TELEPHONE ENCOUNTER
Karyn, daughter called requesting an appointment today for her dad.  She is reporting that he may have an upper respiratory infection with wheezing and coughing.  He has had his covid vaccines and and has very little risk.  She also wanted a UTI ruled out.  What she is describing sounds more like BPH with urinary hesitancy and dribbling, can't get his stream going.  There were no appointments today and an opening came open with Dr. Mckee.  Karyn called and she accepted.  Advised her that she can always take him to immediate care if needed and she was concerned that she would not get an antibiotic there and she feels like that is what he needs.

## 2021-08-11 ENCOUNTER — TELEPHONE (OUTPATIENT)
Dept: FAMILY MEDICINE CLINIC | Age: 86
End: 2021-08-11

## 2021-08-11 DIAGNOSIS — R06.02 SHORTNESS OF BREATH: Primary | ICD-10-CM

## 2021-08-11 NOTE — TELEPHONE ENCOUNTER
Caller: Karyn Kitchen    Relationship: Emergency Contact    Best call back number: [phone]    What test was performed: LABWORK AND XRAY    When was the test performed: 08/10/2021    Where was the test performed: IN HOUSE    Additional notes: KARYN, DAUGHTER OF PATIENT, REQUESTS CALLBACK TO GO OVER RESULTS.    KARYN STATES SHE WILL BE AVAILABLE ALL DAY EXCEPT BETWEEN  TODAY 08/11

## 2021-08-11 NOTE — TELEPHONE ENCOUNTER
Spoke with Karyn and went over each individual result with her.  She states that if his cough continues that she will bring him back in for the CXR.  She would like for me to send the results to Dr. Allen to review and be sure he did not want to see him any sooner.  She states that she thinks that there is some miralax at home of her mothers that she will give him for the constipation.

## 2021-08-14 NOTE — TELEPHONE ENCOUNTER
Visit, labs, and abd xray reviewed.   Agree ti get the CXR if symptoms persist.   If worsens follow up.     mas

## 2021-08-19 NOTE — NUR
AT 2153 PATIENT RECIEVED 4.65 mEq OF CALCIUM GLUCONATE, DEXTROSE 50%, ND 10
UNITS OF REGULAR INSULIN FOR A POTASSIUM LEVEL OF 6.1. BLOOD SUGAR PRIOR TO
ADMINISTRATION . PATIENT HAS BEEN CONFUSED SINCE ADMISSION. AROUND 0000
PATIENT BEGAN GETTING UP OUT OF BED TO "GO PAY BILLS" AND APPEARED VERY PALE
AND SHAKY. ANOTHER BLOOD SUGAR WAS OBTAINED WITH A READING OF 64. JUICE AND
CRACKERS WERE GIVEN TO THE PATIENT AND LEVI TAN HAS BEEN SITTING AT BEDSIDE
TO ENSURE PATIENT SAFETY. FOLLOW-UP BLOOD SUGAR AFTER INTERVENTION .
MANPREET PEREZ WAS NOTIFIED.

## 2021-08-20 NOTE — NUR
8/20/21 Mr. Cummins lives at home with 24 hour caregivers. He has a rw.
Discharge planning was discussed with his son/POA, Richard Cummins, [phone].
Mr. Cummins wishes for his father to return home with [facility]. Mr. Richard Cummins
was educated that [facility] cannot provide nursing currently. He voiced
understanding and wishes for PT through [facility]. - Please notify [facility] at [phone]
if patient discharges over the weekend.

## 2021-08-24 ENCOUNTER — TELEPHONE (OUTPATIENT)
Dept: FAMILY MEDICINE CLINIC | Age: 86
End: 2021-08-24

## 2021-08-24 NOTE — TELEPHONE ENCOUNTER
Caller: [facility]    Relationship to patient: Home Health    Best call back number: [phone]    Patient is needing: PATIENT HAS BEEN AT [facility] WAS RELEASED ON 08.23.2021. ULISESANIYA IS NEEDING A VERBAL ORDER FOR PHYSICAL THERAPY BY JUANA VIGIL.

## 2021-08-26 NOTE — NUR
8/26/21 Richard Cummins, son / [phone] or [phone] would like Mr. Cummins placed at [facility]. [facility] reviewed and will not accept
patient due COVID in their facility. Richard Cummins is not interested in
[facility] or [facility]. He will discuss other facilities with his family and
provide a list of other choices.

## 2021-08-27 ENCOUNTER — TELEPHONE (OUTPATIENT)
Dept: FAMILY MEDICINE CLINIC | Age: 86
End: 2021-08-27

## 2021-08-27 NOTE — TELEPHONE ENCOUNTER
Caller: [facility]    Best call back number: [phone]    Type of visit:HOSPITAL FOLLOW UP    Requested date: WITHIN HOSPITAL FOLLOW UP REQUIREMENT      Additional notes: [facility] CALLED TO SCHEDULE HOSPITAL FOLLOW UP APPT FOR PATIENT. HE IS GETTING DISCHARGED TODAY. THERE IS NO AVAILABILITY WITH PCP FOR A WHILE. ATTEMPTED TO WARM TRANSFER. PLEASE CALL PATIENT TO ADVISE.

## 2021-08-30 DIAGNOSIS — M25.511 ACUTE PAIN OF RIGHT SHOULDER: ICD-10-CM

## 2021-08-30 DIAGNOSIS — G30.9 ALZHEIMER DISEASE (HCC): Primary | ICD-10-CM

## 2021-08-30 DIAGNOSIS — F02.80 ALZHEIMER DISEASE (HCC): Primary | ICD-10-CM

## 2021-08-30 PROBLEM — E78.5 HYPERLIPIDEMIA: Status: ACTIVE | Noted: 2021-08-30

## 2021-08-30 PROBLEM — N18.30 CKD (CHRONIC KIDNEY DISEASE) STAGE 3, GFR 30-59 ML/MIN (HCC): Status: ACTIVE | Noted: 2020-06-01

## 2021-08-30 PROBLEM — I25.10 ARTERIOSCLEROSIS OF CORONARY ARTERY: Status: ACTIVE | Noted: 2021-08-30

## 2021-08-31 ENCOUNTER — TELEPHONE (OUTPATIENT)
Dept: FAMILY MEDICINE CLINIC | Age: 86
End: 2021-08-31

## 2021-08-31 RX ORDER — MEMANTINE HYDROCHLORIDE 10 MG/1
10 TABLET ORAL 2 TIMES DAILY
Qty: 60 TABLET | Refills: 1 | Status: SHIPPED | OUTPATIENT
Start: 2021-08-31 | End: 2021-01-01 | Stop reason: SDUPTHER

## 2021-08-31 RX ORDER — TRAMADOL HYDROCHLORIDE 50 MG/1
TABLET ORAL
Qty: 30 TABLET | Refills: 0 | Status: SHIPPED | OUTPATIENT
Start: 2021-08-31 | End: 2021-09-24 | Stop reason: SDUPTHER

## 2021-09-02 ENCOUNTER — TELEPHONE (OUTPATIENT)
Dept: FAMILY MEDICINE CLINIC | Age: 86
End: 2021-09-02

## 2021-09-08 ENCOUNTER — OFFICE VISIT (OUTPATIENT)
Dept: FAMILY MEDICINE CLINIC | Age: 86
End: 2021-09-08

## 2021-09-08 ENCOUNTER — LAB (OUTPATIENT)
Dept: LAB | Facility: HOSPITAL | Age: 86
End: 2021-09-08

## 2021-09-08 VITALS
DIASTOLIC BLOOD PRESSURE: 49 MMHG | SYSTOLIC BLOOD PRESSURE: 159 MMHG | WEIGHT: 186.4 LBS | HEART RATE: 69 BPM | BODY MASS INDEX: 25.25 KG/M2 | TEMPERATURE: 96 F | OXYGEN SATURATION: 96 % | HEIGHT: 72 IN

## 2021-09-08 DIAGNOSIS — R60.9 EDEMA, UNSPECIFIED TYPE: ICD-10-CM

## 2021-09-08 DIAGNOSIS — K52.9 GASTROENTERITIS: ICD-10-CM

## 2021-09-08 DIAGNOSIS — K52.9 GASTROENTERITIS: Primary | ICD-10-CM

## 2021-09-08 PROBLEM — I70.221 ATHEROSCLEROSIS OF NATIVE ARTERY OF RIGHT LOWER EXTREMITY WITH REST PAIN: Status: ACTIVE | Noted: 2020-07-28

## 2021-09-08 PROBLEM — G62.9 PERIPHERAL NERVE DISEASE: Status: ACTIVE | Noted: 2021-09-08

## 2021-09-08 PROBLEM — C44.90 MALIGNANT NEOPLASM OF SKIN: Status: ACTIVE | Noted: 2021-09-08

## 2021-09-08 PROBLEM — J18.9 PNEUMONIA: Status: ACTIVE | Noted: 2021-09-08

## 2021-09-08 PROBLEM — I71.9 AORTIC ANEURYSM (HCC): Status: ACTIVE | Noted: 2020-06-01

## 2021-09-08 PROBLEM — E87.5 HYPERKALEMIA: Status: ACTIVE | Noted: 2020-06-01

## 2021-09-08 PROBLEM — K57.92 DIVERTICULITIS: Status: ACTIVE | Noted: 2021-09-08

## 2021-09-08 PROBLEM — E66.9 OBESITY (BMI 30-39.9): Status: ACTIVE | Noted: 2021-09-08

## 2021-09-08 PROBLEM — R01.1 HEART MURMUR: Status: ACTIVE | Noted: 2021-09-08

## 2021-09-08 PROBLEM — I73.9 PAD (PERIPHERAL ARTERY DISEASE): Status: ACTIVE | Noted: 2020-06-10

## 2021-09-08 PROBLEM — R01.1 SYSTOLIC MURMUR: Status: ACTIVE | Noted: 2021-09-08

## 2021-09-08 PROBLEM — I65.29 STENOSIS OF CAROTID ARTERY: Status: ACTIVE | Noted: 2021-09-08

## 2021-09-08 PROBLEM — Z95.2 PRESENCE OF PROSTHETIC HEART VALVE: Status: ACTIVE | Noted: 2018-09-24

## 2021-09-08 PROBLEM — N28.9 KIDNEY DISEASE: Status: ACTIVE | Noted: 2021-09-08

## 2021-09-08 LAB
ALBUMIN SERPL-MCNC: 3.7 G/DL (ref 3.5–5.2)
ALBUMIN/GLOB SERPL: 1.3 G/DL
ALP SERPL-CCNC: 84 U/L (ref 39–117)
ALT SERPL W P-5'-P-CCNC: 23 U/L (ref 1–41)
ANION GAP SERPL CALCULATED.3IONS-SCNC: 4.4 MMOL/L (ref 5–15)
AST SERPL-CCNC: 23 U/L (ref 1–40)
BILIRUB SERPL-MCNC: 0.2 MG/DL (ref 0–1.2)
BUN SERPL-MCNC: 22 MG/DL (ref 8–23)
BUN/CREAT SERPL: 17.1 (ref 7–25)
CALCIUM SPEC-SCNC: 9.3 MG/DL (ref 8.6–10.5)
CHLORIDE SERPL-SCNC: 109 MMOL/L (ref 98–107)
CO2 SERPL-SCNC: 31.6 MMOL/L (ref 22–29)
CREAT SERPL-MCNC: 1.29 MG/DL (ref 0.76–1.27)
GFR SERPL CREATININE-BSD FRML MDRD: 53 ML/MIN/1.73
GLOBULIN UR ELPH-MCNC: 2.9 GM/DL
GLUCOSE SERPL-MCNC: 177 MG/DL (ref 65–99)
MAGNESIUM SERPL-MCNC: 2.3 MG/DL (ref 1.6–2.4)
POTASSIUM SERPL-SCNC: 4.9 MMOL/L (ref 3.5–5.2)
PROT SERPL-MCNC: 6.6 G/DL (ref 6–8.5)
SODIUM SERPL-SCNC: 145 MMOL/L (ref 136–145)

## 2021-09-08 PROCEDURE — 80053 COMPREHEN METABOLIC PANEL: CPT

## 2021-09-08 PROCEDURE — 83735 ASSAY OF MAGNESIUM: CPT

## 2021-09-08 PROCEDURE — 99214 OFFICE O/P EST MOD 30 MIN: CPT | Performed by: NURSE PRACTITIONER

## 2021-09-08 PROCEDURE — 1111F DSCHRG MED/CURRENT MED MERGE: CPT | Performed by: NURSE PRACTITIONER

## 2021-09-08 PROCEDURE — 36415 COLL VENOUS BLD VENIPUNCTURE: CPT

## 2021-09-08 RX ORDER — PROMETHAZINE HYDROCHLORIDE 12.5 MG/1
12.5 TABLET ORAL EVERY 6 HOURS PRN
COMMUNITY
Start: 2021-08-27 | End: 2022-01-01

## 2021-09-08 RX ORDER — BUMETANIDE 0.5 MG/1
0.5 TABLET ORAL DAILY PRN
Qty: 30 TABLET | Refills: 0 | Status: SHIPPED | OUTPATIENT
Start: 2021-09-08 | End: 2021-01-01 | Stop reason: DRUGHIGH

## 2021-09-08 RX ORDER — ONDANSETRON 4 MG/1
4 TABLET, ORALLY DISINTEGRATING ORAL EVERY 8 HOURS PRN
COMMUNITY
Start: 2021-08-27 | End: 2022-01-01 | Stop reason: SDUPTHER

## 2021-09-08 NOTE — PROGRESS NOTES
"Chief Complaint  Hospital Follow Up Visit ([facility] [place], 1st dehydration & 2nd time vomitting )    Subjective          Blaine Morales presents to [facility]     Transition of Care appointment:   Blaine Morales is here today following a transition of care from an inpatient hospital.  he was admitted to [facility] for Dehydration.  The patient was admitted on 8/18/21, discharged 8/23/21.  We attempted to contact the patient.  Appointment was scheduled by the hospital within 48 hours of discharge.  During the patient's hospital stay, the patient was treated by  .    Transition of Care appointment:   Blaine Morales is here today following a transition of care from an inpatient hospital.  he was admitted to [facility] for severe vomiting.  The patient was admitted on 8/24/21, discharged 8/27/21.  We attempted to contact the patient.  Appointment was scheduled by the hospital within 48 hours of discharge.  During the patient's hospital stay, the patient was treated by  .    Mr Morales is here today with his daughter Karyn for follow up after 2 recent hospital stays.  Notes improvement in symptoms.  Did have some persistent nausea when initially discharged but this has improved.  Was discharged with Zofran and Phenergan prescriptions but has no longer had to take.  Some electrolyte abnormalities noted with initial hospitalization.  Allopurinol and Bumex were stopped due to the dehydration.  Mild shortness of breath with exertion.  Bumex remains on hold at this time.  Denies lower extremity swelling.  He does have caregivers coming to the home.  Medical history is significant for DM,  MI earlier this year, CKD, dementia.          Objective   Vital Signs:   /49   Pulse 69   Temp 96 °F (35.6 °C)   Ht 182.9 cm (72\")   Wt 84.6 kg (186 lb 6.4 oz)   SpO2 96%   BMI 25.28 kg/m²       Physical Exam  Vitals reviewed.   Constitutional:       " General: He is not in acute distress.     Appearance: Normal appearance. He is well-developed.   HENT:      Head: Normocephalic and atraumatic.   Cardiovascular:      Rate and Rhythm: Normal rate and regular rhythm.   Pulmonary:      Effort: Pulmonary effort is normal.      Breath sounds: Normal breath sounds.   Musculoskeletal:      Right lower leg: No edema.      Left lower leg: No edema.   Neurological:      Mental Status: He is alert. Mental status is at baseline.   Psychiatric:         Mood and Affect: Mood and affect normal.          Result Review :   The following data was reviewed by: LIANA Perera on 09/08/2021:  SCANNED - LABS (08/25/2021)  SCANNED - LABS (08/21/2021)  proBNP (08/10/2021 13:43)  CBC w AUTO Differential (08/10/2021 13:43)  Comprehensive Metabolic Panel (08/10/2021 13:43)  POCT SARS-CoV-2 Antigen JENNIFER (08/10/2021 13:05)  Urinalysis With Culture If Indicated - Urine, Clean Catch (08/10/2021 12:50)  SCANNED - IMAGING (08/25/2021)  SCANNED - IMAGING (08/19/2021)  SCANNED - IMAGING (08/19/2021)  SCANNED - ECHOCARDIOGRAM (08/19/2021)  DISCHARGE SUMMARY - SCAN - FLAGET DISCHARGE SUMMARY 8/27/21 (08/27/2021)           Assessment and Plan    Diagnoses and all orders for this visit:    1. Gastroenteritis (Primary)  -     Comprehensive Metabolic Panel; Future  -     Magnesium; Future    2. Edema, unspecified type  -     bumetanide (BUMEX) 0.5 MG tablet; Take 1 tablet by mouth Daily As Needed (fluid overload).  Dispense: 30 tablet; Refill: 0      Reviewed with patient's PCP Dr. Allen.  Will repeat labs today to follow-up on any electrolyte abnormalities.  Continue to hold allopurinol.  Can take Bumex daily as needed for symptoms of fluid overload such as lower extremity edema or weight gain.  No acute concerns noted on exam today.  We will keep scheduled follow-up with PCP next month.  To be seen sooner if develops concerning symptoms.    I spent 40 minutes caring for Blaine on this date of  service. This time includes time spent by me in the following activities:preparing for the visit, reviewing tests, obtaining and/or reviewing a separately obtained history, performing a medically appropriate examination and/or evaluation , counseling and educating the patient/family/caregiver, ordering medications, tests, or procedures, referring and communicating with other health care professionals  and documenting information in the medical record  Follow Up    Return for Next scheduled follow up.     Call results of labs to daughter Karyn Kitchen [phone].    Patient was given instructions and counseling regarding his condition or for health maintenance advice. Please see specific information pulled into the AVS if appropriate.

## 2021-09-20 ENCOUNTER — TELEPHONE (OUTPATIENT)
Dept: CASE MANAGEMENT | Facility: OTHER | Age: 86
End: 2021-09-20

## 2021-09-20 DIAGNOSIS — I10 HYPERTENSION, UNSPECIFIED TYPE: Primary | ICD-10-CM

## 2021-09-20 RX ORDER — AMLODIPINE BESYLATE 2.5 MG/1
2.5 TABLET ORAL NIGHTLY
COMMUNITY
End: 2021-09-20 | Stop reason: SDUPTHER

## 2021-09-20 RX ORDER — AMLODIPINE BESYLATE 2.5 MG/1
2.5 TABLET ORAL NIGHTLY
Qty: 14 TABLET | Refills: 0 | Status: SHIPPED | OUTPATIENT
Start: 2021-09-20 | End: 2021-09-24 | Stop reason: SDUPTHER

## 2021-09-20 NOTE — TELEPHONE ENCOUNTER
Will add to nighttime dose, out of 5mg dose, will send in 2.5mg dose.  Will send in #14 until he gets his box filled again at the beginning of the month.

## 2021-09-20 NOTE — TELEPHONE ENCOUNTER
Lets reinitiate amlodipine, but at a lower dose of 2.5 mg daily.  He can take HALF of the 5 mg tablet daily till gone a month then we can send in a new prescription for a 2.5 mg tablet.  Continue to check his vital signs and bring a list of readings to his appointment next month    [facility]  Dagoberto Allen MD

## 2021-09-20 NOTE — TELEPHONE ENCOUNTER
Karyn called to report that her dad's blood pressure has been running high.  She initially reports that it was running in the 180-190/80-90's, but she also within that list reported some 140-150's.  I asked if those reading were taken first thing in the am before he had taken any medication, she wasn't certain due to caregiver taking, but she felt like they were.  Regardless, he needs more even consistent readings than the fluctuations that she was reporting.  Called the pharmacy to see what time he is taking his medications thinking we may separate and take one at nighttime.  He takes his Valsartan at 9am.  He takes his metoprolol Er 50mg at 12 noon.  I asked about the amlodipine I saw on his list and it had been d/c in May with the increase of the valsartan due to pedal edema.  He also sees Hernandez Spicer (but hasn't been seen since December of last year) and can direct the question toward them if you would like - both you and Hernandez are managing 2 different agents.  Any suggestions or additions to help keep his blood pressure lower in am?

## 2021-09-24 DIAGNOSIS — M25.511 ACUTE PAIN OF RIGHT SHOULDER: ICD-10-CM

## 2021-09-24 DIAGNOSIS — I10 HYPERTENSION, UNSPECIFIED TYPE: ICD-10-CM

## 2021-09-24 RX ORDER — TRAMADOL HYDROCHLORIDE 50 MG/1
TABLET ORAL
Qty: 30 TABLET | Refills: 0 | Status: SHIPPED | OUTPATIENT
Start: 2021-09-24 | End: 2021-01-01 | Stop reason: SDUPTHER

## 2021-09-24 RX ORDER — AMLODIPINE BESYLATE 2.5 MG/1
2.5 TABLET ORAL NIGHTLY
Qty: 28 TABLET | Refills: 2 | Status: SHIPPED | OUTPATIENT
Start: 2021-09-24 | End: 2021-01-01 | Stop reason: SDUPTHER

## 2021-09-24 RX ORDER — ASPIRIN 81 MG/1
81 TABLET ORAL DAILY
Qty: 28 TABLET | Refills: 2 | Status: SHIPPED | OUTPATIENT
Start: 2021-09-24 | End: 2021-01-01 | Stop reason: SDUPTHER

## 2021-09-24 NOTE — TELEPHONE ENCOUNTER
ASA  LF:3/23/21 #28 w/ 1RF  Amlodipine  LF:9/20/21 #14  Tramadol  LF:8/31/21 #30  LV:5/24/21  Tox:12/10/21     Patient calling to give update on how he is feeling.    Please call patient

## 2021-09-29 ENCOUNTER — TELEPHONE (OUTPATIENT)
Dept: CASE MANAGEMENT | Facility: OTHER | Age: 86
End: 2021-09-29

## 2021-09-29 NOTE — TELEPHONE ENCOUNTER
Kayrn called to report that her dad has a rash that has flared up and now spread to his bottom.  The occupational therapist looked at it and she thought it looked fungal in nature. They were going to use Lamisil otc but also stated they had some Clotrimazole that they use on his feet.  Karyn says he is scratching so much in the middle of the night that it is bleeding some. They also have some Triamcinolone written by Belgica Goodson that was filled in June that they might use.  Suggested they could use some benadryl cream to help the itching.  They tried using the clotrimazole and it burned.  She will touch base with me on Monday if he is not any better.

## 2021-10-20 ENCOUNTER — HOSPITAL ENCOUNTER (INPATIENT)
Dept: HOSPITAL 49 - FER | Age: 86
LOS: 4 days | Discharge: HOME HEALTH | DRG: 291 | End: 2021-10-24
Attending: INTERNAL MEDICINE | Admitting: INTERNAL MEDICINE
Payer: MEDICARE

## 2021-10-20 VITALS — BODY MASS INDEX: 23.63 KG/M2 | WEIGHT: 174.44 LBS | HEIGHT: 72.01 IN

## 2021-10-20 DIAGNOSIS — I13.0: Primary | ICD-10-CM

## 2021-10-20 DIAGNOSIS — N18.30: ICD-10-CM

## 2021-10-20 DIAGNOSIS — Z88.0: ICD-10-CM

## 2021-10-20 DIAGNOSIS — Z82.49: ICD-10-CM

## 2021-10-20 DIAGNOSIS — G30.9: ICD-10-CM

## 2021-10-20 DIAGNOSIS — N17.9: ICD-10-CM

## 2021-10-20 DIAGNOSIS — Z98.890: ICD-10-CM

## 2021-10-20 DIAGNOSIS — L89.311: ICD-10-CM

## 2021-10-20 DIAGNOSIS — E78.5: ICD-10-CM

## 2021-10-20 DIAGNOSIS — Z95.1: ICD-10-CM

## 2021-10-20 DIAGNOSIS — Z87.891: ICD-10-CM

## 2021-10-20 DIAGNOSIS — G89.29: ICD-10-CM

## 2021-10-20 DIAGNOSIS — F02.80: ICD-10-CM

## 2021-10-20 DIAGNOSIS — Z88.2: ICD-10-CM

## 2021-10-20 DIAGNOSIS — L89.321: ICD-10-CM

## 2021-10-20 DIAGNOSIS — Z20.822: ICD-10-CM

## 2021-10-20 DIAGNOSIS — Z95.820: ICD-10-CM

## 2021-10-20 DIAGNOSIS — I25.10: ICD-10-CM

## 2021-10-20 DIAGNOSIS — I35.0: ICD-10-CM

## 2021-10-20 DIAGNOSIS — J96.01: ICD-10-CM

## 2021-10-20 DIAGNOSIS — I50.33: ICD-10-CM

## 2021-10-20 DIAGNOSIS — I25.2: ICD-10-CM

## 2021-10-20 LAB
ALBUMIN SERPL-MCNC: 3.3 G/DL (ref 3.4–5)
ALKALINE PHOSHATASE: 101 U/L (ref 46–116)
ALT SERPL-CCNC: 35 U/L (ref 16–63)
AST: 19 U/L (ref 15–37)
BASOPHIL: 1.2 % (ref 0–2)
BILIRUBIN - TOTAL: 0.2 MG/DL (ref 0.2–1)
BUN SERPL-MCNC: 32 MG/DL (ref 7–18)
BUN/CREAT RATIO (CALC): 20 RATIO
CHLORIDE: 108 MMOL/L (ref 98–107)
CO2 (BICARBONATE): 33 MMOL/L (ref 21–32)
CORONAVIRUS 2019 SARS-COV-2: NEGATIVE
CREATININE: 1.6 MG/DL (ref 0.67–1.17)
EOSINOPHIL: 8.2 % (ref 0–7)
GLOBULIN (CALCULATION): 4 G/DL
GLUCOSE SERPL-MCNC: 142 MG/DL (ref 74–106)
HCT: 41.1 % (ref 42–52)
HGB BLD-MCNC: 12.3 G/DL (ref 13.2–18)
INFLUENZA A NAA: NEGATIVE
LYMPHOCYTE: 11.9 % (ref 15–48)
MCH RBC QN AUTO: 29.8 PG (ref 25–31)
MCHC RBC AUTO-ENTMCNC: 29.9 G/DL (ref 32–36)
MCV: 99.5 FL (ref 78–100)
MONOCYTE: 11.9 % (ref 0–12)
MPV: 10.9 FL (ref 6–9.5)
NEUTROPHIL: 66.3 % (ref 41–80)
NRBC: 0
PLT: 182 K/UL (ref 150–400)
POTASSIUM: 4.8 MMOL/L (ref 3.5–5.1)
PRO-BNP: 801 PG/ML (ref ?–450)
RBC MORPHOLOGY: NORMAL
RBC: 4.13 M/UL (ref 4.7–6)
RDW: 16.1 % (ref 11.5–14)
TOTAL PROTEIN: 7.3 G/DL (ref 6.4–8.2)
WBC: 8.5 K/UL (ref 4–10.5)

## 2021-10-20 PROCEDURE — G0378 HOSPITAL OBSERVATION PER HR: HCPCS

## 2021-10-20 PROCEDURE — U0002 COVID-19 LAB TEST NON-CDC: HCPCS

## 2021-10-20 NOTE — PROGRESS NOTES
"Chief Complaint  Headache (for 2 weeks/decrease 02 sats/SOA)    Subjective    Patient is 88 year old male in today with complaints of headache that began two weeks ago, 10 pound weight gain and increased shortness of breath. Patient is accompanied by his daughter who reports he is also shaky and fatigue. Patient falling asleep during visit. Daughter also reports a rash on the patients bottom and lower legs.          Blaine Morales presents to [facility]  Headache   This is a new problem. The current episode started 1 to 4 weeks ago. The problem occurs daily. The problem has been gradually worsening. The pain is located in the bilateral region. The pain does not radiate. The pain quality is similar to prior headaches. The quality of the pain is described as aching. The pain is at a severity of 6/10. The pain is mild. Associated symptoms include dizziness and a loss of balance. Nothing aggravates the symptoms.   Congestive Heart Failure  Presents for initial visit. The disease course has been worsening. Associated symptoms include chest pain, edema, fatigue, near-syncope, shortness of breath and unexpected weight change. The symptoms have been worsening. The patient is experiencing no pain. The pain does not radiate. Chest pain occurs with exertion. Past treatments include salt and fluid restriction. The treatment provided no relief. Compliance with prior treatments has been good.       Objective   Vital Signs:   /61 (BP Location: Right arm, Patient Position: Sitting)   Pulse 61   Temp 97.4 °F (36.3 °C) (Oral)   Ht 182.9 cm (72.01\")   Wt 89.6 kg (197 lb 9.6 oz)   SpO2 (!) 88% Comment: RA  BMI 26.79 kg/m²     Physical Exam  Constitutional:       General: He is sleeping.   HENT:      Head: Normocephalic.   Cardiovascular:      Rate and Rhythm: Bradycardia present. Rhythm irregular.      Pulses: Decreased pulses.   Pulmonary:      Effort: Accessory muscle usage and " respiratory distress present.      Breath sounds: Examination of the right-upper field reveals rales. Examination of the left-upper field reveals rales. Examination of the right-middle field reveals rales. Examination of the left-middle field reveals rales. Examination of the right-lower field reveals rales. Examination of the left-lower field reveals rales. Rales present.   Chest:      Chest wall: No tenderness.   Musculoskeletal:      Right lower le+ Edema present.      Left lower le+ Edema present.   Skin:     General: Skin is warm and dry.      Coloration: Skin is pale.      Findings: Erythema present. No laceration or wound.          Neurological:      Mental Status: He is easily aroused. He is lethargic.      Gait: Gait abnormal.        Result Review :                 Assessment and Plan    Diagnoses and all orders for this visit:    1. Shortness of breath (Primary)  Comments:  Higher level of care needed at this time, patient transported per EMS to local ER        Follow Up   No follow-ups on file.  Patient was given instructions and counseling regarding his condition or for health maintenance advice. Please see specific information pulled into the AVS if appropriate.

## 2021-10-20 NOTE — NUR
OBTAINED ADMISSION DATA FROM DAUGHTER NITA PATTERSON [phone] AS PATIENT HAS
DEMENTIA AND IS A POOR HISTORIAN.

## 2021-10-21 LAB
ALBUMIN SERPL-MCNC: 2.9 G/DL (ref 3.4–5)
ALKALINE PHOSHATASE: 91 U/L (ref 46–116)
ALT SERPL-CCNC: 29 U/L (ref 16–63)
AST: 20 U/L (ref 15–37)
BASOPHIL: 1.1 % (ref 0–2)
BILIRUBIN - TOTAL: 0.2 MG/DL (ref 0.2–1)
BILIRUBIN: NEGATIVE MG/DL
BLOOD: (no result) ERY/UL
BUN SERPL-MCNC: 29 MG/DL (ref 7–18)
BUN/CREAT RATIO (CALC): 19.5 RATIO
CHLORIDE: 110 MMOL/L (ref 98–107)
CK MB SERPL-MCNC: 1.6 NG/ML (ref 0–3.6)
CLARITY UR: CLEAR
CO2 (BICARBONATE): 32 MMOL/L (ref 21–32)
COLOR: YELLOW
CREATININE: 1.49 MG/DL (ref 0.67–1.17)
EOSINOPHIL: 6.6 % (ref 0–7)
GLOBULIN (CALCULATION): 3.2 G/DL
GLUCOSE (U): NORMAL MG/DL
GLUCOSE SERPL-MCNC: 135 MG/DL (ref 74–106)
HCT: 37.2 % (ref 42–52)
HGB BLD-MCNC: 11.5 G/DL (ref 13.2–18)
IRON % SATURATION: 18.8 %SAT (ref 20–50)
IRON SERPL-MCNC: 56 UG/DL (ref 65–175)
LEUKOCYTES: NEGATIVE LEU/UL
LYMPHOCYTE: 10.2 % (ref 15–48)
MAGNESIUM SERPL-MCNC: 2.4 MG/DL (ref 1.8–2.4)
MCH RBC QN AUTO: 30.5 PG (ref 25–31)
MCHC RBC AUTO-ENTMCNC: 30.9 G/DL (ref 32–36)
MCV: 98.7 FL (ref 78–100)
MONOCYTE: 9.1 % (ref 0–12)
MPV: 10.6 FL (ref 6–9.5)
NEUTROPHIL: 72.8 % (ref 41–80)
NITRITE: NEGATIVE MG/DL
NRBC: 0
PHOSPHORUS: 4 MG/DL (ref 2.6–4.7)
PLT: 170 K/UL (ref 150–400)
POTASSIUM: 5.3 MMOL/L (ref 3.5–5.1)
PRO-BNP: 1083 PG/ML (ref ?–450)
PROTEIN: NEGATIVE MG/DL
RBC MORPHOLOGY: NORMAL
RBC: 3.77 M/UL (ref 4.7–6)
RDW: 15.8 % (ref 11.5–14)
SPECIFIC GRAVITY: 1.02 (ref 1–1.03)
TOTAL PROTEIN: 6.1 G/DL (ref 6.4–8.2)
URINARY RBC: (no result)
UROBILINOGEN: 0.2 MG/DL (ref 0.2–1)
WBC: 8.8 K/UL (ref 4–10.5)

## 2021-10-21 NOTE — NUR
TC TO DAUGHTER, NITA, WHO IS PT. POA.  SHE ADVISED THAT HER BROTHER, TONY IS
CURRENTLY WITH HER FATHER.  MET WITH TONY WHO IS ALSO POA AND HE SIGNED THE
IM.
 
NITA ALSO ADVISED THAT HER FATHER HAS 24 HR CAREGIVERS AT HOME AND HE WAS
USING [facility], BUT THEY ARE NO LONGER COMING. SHE WOULD LIKE [facility] AGAIN IF HH
IS ORDERED.

## 2021-10-21 NOTE — TELEPHONE ENCOUNTER
We should probably wait until he is discharged from the hospital to make sure medications are not changed

## 2021-10-22 LAB
BUN SERPL-MCNC: 29 MG/DL (ref 7–18)
BUN/CREAT RATIO (CALC): 18.4 RATIO
CHLORIDE: 108 MMOL/L (ref 98–107)
CO2 (BICARBONATE): 36 MMOL/L (ref 21–32)
CREATININE: 1.58 MG/DL (ref 0.67–1.17)
GLUCOSE SERPL-MCNC: 102 MG/DL (ref 74–106)
MAGNESIUM SERPL-MCNC: 2.4 MG/DL (ref 1.8–2.4)
PHOSPHORUS: 3.6 MG/DL (ref 2.6–4.7)
POTASSIUM: 4.6 MMOL/L (ref 3.5–5.1)

## 2021-10-23 LAB
BASOPHIL: 1.2 % (ref 0–2)
BUN SERPL-MCNC: 27 MG/DL (ref 7–18)
BUN/CREAT RATIO (CALC): 16.2 RATIO
CHLORIDE: 106 MMOL/L (ref 98–107)
CO2 (BICARBONATE): 38 MMOL/L (ref 21–32)
CREATININE: 1.67 MG/DL (ref 0.67–1.17)
EOSINOPHIL: 6.5 % (ref 0–7)
GLUCOSE SERPL-MCNC: 117 MG/DL (ref 74–106)
HCT: 40.4 % (ref 42–52)
HGB BLD-MCNC: 12.6 G/DL (ref 13.2–18)
LYMPHOCYTE: 13.2 % (ref 15–48)
MAGNESIUM SERPL-MCNC: 2.3 MG/DL (ref 1.8–2.4)
MCH RBC QN AUTO: 29.7 PG (ref 25–31)
MCHC RBC AUTO-ENTMCNC: 31.2 G/DL (ref 32–36)
MCV: 95.3 FL (ref 78–100)
MONOCYTE: 11.7 % (ref 0–12)
MPV: 10.5 FL (ref 6–9.5)
NEUTROPHIL: 67.1 % (ref 41–80)
NRBC: 0
PLT: 187 K/UL (ref 150–400)
POTASSIUM: 4.3 MMOL/L (ref 3.5–5.1)
PRO-BNP: 1663 PG/ML (ref ?–450)
RBC MORPHOLOGY: NORMAL
RBC: 4.24 M/UL (ref 4.7–6)
RDW: 15.3 % (ref 11.5–14)
WBC: 7.4 K/UL (ref 4–10.5)

## 2021-10-24 LAB
BASOPHIL: 0.7 % (ref 0–2)
BUN SERPL-MCNC: 26 MG/DL (ref 7–18)
BUN/CREAT RATIO (CALC): 14.3 RATIO
CHLORIDE: 104 MMOL/L (ref 98–107)
CO2 (BICARBONATE): 34 MMOL/L (ref 21–32)
CREATININE: 1.82 MG/DL (ref 0.67–1.17)
EOSINOPHIL: 4.6 % (ref 0–7)
GLUCOSE SERPL-MCNC: 104 MG/DL (ref 74–106)
HCT: 40 % (ref 42–52)
HGB BLD-MCNC: 12.8 G/DL (ref 13.2–18)
LYMPHOCYTE: 12.7 % (ref 15–48)
MAGNESIUM SERPL-MCNC: 2.2 MG/DL (ref 1.8–2.4)
MCH RBC QN AUTO: 30.3 PG (ref 25–31)
MCHC RBC AUTO-ENTMCNC: 32 G/DL (ref 32–36)
MCV: 94.6 FL (ref 78–100)
MONOCYTE: 12.3 % (ref 0–12)
MPV: 10.4 FL (ref 6–9.5)
NEUTROPHIL: 69.5 % (ref 41–80)
NRBC: 0
PLT: 187 K/UL (ref 150–400)
POTASSIUM: 4.2 MMOL/L (ref 3.5–5.1)
RBC MORPHOLOGY: NORMAL
RBC: 4.23 M/UL (ref 4.7–6)
RDW: 15.2 % (ref 11.5–14)
WBC: 8.1 K/UL (ref 4–10.5)

## 2021-10-25 NOTE — OUTREACH NOTE
Patient Outreach    Called and spoke with Karyn.  Her dad got out of the hospital and conveniently has a follow up this Friday.  His bumex was increased from 0.5mg to 1mg, despite him not taking.  His last hospital visit the bumex was stopped (in August) and was told to take PRN but was given no guidelines on when to take it.  It was stopped due to dehydration.  He will need a follow up on CT chest for a lung nodule in 3 months.

## 2021-10-26 NOTE — TELEPHONE ENCOUNTER
"Marialuisa, home health nurse was out to see AG today and she reports a change in him from yesterday.  Very lethargic, hands are cold to assess O2 sat, b/p 84/54, HR 56, O2 sat 88-90%, dizziness and home care reports he \"almost went down\" earlier today.  Increased confusion and complaining of hurting all over.  Lungs are clear.  No appetite.  Drinking ok.  She recommends that he go to the ER for an assessment.  Will report to Dr. Allen.   "

## 2021-10-27 NOTE — TELEPHONE ENCOUNTER
Karyn called reporting that her dad was discharged from [facility], too soon she thought.  There were some medication changes but Karyn did not have them in front of her, Valsartan and metoprolol and statin.  She was told that there was some concern that he took an extra dose of medication, but AG does not administer his own meds, so that is not a possibility.  She did not know what to do with the medication that Verna pre-packages just in case Dr. Allen changes it back.  Told her to follow discharge instructions and she can have the box corrected next week.  She had already been to the [facility] in [place] to learn which pills were which so she could make the change for today - Verna was off.  Printed discharge summary but cannot locate in Epic.  Attempting to log into [facility] emr again to see which changes were made. No luck

## 2021-10-27 NOTE — TELEPHONE ENCOUNTER
Noted.    The findings of that evaluation can inform our discussion on Friday regarding Goals of Care.

## 2021-10-28 NOTE — TELEPHONE ENCOUNTER
Karyn called late yesterday stating that her dad had been discharged and several medications had changed.  However she was not at home and did not have the paper that listed the changes so she was speaking off the top of her head.  Karyn thinks that the bumex was cut in 1/2 and metoprolol was 1/2, something about the valsartan but she couldn't confirm and he questioned the statin.  I could not log into [facility] but was able today.  Verna called me from [facility] this morning to get clarification.  She had received prescriptions for Bumex 0.5mg (1/2 dose) and metoprolol er 25mg (1/2 of dose). [facility] discharge summary stated that his bumex was cut in 1/2 - 0.5mg and to stop his ACE.  No mention of metoprolol.  Called [facility] and spoke with the discharging physician- Dr. Casillas.  He stated that he did indeed cut the bumex in 1/2 due to over diuresis and AMANDA.  Explained that he is not on an ACE as stated in d/c summary but an ARB, he is holding that med until he is seen by Dr. Allen on Friday.  He stated that he was only seeking clarification on statin due to 2 were listed in his medication list.  He did cut the metoprolol in 1/2.  I asked him to add an addendum to his discharge summary stating the above since it was inaccurate and not complete.     [place]illo

## 2021-10-29 PROBLEM — N18.4 ACUTE RENAL FAILURE SUPERIMPOSED ON STAGE 4 CHRONIC KIDNEY DISEASE (HCC): Status: ACTIVE | Noted: 2021-01-01

## 2021-10-29 PROBLEM — R91.1 LUNG NODULE: Status: ACTIVE | Noted: 2021-01-01

## 2021-10-29 PROBLEM — L89.321 PRESSURE INJURY OF LEFT BUTTOCK, STAGE 1: Status: ACTIVE | Noted: 2021-01-01

## 2021-10-29 PROBLEM — N17.9 ACUTE RENAL FAILURE SUPERIMPOSED ON STAGE 4 CHRONIC KIDNEY DISEASE: Status: ACTIVE | Noted: 2021-01-01

## 2021-11-01 NOTE — OUTREACH NOTE
CCM Interim Update    Called Karyn to discuss lab results.  Will monitor renal function with labs in 2 weeks.   Directions with medications reiterated with Karyn and also relayed to Marialuisa home health nurse.  Marialuisa also instructed with wound care monitoring for decubitus.  AG needs are increasing and will change his program from hrcm to ccm.  He was originally signed up for [facility], but due to needs being very minimal, changed to hr.      Ambulatory Case Management Note    Naomy Brunson RN  Ambulatory Case Management    11/1/2021, 10:06 EDT

## 2021-11-09 NOTE — OUTREACH NOTE
Ambulatory Case Management Note    Called and checked in with Karyn.  Things are going good as of yesterday, some swelling seems to be creeping back in but he is seeing vascular tomorrow and cardiology on Thursday.  He is due on the 13th for repeat bmp to assess renal function. Orders pended.      Naomy Brunson RN  Ambulatory Case Management    11/9/2021, 15:19 EST

## 2021-11-12 NOTE — OUTREACH NOTE
[facility] Interim Update    Reviewed cardiology note, placed 48 hour holter for bradycardia.  Also called for vascular note.     Ambulatory Case Management Note    There are no recently modified care plans to display for this patient.      Naomy Brunson RN  Ambulatory Case Management    11/12/2021, 14:55 EST

## 2021-11-15 NOTE — OUTREACH NOTE
[facility] Interim Update    Called and spoke with Karyn.  Chart reviewed and placed a holter monitor, returned today.  They saw Dr. Yanes last week and vascular.  No medication changes, BMP done today and will look for results.  He will be due for UDS in 12/2021 for his Tramadol.     Ambulatory Case Management Note      Naomy Brunson RN  Ambulatory Case Management    11/15/2021, 15:48 EST  [facility] Interim Update    11/16/2021 contacted Karyn to inform about lab work, improved.  She is reporting that the care givers called her reporting that he was having low heart rate and gained 5lbs. Karyn was instructed to call cardiology since he was just worked up for this same problem.

## 2021-11-16 NOTE — TELEPHONE ENCOUNTER
Shante, can you look into this a little bit for me. I do not think I have filled his medicine for quite a while. Has he is been on it from another provider? Epic says we need to run a manual [place] report in order to get an accurate result.

## 2021-11-17 ENCOUNTER — HOSPITAL ENCOUNTER
Dept: HOSPITAL 49 - FER | Age: 86
LOS: 2 days | Discharge: HOME HEALTH | End: 2021-11-19
Attending: INTERNAL MEDICINE | Admitting: INTERNAL MEDICINE
Payer: MEDICARE

## 2021-11-17 VITALS — BODY MASS INDEX: 26.18 KG/M2 | HEIGHT: 70.98 IN | WEIGHT: 187 LBS

## 2021-11-17 DIAGNOSIS — Z88.0: ICD-10-CM

## 2021-11-17 DIAGNOSIS — Z88.2: ICD-10-CM

## 2021-11-17 DIAGNOSIS — I13.0: Primary | ICD-10-CM

## 2021-11-17 DIAGNOSIS — E78.5: ICD-10-CM

## 2021-11-17 DIAGNOSIS — Z79.899: ICD-10-CM

## 2021-11-17 DIAGNOSIS — N18.30: ICD-10-CM

## 2021-11-17 DIAGNOSIS — I25.10: ICD-10-CM

## 2021-11-17 DIAGNOSIS — Z79.82: ICD-10-CM

## 2021-11-17 DIAGNOSIS — Z20.822: ICD-10-CM

## 2021-11-17 DIAGNOSIS — F03.90: ICD-10-CM

## 2021-11-17 DIAGNOSIS — I50.33: ICD-10-CM

## 2021-11-17 DIAGNOSIS — Z79.02: ICD-10-CM

## 2021-11-17 LAB
BASOPHIL: 1.2 % (ref 0–2)
BUN SERPL-MCNC: 40 MG/DL (ref 7–18)
BUN/CREAT RATIO (CALC): 22 RATIO
CHLORIDE: 105 MMOL/L (ref 98–107)
CO2 (BICARBONATE): 34 MMOL/L (ref 21–32)
CREATININE: 1.82 MG/DL (ref 0.67–1.17)
EOSINOPHIL: 10.5 % (ref 0–7)
GLUCOSE SERPL-MCNC: 104 MG/DL (ref 74–106)
HCT: 39.3 % (ref 42–52)
HGB BLD-MCNC: 12.1 G/DL (ref 13.2–18)
LYMPHOCYTE: 13 % (ref 15–48)
MCH RBC QN AUTO: 30.3 PG (ref 25–31)
MCHC RBC AUTO-ENTMCNC: 30.8 G/DL (ref 32–36)
MCV: 98.5 FL (ref 78–100)
MONOCYTE: 12.4 % (ref 0–12)
MPV: 11 FL (ref 6–9.5)
NEUTROPHIL: 62.5 % (ref 41–80)
NRBC: 0
PLT: 165 K/UL (ref 150–400)
POTASSIUM: 5.2 MMOL/L (ref 3.5–5.1)
PRO-BNP: 473 PG/ML (ref ?–450)
RBC MORPHOLOGY: NORMAL
RBC: 3.99 M/UL (ref 4.7–6)
RDW: 14.6 % (ref 11.5–14)
WBC: 7.5 K/UL (ref 4–10.5)

## 2021-11-17 PROCEDURE — U0002 COVID-19 LAB TEST NON-CDC: HCPCS

## 2021-11-17 PROCEDURE — G0378 HOSPITAL OBSERVATION PER HR: HCPCS

## 2021-11-18 LAB
BASOPHIL: 0.9 % (ref 0–2)
BUN SERPL-MCNC: 33 MG/DL (ref 7–18)
BUN/CREAT RATIO (CALC): 20.9 RATIO
CHLORIDE: 106 MMOL/L (ref 98–107)
CO2 (BICARBONATE): 33 MMOL/L (ref 21–32)
CREATININE: 1.58 MG/DL (ref 0.67–1.17)
EOSINOPHIL: 8.8 % (ref 0–7)
GLUCOSE SERPL-MCNC: 111 MG/DL (ref 74–106)
HCT: 38 % (ref 42–52)
HGB BLD-MCNC: 11.9 G/DL (ref 13.2–18)
LYMPHOCYTE: 16.3 % (ref 15–48)
MCH RBC QN AUTO: 30.4 PG (ref 25–31)
MCHC RBC AUTO-ENTMCNC: 31.3 G/DL (ref 32–36)
MCV: 96.9 FL (ref 78–100)
MONOCYTE: 11.5 % (ref 0–12)
MPV: 10.3 FL (ref 6–9.5)
NEUTROPHIL: 62.1 % (ref 41–80)
NRBC: 0
PLT: 149 K/UL (ref 150–400)
POTASSIUM: 4.8 MMOL/L (ref 3.5–5.1)
RBC MORPHOLOGY: NORMAL
RBC: 3.92 M/UL (ref 4.7–6)
RDW: 14.5 % (ref 11.5–14)
WBC: 6.8 K/UL (ref 4–10.5)

## 2021-11-18 NOTE — TELEPHONE ENCOUNTER
Called Karyn to get a report.  Her dad is back in the hospital for fluid overload.  Diuresed and 2L pulled off in ER.  Printed notes from [facility] and placed on Dr. Allen desk to review.

## 2021-11-19 LAB
BASOPHIL: 1.2 % (ref 0–2)
BUN SERPL-MCNC: 32 MG/DL (ref 7–18)
BUN/CREAT RATIO (CALC): 20.6 RATIO
CHLORIDE: 107 MMOL/L (ref 98–107)
CO2 (BICARBONATE): 31 MMOL/L (ref 21–32)
CREATININE: 1.55 MG/DL (ref 0.67–1.17)
EOSINOPHIL: 9.1 % (ref 0–7)
GLUCOSE SERPL-MCNC: 96 MG/DL (ref 74–106)
HCT: 38.4 % (ref 42–52)
HGB BLD-MCNC: 11.9 G/DL (ref 13.2–18)
LYMPHOCYTE: 18.1 % (ref 15–48)
MCH RBC QN AUTO: 30.1 PG (ref 25–31)
MCHC RBC AUTO-ENTMCNC: 31 G/DL (ref 32–36)
MCV: 97.2 FL (ref 78–100)
MONOCYTE: 13 % (ref 0–12)
MPV: 10.4 FL (ref 6–9.5)
NEUTROPHIL: 58.1 % (ref 41–80)
NRBC: 0
PLT: 164 K/UL (ref 150–400)
POTASSIUM: 4.7 MMOL/L (ref 3.5–5.1)
RBC MORPHOLOGY: NORMAL
RBC: 3.95 M/UL (ref 4.7–6)
RDW: 14.7 % (ref 11.5–14)
WBC: 6.5 K/UL (ref 4–10.5)

## 2021-11-19 NOTE — OUTREACH NOTE
Ambulatory Case Management Note    Called Karyn to follow up regarding her dad.  He was discharged today she thinks with Plavix, Multivitamin and Tramadol should be every 12hours.  They have a scheduled follow up on November 29 with Dr. Allen and cardiology afterward in December.  Pulling Records from [facility]. Will forward to cardiology.      Naomy Brunson RN  Ambulatory Case Management    11/19/2021, 15:51 EST

## 2021-11-19 NOTE — OUTREACH NOTE
Prep Survey      Responses   [facility] facility patient discharged from? Non-BH   Is LACE score < 7 ? Non-BH Discharge   Emergency Room discharge w/ pulse ox? No   Eligibility [facility]    Date of Discharge 11/19/21   Discharge Disposition Home or Self Care   Discharge diagnosis Unavailable   Does the patient have one of the following disease processes/diagnoses(primary or secondary)? Other   Prep survey completed? Yes          Katherine Guerra RN

## 2021-11-22 NOTE — OUTREACH NOTE
Call Center TCM Note      Responses   [facility] patient discharged from? Non-   Does the patient have one of the following disease processes/diagnoses(primary or secondary)? Other   TCM attempt successful? No   Unsuccessful attempts Attempt 1  [called to home and CG was only present in home--requested call to anish Boss]          Martine Vanessa RN    11/22/2021, 17:01 EST

## 2021-11-22 NOTE — OUTREACH NOTE
Call Center TCM Note      Responses   [facility] patient discharged from? Non-   Does the patient have one of the following disease processes/diagnoses(primary or secondary)? Other   TCM attempt successful? Yes   Call start time 1717   Call end time 1719   Discharge diagnosis Unavailable   Person spoke with today (if not patient) and relationship Sheryl- care giver.    Is the patient taking all medications as directed (includes completed medication regime)? Yes   Medication comments Care giver reports Pt has all meds as ordered.    Does the patient have a primary care provider?  Yes   Does the patient have an appointment with their PCP within 7 days of discharge? Yes   Comments regarding PCP HOSP DC FU appt 11/29/21 @ 10:15 pm.    Has the patient kept scheduled appointments due by today? N/A   Psychosocial issues? No   Did the patient receive a copy of their discharge instructions? Yes   Nursing interventions Reviewed instructions with patient   What is the patient's perception of their health status since discharge? Improving   Is the patient/caregiver able to teach back the hierarchy of who to call/visit for symptoms/problems? PCP, Specialist, Home health nurse, Urgent Care, ED, 911 Yes   TCM call completed? Yes   Wrap up additional comments Care giver reports PT is doing well and has all meds as ordered.           Karyn Thornton RN    11/22/2021, 17:20 EST

## 2021-11-24 NOTE — TELEPHONE ENCOUNTER
Karyn, daughter called for refill on her dad's Bumex.  Explained that medication needs to be filled and managed by cardiology since they are the ones consulting on him during rounds and discharging him with new directions.  She will call [facility] office to request refill from them.  Pulled discharge summary and read to her that it appear that he is now on 0.5mg daily.    
Received a refill request for Gabapentin 300mg tid.  This was prescribed by the vascular doctor at [facility] but since that time they have switched to [facility].  Can discuss on Monday to see if Dr. Allen is agreeable to fill or needs to go to Dr. Gonzales to fill.  Needs UDS on Monday.    
BRBPR

## 2021-11-24 NOTE — OUTREACH NOTE
[facility] End of Month Documentation    This Chronic Medical Management Care Plan for lBaine Morales, 88 y.o. male, has been monitored and managed and a new plan of care implemented for the month of November.  A cumulative time of 82  minutes was spent on this patient record this month, including phone call with relative; electronic communication with primary care provider; electronic communication with pharmacist; chart review, Monitoring and managing care with assistance of daughter..    Regarding the patient's problems: has Acute pain of right shoulder; Trigger point of right shoulder region; Bilateral carotid artery stenosis; CKD (chronic kidney disease) stage 3, GFR 30-59 ml/min ([facility]); Arteriosclerosis of coronary artery; Alzheimer disease ([facility]); Hypertension; Hyperlipidemia; Abnormal weight loss; Angina pectoris ([facility]); Aortic aneurysm ([facility]); Aortic valve stenosis; Diverticulitis; Dyspnea; Echocardiogram abnormal; Edema; Gastric ulcer; Heart murmur; High prostate specific antigen (PSA); Type 2 diabetes mellitus ([facility]); Hyperkalemia; Kidney disease; Malignant neoplasm of skin; 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 ([facility]); Obesity (BMI 30-39.9); PAD (peripheral artery disease) ([facility]); Peripheral nerve disease; Pneumonia; Presence of aortocoronary bypass graft; Presence of prosthetic heart valve; Right carotid artery occlusion; Stenosis of carotid artery; Stenosis of left subclavian artery (HCC); Systolic murmur; Lung nodule; Acute renal failure superimposed on stage 4 chronic kidney disease (HCC); and Pressure injury of left buttock, stage 1 on their problem list., the following items were addressed: medications; transitions to medical care, with multiple hospital visits, medication have been changed multiple times. and any changes can be found within the plan section of the note.  A detailed listing of time spent for chronic care management is tracked within each outreach encounter.   Current medications include:  has a current medication list which includes the following prescription(s): amlodipine, aspirin, atorvastatin, bumetanide, clopidogrel, donepezil, escitalopram, gabapentin, memantine, metoprolol succinate xl, multivitamin with minerals, ondansetron odt, promethazine, quetiapine, and tramadol. and the patient is reported to be patient is compliant with medication protocol, Compliant,  Medications are reported to be effective.  Regarding these diagnoses, referrals were made to the following provider(s):  cardiology.  All notes on chart for PCP to review.    The patient was monitored remotely for pain; medications; weight; activity level; mood & behavior, Monitors and reports abonormalities and changes..    The patient's physical needs include:  needs assistance with ADLs; help taking medications as prescribed; physician referral, AG has round the clock caregivers that provide assistance..     The patient's mental support needs include:  continued support, supportive.  Loss of wife, depression.    The patient's cognitive support needs include:  continued support; household care; personal care; supervision, Reminders provided and support given.    The patient's psychosocial support needs include:  continued support, Primarily homebound, daily trips to [facility].    The patient's functional needs include: needs assistance for ADLs; physical healthcare; physician referral, some ADL's    The patient's environmental needs include:  n/a    Care Plan overall comments:  Monitoring and managing health and other care providers.    Refer to previous outreach notes for more information on the areas listed above.    Monthly Billing Diagnoses  (I10) Hypertension, unspecified type    (N18.30) Stage 3 chronic kidney disease, unspecified whether stage 3a or 3b CKD (HCC)    (G30.9,  F02.80) Alzheimer disease (HCC)    (E78.2) Mixed hyperlipidemia    Medications   · Medications have been reconciled    Care  Plan progress this month:      Recently Modified Care Plans Updates made since 10/24/2021 12:00 AM     Medication Regimen         Problem Priority Last Modified     MEDICATION ADHERENCE --  11/1/2021  9:59 AM by Naomy Brunson RN              Goal Recent Progress Last Modified     Consistently take medications as prescribed --  11/1/2021  9:59 AM by Naomy Brunson RN              Task Due Date Last Modified     Discuss barriers to medication adherence with patient --  11/1/2021  9:59 AM by Naomy Brunson RN        Educate patient on frequency and refill details of meds --  11/1/2021 10:06 AM by Naomy Brunson RN        Assist patients in setting up daily notes/alarms for meds. Consider pill box use --  11/1/2021  9:59 AM by Naomy Brunson RN              Problem Priority Last Modified     MEDICATION CLARIFICATION --  11/4/2021 12:20 PM by Naomy Brunson RN              Goal Recent Progress Last Modified     Establish routine to inform medication changes to pharmacy --  11/4/2021 12:29 PM by Naomy Brunson RN              Task Due Date Last Modified     Provide parmacy information with all caregivers --  11/4/2021 12:29 PM by Naomy Brunson RN        Discuss importance of reporting any medication changes to pharmacy with prepackaging --  11/4/2021 12:30 PM by Naomy Brunson RN        Provide phone number for CCM nurse to inform of any changes. --  11/4/2021 12:30 PM by Naomy Brunson RN                      Instructions   · Patient was provided an electronic copy of care plan  · CCM services were explained and offered and patient has accepted these services.  · Patient has given their written consent to receive CCM services and understands that this includes the authorization of electronic communication of medical information with the other treating providers.  · Patient understands that they may stop CCM services at any time and these changes will be effective at the end of the calendar month and will  effectively revocate the agreement of CCM services.  · Patient understands that only one practitioner can furnish and be paid for CCM services during one calendar month.  Patient also understands that there may be co-payment or deductible fees in association with CCM services.  · Patient will continue with at least monthly follow-up calls with the Nurse Navigator.    Naomy Brunson RN  Ambulatory Case Management    11/24/2021, 11:26 EST

## 2021-11-29 PROBLEM — I50.9 CHF (CONGESTIVE HEART FAILURE) (HCC): Status: ACTIVE | Noted: 2021-01-01

## 2021-11-29 PROBLEM — N17.9 ACUTE RENAL FAILURE SUPERIMPOSED ON STAGE 4 CHRONIC KIDNEY DISEASE (HCC): Status: RESOLVED | Noted: 2021-01-01 | Resolved: 2021-01-01

## 2021-11-29 PROBLEM — Z87.891 HISTORY OF SMOKING: Status: ACTIVE | Noted: 2021-01-01

## 2021-11-29 PROBLEM — N18.4 ACUTE RENAL FAILURE SUPERIMPOSED ON STAGE 4 CHRONIC KIDNEY DISEASE: Status: RESOLVED | Noted: 2021-01-01 | Resolved: 2021-01-01

## 2021-11-29 PROBLEM — C44.90 MALIGNANT NEOPLASM OF SKIN: Status: RESOLVED | Noted: 2021-09-08 | Resolved: 2021-01-01

## 2021-11-29 NOTE — ASSESSMENT & PLAN NOTE
Blood pressure is slightly elevated.  I will hold off and see what cardiology says about possibly adding a low-dose ACE inhibitor

## 2021-11-29 NOTE — ASSESSMENT & PLAN NOTE
Does have the previously discovered lung nodule and has a repeat CT scan scheduled for early next year

## 2021-11-29 NOTE — PROGRESS NOTES
Chief Complaint  Hospital Follow Up Visit ([facility] inpatient - CHF )    Subjective     {Problem List  Visit Diagnosis   Encounters  Notes  Medications  Labs  Result Review Imaging  Media :23}     Blaine Morales presents to [facility]  History of Present Illness   Here with his daughter, Karyn.  Here to follow-up on recent hospitalization.  He was contacted by the readmission management nurse within 24 hours.  I reviewed copies of his H&P cardiology consult and laboratory values from the hospitalization.  Discharge summary is still pending.  Patient has had Bumex increased to 1 mg daily at discharge.  His daughter reports that he gets quick and vigorous response to the medication.  He does not have orthopnea or PND.  Does continue to have lower extremity edema and wears compression stockings.  They are monitoring his weight daily.  Continues to have in home caregivers.  He has a follow-up appointment with cardiology today.  Noted on my exam he has a few crackles in the bases.  He still has some edema around the ankles.  Blood pressure is slightly elevated.  I think it be reasonable to add an ACE inhibitor but I will defer to cardiology.  He did have an echocardiogram in January which showed preserved ejection fraction, but that may have been prior to his MI..    Current Outpatient Medications   Medication Sig Dispense Refill   • amLODIPine (NORVASC) 2.5 MG tablet Take 1 tablet by mouth Every Night. 28 tablet 2   • aspirin (aspirin) 81 MG EC tablet Take 1 tablet by mouth Daily. 28 tablet 2   • atorvastatin (LIPITOR) 80 MG tablet Take 80 mg by mouth Daily.     • bumetanide (BUMEX) 1 MG tablet Take 1 tablet by mouth Daily.     • clopidogrel (PLAVIX) 75 MG tablet Take 75 mg by mouth Daily.     • donepezil (ARICEPT) 10 MG tablet Take 1 tablet by mouth Daily. 90 tablet 0   • escitalopram (LEXAPRO) 5 MG tablet Take 1 tablet by mouth Daily. 90 tablet 0   • gabapentin (NEURONTIN) 300  "MG capsule Take 300 mg by mouth 3 (Three) Times a Day.     • memantine (NAMENDA) 10 MG tablet Take 1 tablet by mouth 2 (Two) Times a Day. 180 tablet 0   • metoprolol succinate XL (TOPROL-XL) 25 MG 24 hr tablet Take 25 mg by mouth Daily.     • multivitamin with minerals tablet tablet Take 1 tablet by mouth Daily. 100 tablet 0   • QUEtiapine (SEROquel) 25 MG tablet Take 0.5 tablets by mouth Every Night.     • traMADol (ULTRAM) 50 MG tablet TAKE ONE TABLET BY MOUTH EVERY 6 HOURS AS NEEDED FOR MODERATE TO SEVERE PAIN 30 tablet 0   • ondansetron ODT (ZOFRAN-ODT) 4 MG disintegrating tablet Place 4 mg on the tongue Every 8 (Eight) Hours As Needed.     • promethazine (PHENERGAN) 12.5 MG tablet Take 12.5 mg by mouth Every 6 (Six) Hours As Needed.       No current facility-administered medications for this visit.       Review of Systems   Constitutional: Negative for chills, fatigue and fever.   Respiratory: Negative for chest tightness, shortness of breath and wheezing.    Cardiovascular: Negative for chest pain and palpitations.   Gastrointestinal: Negative for constipation, diarrhea, nausea and vomiting.   Genitourinary: Negative for dysuria, hematuria and urgency.   Neurological: Negative for weakness and headache.   Psychiatric/Behavioral: Negative for hallucinations.            Objective   Vital Signs:   /75 (BP Location: Left arm, Patient Position: Sitting, Cuff Size: Large Adult)   Pulse 69   Temp 97.7 °F (36.5 °C) (Oral)   Ht 182.9 cm (72\")   Wt 87.1 kg (192 lb)   SpO2 91%   BMI 26.04 kg/m²     Physical Exam  Constitutional:       Appearance: Normal appearance.   Neck:      Vascular: No carotid bruit.   Cardiovascular:      Rate and Rhythm: Normal rate and regular rhythm.      Heart sounds: Normal heart sounds. No murmur heard.      Pulmonary:      Effort: No respiratory distress.      Breath sounds: No wheezing.      Comments: He does have some bibasilar crackles  Musculoskeletal:      Right lower leg: " Edema (1+) present.      Left lower leg: Edema (1+) present.      Comments: He is wearing compression stockings   Lymphadenopathy:      Cervical: No cervical adenopathy.   Neurological:      General: No focal deficit present.      Mental Status: He is alert.   Psychiatric:         Mood and Affect: Mood normal.         Speech: Speech normal.            Result Review :                     Assessment and Plan    Diagnoses and all orders for this visit:    1. Congestive heart failure, unspecified HF chronicity, unspecified heart failure type (HCC) (Primary)  Assessment & Plan:  He actually seems pretty well compensated at present.  Does continue to have a little bit of ankle edema and some bibasilar crackles.  No JVD or HJR.  He might benefit from addition of ACE inhibitor on his blood pressure is a little elevated today.  I will defer that decision to cardiology.    Orders:  -     BNP; Future    2. Alzheimer disease ([facility])  Assessment & Plan:  Continue with supervised supportive care.  Staff are responsible for giving him his medication.      3. Primary hypertension  Assessment & Plan:  Blood pressure is slightly elevated.  I will hold off and see what cardiology says about possibly adding a low-dose ACE inhibitor    Orders:  -     CBC & Differential; Future    4. Mixed hyperlipidemia  Assessment & Plan:  I will have him place an order for labs to be done.  Placement is future orders and if cardiology wants to have particular blood work it can all be drawn at same time    Orders:  -     Comprehensive Metabolic Panel; Future  -     Lipid Panel; Future    5. Stage 3 chronic kidney disease, unspecified whether stage 3a or 3b CKD ([facility])  Assessment & Plan:  We will get follow-up lab see how he is tolerating the increased dose of Bumex      6. PAD (peripheral artery disease) ([facility])  Assessment & Plan:  Continue to follow with vascular surgery      7. Type 2 diabetes mellitus with other circulatory complication, without  long-term current use of insulin (HCC)  -     Lipid Panel; Future  -     Hemoglobin A1c; Future    8. Lung nodule  Assessment & Plan:  Does have the previously discovered lung nodule and has a repeat CT scan scheduled for early next year      9. History of smoking      Follow Up   No follow-ups on file.  Patient was given instructions and counseling regarding his condition or for health maintenance advice. Please see specific information pulled into the AVS if appropriate.

## 2021-11-29 NOTE — ASSESSMENT & PLAN NOTE
I will have him place an order for labs to be done.  Placement is future orders and if cardiology wants to have particular blood work it can all be drawn at same time

## 2021-11-29 NOTE — ASSESSMENT & PLAN NOTE
He actually seems pretty well compensated at present.  Does continue to have a little bit of ankle edema and some bibasilar crackles.  No JVD or HJR.  He might benefit from addition of ACE inhibitor on his blood pressure is a little elevated today.  I will defer that decision to cardiology.

## 2021-12-10 NOTE — TELEPHONE ENCOUNTER
Karyn called to report that the place on his bottom (pressure ulcer), he is scratching it at night and bleeding.  Using Desitin currently.  Going to use an extra large bandage over the weekend and will call home health to see if they can assess. ([facility]).  Called Amber Pagan and she is seeing him next week.  Saw Hernandez Spicer and on Bumex 2mg daily.

## 2021-12-16 NOTE — TELEPHONE ENCOUNTER
Lets find out how he is actually taking this.  If he got 90 tablets on October 25 then he is not taking it 3 times a day.  Also see if you can find out if the medication has been deemed helpful.  If not we might start weaning him off    mas

## 2021-12-17 NOTE — TELEPHONE ENCOUNTER
This prescription was originally from [facility] doctor and from there they switched to Dr. Claudia Gonzales - who has not been asked to fill this Rx.    He is only taking this in the am - 1 time per day.    Does it help? - chema reports that she thinks so.    Are you willing to fill, no drug screen on file or want me to punt this back to vascular ( Dr. Gonzales) - directions would need to be changed to QD also.

## 2021-12-17 NOTE — PROGRESS NOTES
The December 9 labs suffice.  Lets plan on repeating lab work just prior to his follow-up appointment with me in February.    mas

## 2021-12-17 NOTE — TELEPHONE ENCOUNTER
I suggest we stop it. It might help him be less sleepy.     If he starts reporting more pain then we can start it back.     mas

## 2021-12-17 NOTE — OUTREACH NOTE
[facility] Interim Update    Discussed with Karyn that I had a 1 month follow up for labs to monitor renal function.  She states that he did have labs ( see 12/9 labs).  Any follow up from these labs?  Hernandez Spicer does have him on bumex 2mg daily according to his note on 12/10    Ambulatory Case Management Note      Naomy Brunson RN  Ambulatory Case Management    12/17/2021, 12:35 EST

## 2021-12-20 NOTE — TELEPHONE ENCOUNTER
Karyn called back, she admits that she is not there in the am when he gets up, but that the caregivers are reporting that he is in a lot of pain in the am until he takes his gabapentin.  Requesting that it be continued.

## 2021-12-20 NOTE — TELEPHONE ENCOUNTER
Lets give him a while off of the medication as typically this is not a type of medication that gives immediate relief after taking it.    mas

## 2021-12-22 NOTE — TELEPHONE ENCOUNTER
Karyn called back, she is really requesting the medication, she is not trying to push this but she is worried about the holiday's and she just wants him to be comfortable and worried that he will be in pain over the holidays.  She states that this is probably going to be his last joi and wants him to be comfortable and them to have a nice time together.  She is really worried with the absence of this medication, he will suffer.

## 2021-12-22 NOTE — TELEPHONE ENCOUNTER
Fine.  I can send in a couple of weeks worth of the medication to get him through the holidays and then we can stop it afterwards

## 2021-12-27 NOTE — OUTREACH NOTE
[facility] End of Month Documentation    This Chronic Medical Management Care Plan for Blaine Morales, 88 y.o. male, has been monitored and managed and a new plan of care implemented for the month of December.  A cumulative time of 38  minutes was spent on this patient record this month, including phone call with relative; electronic communication with primary care provider; electronic communication with pharmacist; chart review, Monitoring and managing care with assistance of daughter..    Regarding the patient's problems: has Acute pain of right shoulder; Trigger point of right shoulder region; Bilateral carotid artery stenosis; CKD (chronic kidney disease) stage 3, GFR 30-59 ml/min ([facility]); Arteriosclerosis of coronary artery; Alzheimer disease ([facility]); Hypertension; Hyperlipidemia; Abnormal weight loss; Angina pectoris ([facility]); Aortic aneurysm ([facility]); Aortic valve stenosis; Diverticulitis; Dyspnea; Echocardiogram abnormal; Edema; Gastric ulcer; Heart murmur; High prostate specific antigen (PSA); Type 2 diabetes mellitus ([facility]); Hyperkalemia; 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 ([facility]); Obesity (BMI 30-39.9); PAD (peripheral artery disease) ([facility]); Peripheral nerve disease; Pneumonia; Presence of aortocoronary bypass graft; Presence of prosthetic heart valve; Right carotid artery occlusion; Stenosis of carotid artery; Stenosis of left subclavian artery ([facility]); Systolic murmur; Lung nodule; Pressure injury of left buttock, stage 1; History of smoking; and CHF (congestive heart failure) ([facility]) on their problem list., the following items were addressed: medications; transitions to medical care, with multiple hospital visits, medication have been changed multiple times. and any changes can be found within the plan section of the note.  A detailed listing of time spent for chronic care management is tracked within each outreach encounter.  Current medications include:  has a current medication list  which includes the following prescription(s): amlodipine, aspirin, atorvastatin, bumetanide, clopidogrel, donepezil, escitalopram, gabapentin, memantine, metoprolol succinate xl, multivitamin with minerals, ondansetron odt, promethazine, quetiapine, and tramadol. and the patient is reported to be patient is compliant with medication protocol, Compliant,  Medications are reported to be effective, medication has been helping the pvd..  Regarding these diagnoses, referrals were made to the following provider(s):  specialists.  All notes on chart for PCP to review.    The patient was monitored remotely for pain; medications; weight; activity level; mood & behavior, Monitors and reports abonormalities and changes..    The patient's physical needs include:  needs assistance with ADLs; help taking medications as prescribed; physician referral,  has round the clock caregivers that provide assistance..     The patient's mental support needs include:  continued support, supportive.  Loss of wife, depression.    The patient's cognitive support needs include:  continued support; household care; personal care; supervision, Reminders provided and support given.    The patient's psychosocial support needs include:  continued support, Primarily homebound, daily trips to [facility].    The patient's functional needs include: needs assistance for ADLs; physical healthcare; physician referral, some ADL's    The patient's environmental needs include:  n/a    Care Plan overall comments:  Monitoring and managing health and other care providers.    Refer to previous outreach notes for more information on the areas listed above.    Monthly Billing Diagnoses  (N18.32) Stage 3b chronic kidney disease (HCC)    (I50.9) Chronic congestive heart failure, unspecified heart failure type (HCC)    (G30.9,  F02.80) Alzheimer disease (HCC)    Medications   · Medications have been reconciled    Care Plan progress this month:      Recently Modified  Care Plans Updates made since 11/26/2021 12:00 AM    No recently modified care plans.          Instructions   · Patient was provided an electronic copy of care plan  · CCM services were explained and offered and patient has accepted these services.  · Patient has given their written consent to receive CCM services and understands that this includes the authorization of electronic communication of medical information with the other treating providers.  · Patient understands that they may stop CCM services at any time and these changes will be effective at the end of the calendar month and will effectively revocate the agreement of CCM services.  · Patient understands that only one practitioner can furnish and be paid for CCM services during one calendar month.  Patient also understands that there may be co-payment or deductible fees in association with CCM services.  · Patient will continue with at least monthly follow-up calls with the Nurse Navigator.    Naomy Brunson RN  Ambulatory Case Management    12/27/2021, 13:58 EST

## 2021-12-29 NOTE — TELEPHONE ENCOUNTER
Caller: Karyn Kitchen    Relationship: Emergency Contact    Best call back number: [phone]    What medication are you requesting: ANTIBIOTICS    What are your current symptoms: COUGH, CONGESTION    How long have you been experiencing symptoms: A WEEK    Have you had these symptoms before:    [] Yes  [x] No    Have you been treated for these symptoms before:   [] Yes  [x] No    If a prescription is needed, what is your preferred pharmacy and phone number:    [facility] - [place], [place] - [address] [facility] [phone] [facility] [phone]   [phone]

## 2021-12-30 NOTE — TELEPHONE ENCOUNTER
Spoke with Karyn, seems to be better today.  Using Mucinex.  No covid symptoms.  She will seek care at Urgent care if she feels like he needs more attention.

## 2022-01-01 ENCOUNTER — OFFICE VISIT (OUTPATIENT)
Dept: PULMONOLOGY | Facility: CLINIC | Age: 87
End: 2022-01-01

## 2022-01-01 ENCOUNTER — TELEPHONE (OUTPATIENT)
Dept: CASE MANAGEMENT | Facility: OTHER | Age: 87
End: 2022-01-01

## 2022-01-01 ENCOUNTER — PATIENT OUTREACH (OUTPATIENT)
Dept: CASE MANAGEMENT | Facility: OTHER | Age: 87
End: 2022-01-01

## 2022-01-01 ENCOUNTER — HOSPITAL ENCOUNTER (OUTPATIENT)
Dept: CT IMAGING | Facility: HOSPITAL | Age: 87
Discharge: HOME | End: 2022-04-22
Admitting: FAMILY MEDICINE

## 2022-01-01 ENCOUNTER — READMISSION MANAGEMENT (OUTPATIENT)
Dept: CALL CENTER | Facility: HOSPITAL | Age: 87
End: 2022-01-01

## 2022-01-01 ENCOUNTER — PRIOR AUTHORIZATION (OUTPATIENT)
Dept: FAMILY MEDICINE CLINIC | Age: 87
End: 2022-01-01

## 2022-01-01 ENCOUNTER — OFFICE VISIT (OUTPATIENT)
Dept: FAMILY MEDICINE CLINIC | Age: 87
End: 2022-01-01

## 2022-01-01 ENCOUNTER — HOSPITAL ENCOUNTER (INPATIENT)
Facility: HOSPITAL | Age: 87
LOS: 2 days | End: 2022-10-01
Attending: EMERGENCY MEDICINE | Admitting: INTERNAL MEDICINE

## 2022-01-01 ENCOUNTER — LAB (OUTPATIENT)
Dept: LAB | Facility: HOSPITAL | Age: 87
End: 2022-01-01

## 2022-01-01 ENCOUNTER — APPOINTMENT (OUTPATIENT)
Dept: CT IMAGING | Facility: HOSPITAL | Age: 87
End: 2022-01-01

## 2022-01-01 ENCOUNTER — TRANSITIONAL CARE MANAGEMENT TELEPHONE ENCOUNTER (OUTPATIENT)
Dept: CALL CENTER | Facility: HOSPITAL | Age: 87
End: 2022-01-01

## 2022-01-01 ENCOUNTER — APPOINTMENT (OUTPATIENT)
Dept: GENERAL RADIOLOGY | Facility: HOSPITAL | Age: 87
End: 2022-01-01

## 2022-01-01 ENCOUNTER — HOSPITAL ENCOUNTER (OUTPATIENT)
Dept: GENERAL RADIOLOGY | Facility: HOSPITAL | Age: 87
Discharge: HOME | End: 2022-08-19

## 2022-01-01 VITALS
SYSTOLIC BLOOD PRESSURE: 136 MMHG | OXYGEN SATURATION: 95 % | TEMPERATURE: 97.4 F | WEIGHT: 188.4 LBS | HEIGHT: 71 IN | BODY MASS INDEX: 26.38 KG/M2 | HEART RATE: 70 BPM | DIASTOLIC BLOOD PRESSURE: 55 MMHG

## 2022-01-01 VITALS
WEIGHT: 183.4 LBS | HEART RATE: 68 BPM | DIASTOLIC BLOOD PRESSURE: 63 MMHG | SYSTOLIC BLOOD PRESSURE: 160 MMHG | HEIGHT: 71 IN | BODY MASS INDEX: 25.68 KG/M2

## 2022-01-01 VITALS
DIASTOLIC BLOOD PRESSURE: 82 MMHG | HEART RATE: 76 BPM | BODY MASS INDEX: 25.09 KG/M2 | WEIGHT: 179.2 LBS | OXYGEN SATURATION: 92 % | HEIGHT: 71 IN | TEMPERATURE: 97.5 F | SYSTOLIC BLOOD PRESSURE: 162 MMHG

## 2022-01-01 VITALS
HEIGHT: 71 IN | SYSTOLIC BLOOD PRESSURE: 154 MMHG | HEART RATE: 68 BPM | DIASTOLIC BLOOD PRESSURE: 60 MMHG | BODY MASS INDEX: 26.04 KG/M2 | RESPIRATION RATE: 14 BRPM | TEMPERATURE: 98 F | OXYGEN SATURATION: 94 % | WEIGHT: 186 LBS

## 2022-01-01 VITALS
OXYGEN SATURATION: 94 % | BODY MASS INDEX: 24.36 KG/M2 | HEIGHT: 71 IN | SYSTOLIC BLOOD PRESSURE: 128 MMHG | WEIGHT: 174 LBS | HEART RATE: 85 BPM | DIASTOLIC BLOOD PRESSURE: 65 MMHG | TEMPERATURE: 97.9 F

## 2022-01-01 VITALS
SYSTOLIC BLOOD PRESSURE: 152 MMHG | TEMPERATURE: 97.8 F | HEART RATE: 65 BPM | DIASTOLIC BLOOD PRESSURE: 78 MMHG | RESPIRATION RATE: 19 BRPM | HEIGHT: 71 IN | WEIGHT: 185 LBS | OXYGEN SATURATION: 91 % | BODY MASS INDEX: 25.9 KG/M2

## 2022-01-01 VITALS
OXYGEN SATURATION: 97 % | HEART RATE: 73 BPM | DIASTOLIC BLOOD PRESSURE: 71 MMHG | HEIGHT: 72 IN | SYSTOLIC BLOOD PRESSURE: 130 MMHG | BODY MASS INDEX: 25.33 KG/M2 | WEIGHT: 187 LBS | TEMPERATURE: 98.1 F

## 2022-01-01 VITALS
WEIGHT: 184.4 LBS | SYSTOLIC BLOOD PRESSURE: 146 MMHG | DIASTOLIC BLOOD PRESSURE: 72 MMHG | OXYGEN SATURATION: 92 % | BODY MASS INDEX: 25.81 KG/M2 | HEIGHT: 71 IN | HEART RATE: 84 BPM

## 2022-01-01 VITALS
DIASTOLIC BLOOD PRESSURE: 56 MMHG | BODY MASS INDEX: 26.47 KG/M2 | WEIGHT: 184.9 LBS | HEIGHT: 70 IN | TEMPERATURE: 100.4 F | SYSTOLIC BLOOD PRESSURE: 97 MMHG

## 2022-01-01 VITALS
DIASTOLIC BLOOD PRESSURE: 62 MMHG | HEART RATE: 53 BPM | SYSTOLIC BLOOD PRESSURE: 160 MMHG | OXYGEN SATURATION: 97 % | HEIGHT: 71 IN | BODY MASS INDEX: 25.48 KG/M2 | TEMPERATURE: 98.6 F | WEIGHT: 182 LBS

## 2022-01-01 DIAGNOSIS — I10 PRIMARY HYPERTENSION: ICD-10-CM

## 2022-01-01 DIAGNOSIS — G30.9 ALZHEIMER DISEASE: ICD-10-CM

## 2022-01-01 DIAGNOSIS — I73.9 PAD (PERIPHERAL ARTERY DISEASE): Primary | ICD-10-CM

## 2022-01-01 DIAGNOSIS — I50.9 CHRONIC CONGESTIVE HEART FAILURE, UNSPECIFIED HEART FAILURE TYPE: Primary | ICD-10-CM

## 2022-01-01 DIAGNOSIS — M79.604 PAIN IN BOTH LOWER EXTREMITIES: ICD-10-CM

## 2022-01-01 DIAGNOSIS — E78.2 MIXED HYPERLIPIDEMIA: ICD-10-CM

## 2022-01-01 DIAGNOSIS — F02.80 ALZHEIMER DISEASE: ICD-10-CM

## 2022-01-01 DIAGNOSIS — G30.9 ALZHEIMER DISEASE: Primary | ICD-10-CM

## 2022-01-01 DIAGNOSIS — R05.9 COUGH: ICD-10-CM

## 2022-01-01 DIAGNOSIS — R59.1 LYMPHADENOPATHY: ICD-10-CM

## 2022-01-01 DIAGNOSIS — R93.89 ABNORMAL CT OF THE CHEST: Primary | ICD-10-CM

## 2022-01-01 DIAGNOSIS — I73.9 PAD (PERIPHERAL ARTERY DISEASE): ICD-10-CM

## 2022-01-01 DIAGNOSIS — F02.80 ALZHEIMER DISEASE: Primary | ICD-10-CM

## 2022-01-01 DIAGNOSIS — M79.605 PAIN IN BOTH LOWER EXTREMITIES: ICD-10-CM

## 2022-01-01 DIAGNOSIS — N18.32 STAGE 3B CHRONIC KIDNEY DISEASE: ICD-10-CM

## 2022-01-01 DIAGNOSIS — R07.89 OTHER CHEST PAIN: ICD-10-CM

## 2022-01-01 DIAGNOSIS — R10.30 LOWER ABDOMINAL PAIN: ICD-10-CM

## 2022-01-01 DIAGNOSIS — R10.32 LEFT LOWER QUADRANT ABDOMINAL PAIN: Primary | ICD-10-CM

## 2022-01-01 DIAGNOSIS — I10 HYPERTENSION, UNSPECIFIED TYPE: ICD-10-CM

## 2022-01-01 DIAGNOSIS — G89.29 CHRONIC NONINTRACTABLE HEADACHE, UNSPECIFIED HEADACHE TYPE: Primary | ICD-10-CM

## 2022-01-01 DIAGNOSIS — I50.42 CHRONIC COMBINED SYSTOLIC AND DIASTOLIC CONGESTIVE HEART FAILURE: ICD-10-CM

## 2022-01-01 DIAGNOSIS — J01.00 ACUTE NON-RECURRENT MAXILLARY SINUSITIS: Primary | ICD-10-CM

## 2022-01-01 DIAGNOSIS — R93.89 ABNORMAL CT OF THE CHEST: ICD-10-CM

## 2022-01-01 DIAGNOSIS — Z23 NEED FOR VACCINATION: ICD-10-CM

## 2022-01-01 DIAGNOSIS — E11.59 TYPE 2 DIABETES MELLITUS WITH OTHER CIRCULATORY COMPLICATION, WITHOUT LONG-TERM CURRENT USE OF INSULIN: ICD-10-CM

## 2022-01-01 DIAGNOSIS — R94.2 ABNORMAL PET SCAN OF LUNG: ICD-10-CM

## 2022-01-01 DIAGNOSIS — J96.01 ACUTE RESPIRATORY FAILURE WITH HYPOXIA AND HYPERCAPNIA: Primary | ICD-10-CM

## 2022-01-01 DIAGNOSIS — L60.2 NAIL HYPERTROPHY: ICD-10-CM

## 2022-01-01 DIAGNOSIS — R91.1 LUNG NODULE: ICD-10-CM

## 2022-01-01 DIAGNOSIS — Z79.899 LONG-TERM USE OF HIGH-RISK MEDICATION: ICD-10-CM

## 2022-01-01 DIAGNOSIS — J96.02 ACUTE RESPIRATORY FAILURE WITH HYPOXIA AND HYPERCAPNIA: Primary | ICD-10-CM

## 2022-01-01 DIAGNOSIS — R06.09 DYSPNEA ON EXERTION: ICD-10-CM

## 2022-01-01 DIAGNOSIS — I71.9 AORTIC ANEURYSM WITHOUT RUPTURE, UNSPECIFIED PORTION OF AORTA: ICD-10-CM

## 2022-01-01 DIAGNOSIS — N18.32 STAGE 3B CHRONIC KIDNEY DISEASE: Primary | ICD-10-CM

## 2022-01-01 DIAGNOSIS — I50.9 CHRONIC CONGESTIVE HEART FAILURE, UNSPECIFIED HEART FAILURE TYPE: ICD-10-CM

## 2022-01-01 DIAGNOSIS — R51.9 CHRONIC NONINTRACTABLE HEADACHE, UNSPECIFIED HEADACHE TYPE: Primary | ICD-10-CM

## 2022-01-01 DIAGNOSIS — J18.9 PNEUMONIA DUE TO INFECTIOUS ORGANISM, UNSPECIFIED LATERALITY, UNSPECIFIED PART OF LUNG: Primary | ICD-10-CM

## 2022-01-01 DIAGNOSIS — F17.211 NICOTINE DEPENDENCE, CIGARETTES, IN REMISSION: ICD-10-CM

## 2022-01-01 DIAGNOSIS — R91.1 LUNG NODULE: Primary | ICD-10-CM

## 2022-01-01 DIAGNOSIS — R91.8 MASS OF LOWER LOBE OF RIGHT LUNG: ICD-10-CM

## 2022-01-01 DIAGNOSIS — R07.89 OTHER CHEST PAIN: Primary | ICD-10-CM

## 2022-01-01 DIAGNOSIS — Z95.2 PRESENCE OF PROSTHETIC HEART VALVE: ICD-10-CM

## 2022-01-01 DIAGNOSIS — R11.0 NAUSEA: ICD-10-CM

## 2022-01-01 DIAGNOSIS — I70.221 ATHEROSCLEROSIS OF NATIVE ARTERY OF RIGHT LOWER EXTREMITY WITH REST PAIN: ICD-10-CM

## 2022-01-01 DIAGNOSIS — K59.03 DRUG-INDUCED CONSTIPATION: ICD-10-CM

## 2022-01-01 DIAGNOSIS — C79.9 METASTATIC MALIGNANT NEOPLASM, UNSPECIFIED SITE: ICD-10-CM

## 2022-01-01 LAB
ALBUMIN SERPL-MCNC: 3.3 G/DL (ref 3.5–5.2)
ALBUMIN SERPL-MCNC: 3.6 G/DL (ref 3.5–5.2)
ALBUMIN SERPL-MCNC: 4 G/DL (ref 3.5–5.2)
ALBUMIN/GLOB SERPL: 1.1 G/DL
ALBUMIN/GLOB SERPL: 1.2 G/DL
ALBUMIN/GLOB SERPL: 1.4 G/DL
ALP SERPL-CCNC: 81 U/L (ref 39–117)
ALP SERPL-CCNC: 82 U/L (ref 39–117)
ALP SERPL-CCNC: 89 U/L (ref 39–117)
ALT SERPL W P-5'-P-CCNC: 14 U/L (ref 1–41)
ALT SERPL W P-5'-P-CCNC: 17 U/L (ref 1–41)
ALT SERPL W P-5'-P-CCNC: 18 U/L (ref 1–41)
AMPHET+METHAMPHET UR QL: NEGATIVE
AMPHETAMINE INTERNAL CONTROL: NORMAL
AMPHETAMINES UR QL: NEGATIVE
ANION GAP SERPL CALCULATED.3IONS-SCNC: 11.3 MMOL/L (ref 5–15)
ANION GAP SERPL CALCULATED.3IONS-SCNC: 8.3 MMOL/L (ref 5–15)
ANION GAP SERPL CALCULATED.3IONS-SCNC: 9.6 MMOL/L (ref 5–15)
ARTERIAL PATENCY WRIST A: POSITIVE
AST SERPL-CCNC: 21 U/L (ref 1–40)
AST SERPL-CCNC: 23 U/L (ref 1–40)
AST SERPL-CCNC: 25 U/L (ref 1–40)
ATMOSPHERIC PRESS: 760.6 MMHG
B PARAPERT DNA SPEC QL NAA+PROBE: NOT DETECTED
B PERT DNA SPEC QL NAA+PROBE: NOT DETECTED
BARBITURATE INTERNAL CONTROL: NORMAL
BARBITURATES UR QL SCN: NEGATIVE
BASE EXCESS BLDA CALC-SCNC: 4 MMOL/L (ref 0–2)
BASOPHILS # BLD AUTO: 0.07 10*3/MM3 (ref 0–0.2)
BASOPHILS # BLD AUTO: 0.08 10*3/MM3 (ref 0–0.2)
BASOPHILS # BLD AUTO: 0.09 10*3/MM3 (ref 0–0.2)
BASOPHILS NFR BLD AUTO: 0.8 % (ref 0–1.5)
BASOPHILS NFR BLD AUTO: 0.9 % (ref 0–1.5)
BASOPHILS NFR BLD AUTO: 1 % (ref 0–1.5)
BDY SITE: ABNORMAL
BENZODIAZ UR QL SCN: NEGATIVE
BENZODIAZEPINE INTERNAL CONTROL: NORMAL
BILIRUB SERPL-MCNC: 0.2 MG/DL (ref 0–1.2)
BILIRUB SERPL-MCNC: 0.2 MG/DL (ref 0–1.2)
BILIRUB SERPL-MCNC: <0.2 MG/DL (ref 0–1.2)
BUN SERPL-MCNC: 23 MG/DL (ref 8–23)
BUN SERPL-MCNC: 29 MG/DL (ref 8–23)
BUN SERPL-MCNC: 31 MG/DL (ref 8–23)
BUN/CREAT SERPL: 16.7 (ref 7–25)
BUN/CREAT SERPL: 18.2 (ref 7–25)
BUN/CREAT SERPL: 20.7 (ref 7–25)
BUPRENORPHINE INTERNAL CONTROL: NORMAL
BUPRENORPHINE SERPL-MCNC: NEGATIVE NG/ML
C PNEUM DNA NPH QL NAA+NON-PROBE: NOT DETECTED
CALCIUM SPEC-SCNC: 8.8 MG/DL (ref 8.6–10.5)
CALCIUM SPEC-SCNC: 9.2 MG/DL (ref 8.6–10.5)
CALCIUM SPEC-SCNC: 9.3 MG/DL (ref 8.6–10.5)
CANNABINOIDS SERPL QL: NEGATIVE
CHLORIDE SERPL-SCNC: 103 MMOL/L (ref 98–107)
CHLORIDE SERPL-SCNC: 107 MMOL/L (ref 98–107)
CHLORIDE SERPL-SCNC: 107 MMOL/L (ref 98–107)
CHOLEST SERPL-MCNC: 153 MG/DL (ref 0–200)
CO2 SERPL-SCNC: 26.7 MMOL/L (ref 22–29)
CO2 SERPL-SCNC: 28.4 MMOL/L (ref 22–29)
CO2 SERPL-SCNC: 28.7 MMOL/L (ref 22–29)
COCAINE INTERNAL CONTROL: NORMAL
COCAINE UR QL: NEGATIVE
CREAT SERPL-MCNC: 1.38 MG/DL (ref 0.76–1.27)
CREAT SERPL-MCNC: 1.5 MG/DL (ref 0.76–1.27)
CREAT SERPL-MCNC: 1.59 MG/DL (ref 0.76–1.27)
D-LACTATE SERPL-SCNC: 1.6 MMOL/L (ref 0.5–2)
D-LACTATE SERPL-SCNC: 2.7 MMOL/L (ref 0.5–2)
DEPRECATED RDW RBC AUTO: 46 FL (ref 37–54)
DEPRECATED RDW RBC AUTO: 47.7 FL (ref 37–54)
DEPRECATED RDW RBC AUTO: 54.9 FL (ref 37–54)
EGFRCR SERPLBLD CKD-EPI 2021: 41.5 ML/MIN/1.73
EGFRCR SERPLBLD CKD-EPI 2021: 44.5 ML/MIN/1.73
EGFRCR SERPLBLD CKD-EPI 2021: 49.2 ML/MIN/1.73
EOSINOPHIL # BLD AUTO: 0.32 10*3/MM3 (ref 0–0.4)
EOSINOPHIL # BLD AUTO: 0.54 10*3/MM3 (ref 0–0.4)
EOSINOPHIL # BLD AUTO: 0.71 10*3/MM3 (ref 0–0.4)
EOSINOPHIL NFR BLD AUTO: 3.6 % (ref 0.3–6.2)
EOSINOPHIL NFR BLD AUTO: 6.1 % (ref 0.3–6.2)
EOSINOPHIL NFR BLD AUTO: 8.4 % (ref 0.3–6.2)
ERYTHROCYTE [DISTWIDTH] IN BLOOD BY AUTOMATED COUNT: 13.3 % (ref 12.3–15.4)
ERYTHROCYTE [DISTWIDTH] IN BLOOD BY AUTOMATED COUNT: 13.5 % (ref 12.3–15.4)
ERYTHROCYTE [DISTWIDTH] IN BLOOD BY AUTOMATED COUNT: 14.8 % (ref 12.3–15.4)
EXPIRATION DATE: NORMAL
FLUAV SUBTYP SPEC NAA+PROBE: NOT DETECTED
FLUBV RNA ISLT QL NAA+PROBE: NOT DETECTED
GLOBULIN UR ELPH-MCNC: 2.8 GM/DL
GLOBULIN UR ELPH-MCNC: 2.9 GM/DL
GLOBULIN UR ELPH-MCNC: 3 GM/DL
GLUCOSE SERPL-MCNC: 149 MG/DL (ref 65–99)
GLUCOSE SERPL-MCNC: 195 MG/DL (ref 65–99)
GLUCOSE SERPL-MCNC: 210 MG/DL (ref 65–99)
HADV DNA SPEC NAA+PROBE: NOT DETECTED
HBA1C MFR BLD: 7.2 % (ref 4.8–5.6)
HCO3 BLDA-SCNC: 33.4 MMOL/L (ref 22–28)
HCOV 229E RNA SPEC QL NAA+PROBE: NOT DETECTED
HCOV HKU1 RNA SPEC QL NAA+PROBE: NOT DETECTED
HCOV NL63 RNA SPEC QL NAA+PROBE: NOT DETECTED
HCOV OC43 RNA SPEC QL NAA+PROBE: NOT DETECTED
HCT VFR BLD AUTO: 32.2 % (ref 37.5–51)
HCT VFR BLD AUTO: 39.3 % (ref 37.5–51)
HCT VFR BLD AUTO: 42.3 % (ref 37.5–51)
HDLC SERPL-MCNC: 41 MG/DL (ref 40–60)
HGB BLD-MCNC: 10.6 G/DL (ref 13–17.7)
HGB BLD-MCNC: 12.2 G/DL (ref 13–17.7)
HGB BLD-MCNC: 13.3 G/DL (ref 13–17.7)
HMPV RNA NPH QL NAA+NON-PROBE: NOT DETECTED
HOLD SPECIMEN: NORMAL
HPIV1 RNA ISLT QL NAA+PROBE: NOT DETECTED
HPIV2 RNA SPEC QL NAA+PROBE: NOT DETECTED
HPIV3 RNA NPH QL NAA+PROBE: NOT DETECTED
HPIV4 P GENE NPH QL NAA+PROBE: NOT DETECTED
IMM GRANULOCYTES # BLD AUTO: 0.02 10*3/MM3 (ref 0–0.05)
IMM GRANULOCYTES # BLD AUTO: 0.03 10*3/MM3 (ref 0–0.05)
IMM GRANULOCYTES # BLD AUTO: 0.06 10*3/MM3 (ref 0–0.05)
IMM GRANULOCYTES NFR BLD AUTO: 0.2 % (ref 0–0.5)
IMM GRANULOCYTES NFR BLD AUTO: 0.3 % (ref 0–0.5)
IMM GRANULOCYTES NFR BLD AUTO: 0.7 % (ref 0–0.5)
INHALED O2 CONCENTRATION: 50 %
LDLC SERPL CALC-MCNC: 80 MG/DL (ref 0–100)
LDLC/HDLC SERPL: 1.8 {RATIO}
LYMPHOCYTES # BLD AUTO: 0.65 10*3/MM3 (ref 0.7–3.1)
LYMPHOCYTES # BLD AUTO: 0.78 10*3/MM3 (ref 0.7–3.1)
LYMPHOCYTES # BLD AUTO: 0.91 10*3/MM3 (ref 0.7–3.1)
LYMPHOCYTES NFR BLD AUTO: 10.3 % (ref 19.6–45.3)
LYMPHOCYTES NFR BLD AUTO: 7.4 % (ref 19.6–45.3)
LYMPHOCYTES NFR BLD AUTO: 9.2 % (ref 19.6–45.3)
Lab: NORMAL
M PNEUMO IGG SER IA-ACNC: NOT DETECTED
MCH RBC QN AUTO: 30.4 PG (ref 26.6–33)
MCH RBC QN AUTO: 30.5 PG (ref 26.6–33)
MCH RBC QN AUTO: 30.9 PG (ref 26.6–33)
MCHC RBC AUTO-ENTMCNC: 31 G/DL (ref 31.5–35.7)
MCHC RBC AUTO-ENTMCNC: 31.4 G/DL (ref 31.5–35.7)
MCHC RBC AUTO-ENTMCNC: 32.9 G/DL (ref 31.5–35.7)
MCV RBC AUTO: 92.8 FL (ref 79–97)
MCV RBC AUTO: 96.6 FL (ref 79–97)
MCV RBC AUTO: 99.5 FL (ref 79–97)
MDMA (ECSTASY) INTERNAL CONTROL: NORMAL
MDMA UR QL SCN: NEGATIVE
METHADONE INTERNAL CONTROL: NORMAL
METHADONE UR QL SCN: NEGATIVE
METHAMPHETAMINE INTERNAL CONTROL: NORMAL
MODALITY: ABNORMAL
MONOCYTES # BLD AUTO: 0.59 10*3/MM3 (ref 0.1–0.9)
MONOCYTES # BLD AUTO: 0.82 10*3/MM3 (ref 0.1–0.9)
MONOCYTES # BLD AUTO: 0.86 10*3/MM3 (ref 0.1–0.9)
MONOCYTES NFR BLD AUTO: 6.7 % (ref 5–12)
MONOCYTES NFR BLD AUTO: 9.7 % (ref 5–12)
MONOCYTES NFR BLD AUTO: 9.7 % (ref 5–12)
NEUTROPHILS NFR BLD AUTO: 6.08 10*3/MM3 (ref 1.7–7)
NEUTROPHILS NFR BLD AUTO: 6.43 10*3/MM3 (ref 1.7–7)
NEUTROPHILS NFR BLD AUTO: 7.15 10*3/MM3 (ref 1.7–7)
NEUTROPHILS NFR BLD AUTO: 71.6 % (ref 42.7–76)
NEUTROPHILS NFR BLD AUTO: 72.6 % (ref 42.7–76)
NEUTROPHILS NFR BLD AUTO: 80.8 % (ref 42.7–76)
NRBC BLD AUTO-RTO: 0 /100 WBC (ref 0–0.2)
NT-PROBNP SERPL-MCNC: 2851 PG/ML (ref 0–1800)
NT-PROBNP SERPL-MCNC: 502 PG/ML (ref 0–1800)
O2 A-A PPRESDIFF RESPIRATORY: 0.3 MMHG
OPIATES INTERNAL CONTROL: NORMAL
OPIATES UR QL: NEGATIVE
OXYCODONE INTERNAL CONTROL: NORMAL
OXYCODONE UR QL SCN: NEGATIVE
PCO2 BLDA: 78.5 MM HG (ref 35–45)
PCP UR QL SCN: NEGATIVE
PH BLDA: 7.24 PH UNITS (ref 7.35–7.45)
PHENCYCLIDINE INTERNAL CONTROL: NORMAL
PLATELET # BLD AUTO: 201 10*3/MM3 (ref 140–450)
PLATELET # BLD AUTO: 202 10*3/MM3 (ref 140–450)
PLATELET # BLD AUTO: 221 10*3/MM3 (ref 140–450)
PMV BLD AUTO: 10.2 FL (ref 6–12)
PMV BLD AUTO: 10.6 FL (ref 6–12)
PMV BLD AUTO: 11 FL (ref 6–12)
PO2 BLDA: 72.7 MM HG (ref 80–100)
POTASSIUM SERPL-SCNC: 4.3 MMOL/L (ref 3.5–5.2)
POTASSIUM SERPL-SCNC: 4.6 MMOL/L (ref 3.5–5.2)
POTASSIUM SERPL-SCNC: 4.7 MMOL/L (ref 3.5–5.2)
PROT SERPL-MCNC: 6.2 G/DL (ref 6–8.5)
PROT SERPL-MCNC: 6.6 G/DL (ref 6–8.5)
PROT SERPL-MCNC: 6.8 G/DL (ref 6–8.5)
QT INTERVAL: 440 MS
RBC # BLD AUTO: 3.47 10*6/MM3 (ref 4.14–5.8)
RBC # BLD AUTO: 3.95 10*6/MM3 (ref 4.14–5.8)
RBC # BLD AUTO: 4.38 10*6/MM3 (ref 4.14–5.8)
RHINOVIRUS RNA SPEC NAA+PROBE: NOT DETECTED
RSV RNA NPH QL NAA+NON-PROBE: NOT DETECTED
SAO2 % BLDCOA: 90.2 % (ref 92–99)
SARS-COV-2 RNA NPH QL NAA+NON-PROBE: NOT DETECTED
SODIUM SERPL-SCNC: 141 MMOL/L (ref 136–145)
SODIUM SERPL-SCNC: 144 MMOL/L (ref 136–145)
SODIUM SERPL-SCNC: 145 MMOL/L (ref 136–145)
THC INTERNAL CONTROL: NORMAL
TOTAL RATE: 24 BREATHS/MINUTE
TRIGL SERPL-MCNC: 192 MG/DL (ref 0–150)
TROPONIN T SERPL-MCNC: 0.02 NG/ML (ref 0–0.03)
VLDLC SERPL-MCNC: 32 MG/DL (ref 5–40)
WBC NRBC COR # BLD: 8.49 10*3/MM3 (ref 3.4–10.8)
WBC NRBC COR # BLD: 8.84 10*3/MM3 (ref 3.4–10.8)
WBC NRBC COR # BLD: 8.86 10*3/MM3 (ref 3.4–10.8)
WHOLE BLOOD HOLD COAG: NORMAL

## 2022-01-01 PROCEDURE — 80053 COMPREHEN METABOLIC PANEL: CPT | Performed by: FAMILY MEDICINE

## 2022-01-01 PROCEDURE — 87040 BLOOD CULTURE FOR BACTERIA: CPT | Performed by: EMERGENCY MEDICINE

## 2022-01-01 PROCEDURE — 99214 OFFICE O/P EST MOD 30 MIN: CPT | Performed by: FAMILY MEDICINE

## 2022-01-01 PROCEDURE — 83605 ASSAY OF LACTIC ACID: CPT | Performed by: EMERGENCY MEDICINE

## 2022-01-01 PROCEDURE — 36415 COLL VENOUS BLD VENIPUNCTURE: CPT | Performed by: EMERGENCY MEDICINE

## 2022-01-01 PROCEDURE — 1111F DSCHRG MED/CURRENT MED MERGE: CPT | Performed by: FAMILY MEDICINE

## 2022-01-01 PROCEDURE — 93000 ELECTROCARDIOGRAM COMPLETE: CPT | Performed by: NURSE PRACTITIONER

## 2022-01-01 PROCEDURE — 85025 COMPLETE CBC W/AUTO DIFF WBC: CPT | Performed by: EMERGENCY MEDICINE

## 2022-01-01 PROCEDURE — 0054A COVID-19 (PFIZER) 12+ YRS: CPT | Performed by: FAMILY MEDICINE

## 2022-01-01 PROCEDURE — 85025 COMPLETE CBC W/AUTO DIFF WBC: CPT | Performed by: FAMILY MEDICINE

## 2022-01-01 PROCEDURE — 80053 COMPREHEN METABOLIC PANEL: CPT

## 2022-01-01 PROCEDURE — 99490 CHRNC CARE MGMT STAFF 1ST 20: CPT | Performed by: FAMILY MEDICINE

## 2022-01-01 PROCEDURE — 91305 COVID-19 (PFIZER) 12+ YRS: CPT | Performed by: FAMILY MEDICINE

## 2022-01-01 PROCEDURE — 99495 TRANSJ CARE MGMT MOD F2F 14D: CPT | Performed by: FAMILY MEDICINE

## 2022-01-01 PROCEDURE — 99222 1ST HOSP IP/OBS MODERATE 55: CPT | Performed by: INTERNAL MEDICINE

## 2022-01-01 PROCEDURE — 80305 DRUG TEST PRSMV DIR OPT OBS: CPT | Performed by: FAMILY MEDICINE

## 2022-01-01 PROCEDURE — 84484 ASSAY OF TROPONIN QUANT: CPT | Performed by: EMERGENCY MEDICINE

## 2022-01-01 PROCEDURE — 83036 HEMOGLOBIN GLYCOSYLATED A1C: CPT

## 2022-01-01 PROCEDURE — 99439 CHRNC CARE MGMT STAF EA ADDL: CPT | Performed by: FAMILY MEDICINE

## 2022-01-01 PROCEDURE — 25010000002 LORAZEPAM PER 2 MG: Performed by: INTERNAL MEDICINE

## 2022-01-01 PROCEDURE — 99285 EMERGENCY DEPT VISIT HI MDM: CPT

## 2022-01-01 PROCEDURE — 36600 WITHDRAWAL OF ARTERIAL BLOOD: CPT

## 2022-01-01 PROCEDURE — 80053 COMPREHEN METABOLIC PANEL: CPT | Performed by: EMERGENCY MEDICINE

## 2022-01-01 PROCEDURE — 71046 X-RAY EXAM CHEST 2 VIEWS: CPT

## 2022-01-01 PROCEDURE — 99214 OFFICE O/P EST MOD 30 MIN: CPT | Performed by: NURSE PRACTITIONER

## 2022-01-01 PROCEDURE — G0439 PPPS, SUBSEQ VISIT: HCPCS | Performed by: FAMILY MEDICINE

## 2022-01-01 PROCEDURE — 99203 OFFICE O/P NEW LOW 30 MIN: CPT | Performed by: INTERNAL MEDICINE

## 2022-01-01 PROCEDURE — 93005 ELECTROCARDIOGRAM TRACING: CPT | Performed by: EMERGENCY MEDICINE

## 2022-01-01 PROCEDURE — 1170F FXNL STATUS ASSESSED: CPT | Performed by: FAMILY MEDICINE

## 2022-01-01 PROCEDURE — 36415 COLL VENOUS BLD VENIPUNCTURE: CPT

## 2022-01-01 PROCEDURE — 25010000002 MORPHINE PER 10 MG: Performed by: INTERNAL MEDICINE

## 2022-01-01 PROCEDURE — 36415 COLL VENOUS BLD VENIPUNCTURE: CPT | Performed by: FAMILY MEDICINE

## 2022-01-01 PROCEDURE — 83880 ASSAY OF NATRIURETIC PEPTIDE: CPT

## 2022-01-01 PROCEDURE — 93000 ELECTROCARDIOGRAM COMPLETE: CPT | Performed by: FAMILY MEDICINE

## 2022-01-01 PROCEDURE — 85025 COMPLETE CBC W/AUTO DIFF WBC: CPT

## 2022-01-01 PROCEDURE — 71250 CT THORAX DX C-: CPT

## 2022-01-01 PROCEDURE — 80061 LIPID PANEL: CPT

## 2022-01-01 PROCEDURE — 1159F MED LIST DOCD IN RCRD: CPT | Performed by: FAMILY MEDICINE

## 2022-01-01 PROCEDURE — 93010 ELECTROCARDIOGRAM REPORT: CPT | Performed by: INTERNAL MEDICINE

## 2022-01-01 PROCEDURE — 83880 ASSAY OF NATRIURETIC PEPTIDE: CPT | Performed by: EMERGENCY MEDICINE

## 2022-01-01 PROCEDURE — 82803 BLOOD GASES ANY COMBINATION: CPT

## 2022-01-01 PROCEDURE — 71045 X-RAY EXAM CHEST 1 VIEW: CPT

## 2022-01-01 PROCEDURE — 99215 OFFICE O/P EST HI 40 MIN: CPT | Performed by: NURSE PRACTITIONER

## 2022-01-01 PROCEDURE — 25010000002 HYDROMORPHONE 1 MG/ML SOLUTION: Performed by: INTERNAL MEDICINE

## 2022-01-01 PROCEDURE — 99213 OFFICE O/P EST LOW 20 MIN: CPT | Performed by: NURSE PRACTITIONER

## 2022-01-01 PROCEDURE — 99238 HOSP IP/OBS DSCHRG MGMT 30/<: CPT | Performed by: INTERNAL MEDICINE

## 2022-01-01 PROCEDURE — 0202U NFCT DS 22 TRGT SARS-COV-2: CPT | Performed by: EMERGENCY MEDICINE

## 2022-01-01 RX ORDER — ONDANSETRON 4 MG/1
4 TABLET, FILM COATED ORAL EVERY 8 HOURS PRN
Qty: 15 TABLET | Refills: 0 | Status: SHIPPED | OUTPATIENT
Start: 2022-01-01 | End: 2022-01-01

## 2022-01-01 RX ORDER — ATORVASTATIN CALCIUM 80 MG/1
80 TABLET, FILM COATED ORAL DAILY
Qty: 84 TABLET | Refills: 0 | Status: SHIPPED | OUTPATIENT
Start: 2022-01-01

## 2022-01-01 RX ORDER — TRAMADOL HYDROCHLORIDE 50 MG/1
50 TABLET ORAL EVERY 6 HOURS PRN
Qty: 1 TABLET | Refills: 0
Start: 2022-01-01 | End: 2022-01-01

## 2022-01-01 RX ORDER — SCOLOPAMINE TRANSDERMAL SYSTEM 1 MG/1
1 PATCH, EXTENDED RELEASE TRANSDERMAL
Status: DISCONTINUED | OUTPATIENT
Start: 2022-01-01 | End: 2022-01-01 | Stop reason: HOSPADM

## 2022-01-01 RX ORDER — GLYCOPYRROLATE 0.2 MG/ML
0.8 INJECTION INTRAMUSCULAR; INTRAVENOUS EVERY 4 HOURS PRN
Status: DISCONTINUED | OUTPATIENT
Start: 2022-01-01 | End: 2022-01-01 | Stop reason: HOSPADM

## 2022-01-01 RX ORDER — MULTIPLE VITAMINS W/ MINERALS TAB 9MG-400MCG
1 TAB ORAL DAILY
Qty: 100 TABLET | Refills: 0 | Status: SHIPPED | OUTPATIENT
Start: 2022-01-01 | End: 2022-01-01 | Stop reason: SDUPTHER

## 2022-01-01 RX ORDER — GLYCOPYRROLATE 0.2 MG/ML
0.4 INJECTION INTRAMUSCULAR; INTRAVENOUS
Status: DISCONTINUED | OUTPATIENT
Start: 2022-01-01 | End: 2022-01-01 | Stop reason: HOSPADM

## 2022-01-01 RX ORDER — ESCITALOPRAM OXALATE 5 MG/1
5 TABLET ORAL DAILY
Qty: 90 TABLET | Refills: 0 | Status: SHIPPED | OUTPATIENT
Start: 2022-01-01 | End: 2022-01-01 | Stop reason: SDUPTHER

## 2022-01-01 RX ORDER — DICYCLOMINE HCL 20 MG
10 TABLET ORAL 3 TIMES DAILY PRN
Qty: 30 TABLET | Refills: 0 | Status: SHIPPED | OUTPATIENT
Start: 2022-01-01 | End: 2022-01-01

## 2022-01-01 RX ORDER — DIPHENOXYLATE HYDROCHLORIDE AND ATROPINE SULFATE 2.5; .025 MG/1; MG/1
1 TABLET ORAL
Status: DISCONTINUED | OUTPATIENT
Start: 2022-01-01 | End: 2022-01-01 | Stop reason: HOSPADM

## 2022-01-01 RX ORDER — CEFDINIR 300 MG/1
300 CAPSULE ORAL 2 TIMES DAILY
COMMUNITY
Start: 2022-01-01 | End: 2022-10-03

## 2022-01-01 RX ORDER — DONEPEZIL HYDROCHLORIDE 10 MG/1
10 TABLET, FILM COATED ORAL DAILY
Qty: 90 TABLET | Refills: 0 | Status: SHIPPED | OUTPATIENT
Start: 2022-01-01 | End: 2022-01-01 | Stop reason: SDUPTHER

## 2022-01-01 RX ORDER — LORAZEPAM 2 MG/ML
0.5 CONCENTRATE ORAL
Status: DISCONTINUED | OUTPATIENT
Start: 2022-01-01 | End: 2022-01-01 | Stop reason: HOSPADM

## 2022-01-01 RX ORDER — MULTIPLE VITAMINS W/ MINERALS TAB 9MG-400MCG
1 TAB ORAL DAILY
Qty: 100 TABLET | Refills: 0 | Status: SHIPPED | OUTPATIENT
Start: 2022-01-01 | End: 2022-01-01 | Stop reason: SDDI

## 2022-01-01 RX ORDER — MULTIPLE VITAMINS W/ MINERALS TAB 9MG-400MCG
1 TAB ORAL DAILY
COMMUNITY
End: 2022-01-01 | Stop reason: SDUPTHER

## 2022-01-01 RX ORDER — PANTOPRAZOLE SODIUM 40 MG/1
40 TABLET, DELAYED RELEASE ORAL DAILY
COMMUNITY

## 2022-01-01 RX ORDER — QUETIAPINE FUMARATE 25 MG/1
12.5 TABLET, FILM COATED ORAL NIGHTLY
Qty: 14 TABLET | Refills: 2 | Status: SHIPPED | OUTPATIENT
Start: 2022-01-01 | End: 2022-01-01 | Stop reason: SDUPTHER

## 2022-01-01 RX ORDER — DONEPEZIL HYDROCHLORIDE 5 MG/1
5 TABLET, FILM COATED ORAL NIGHTLY
COMMUNITY

## 2022-01-01 RX ORDER — SENNA PLUS 8.6 MG/1
1 TABLET ORAL 2 TIMES DAILY
COMMUNITY

## 2022-01-01 RX ORDER — ACETAMINOPHEN 325 MG/1
650 TABLET ORAL EVERY 4 HOURS PRN
COMMUNITY

## 2022-01-01 RX ORDER — TRAMADOL HYDROCHLORIDE 50 MG/1
50 TABLET ORAL EVERY 8 HOURS PRN
Qty: 90 TABLET | Refills: 0 | Status: SHIPPED | OUTPATIENT
Start: 2022-01-01 | End: 2022-01-01 | Stop reason: SDUPTHER

## 2022-01-01 RX ORDER — CLOPIDOGREL BISULFATE 75 MG/1
75 TABLET ORAL DAILY
Qty: 90 TABLET | Refills: 0 | Status: SHIPPED | OUTPATIENT
Start: 2022-01-01

## 2022-01-01 RX ORDER — POLYETHYLENE GLYCOL 3350 17 G/17G
POWDER, FOR SOLUTION ORAL
COMMUNITY
Start: 2022-01-01 | End: 2022-01-01 | Stop reason: SDUPTHER

## 2022-01-01 RX ORDER — TRAMADOL HYDROCHLORIDE 50 MG/1
50 TABLET ORAL EVERY 6 HOURS PRN
COMMUNITY
End: 2022-01-01 | Stop reason: SDUPTHER

## 2022-01-01 RX ORDER — DONEPEZIL HYDROCHLORIDE 10 MG/1
10 TABLET, FILM COATED ORAL DAILY
Qty: 90 TABLET | Refills: 0 | Status: SHIPPED | OUTPATIENT
Start: 2022-01-01 | End: 2022-01-01

## 2022-01-01 RX ORDER — METOPROLOL SUCCINATE 25 MG/1
25 TABLET, EXTENDED RELEASE ORAL DAILY
Qty: 84 TABLET | Refills: 0 | Status: SHIPPED | OUTPATIENT
Start: 2022-01-01

## 2022-01-01 RX ORDER — GLYCOPYRROLATE 0.2 MG/ML
0.2 INJECTION INTRAMUSCULAR; INTRAVENOUS
Status: DISCONTINUED | OUTPATIENT
Start: 2022-01-01 | End: 2022-01-01 | Stop reason: HOSPADM

## 2022-01-01 RX ORDER — ESCITALOPRAM OXALATE 5 MG/1
5 TABLET ORAL DAILY
Qty: 90 TABLET | Refills: 0 | Status: SHIPPED | OUTPATIENT
Start: 2022-01-01

## 2022-01-01 RX ORDER — QUETIAPINE FUMARATE 25 MG/1
12.5 TABLET, FILM COATED ORAL NIGHTLY
Qty: 14 TABLET | Refills: 2 | Status: SHIPPED | OUTPATIENT
Start: 2022-01-01

## 2022-01-01 RX ORDER — LORAZEPAM 2 MG/ML
0.5 INJECTION INTRAMUSCULAR
Status: DISCONTINUED | OUTPATIENT
Start: 2022-01-01 | End: 2022-01-01 | Stop reason: HOSPADM

## 2022-01-01 RX ORDER — MORPHINE SULFATE 2 MG/ML
2 INJECTION, SOLUTION INTRAMUSCULAR; INTRAVENOUS
Status: DISCONTINUED | OUTPATIENT
Start: 2022-01-01 | End: 2022-01-01 | Stop reason: HOSPADM

## 2022-01-01 RX ORDER — METOPROLOL SUCCINATE 25 MG/1
25 TABLET, EXTENDED RELEASE ORAL DAILY
Qty: 84 TABLET | Refills: 0 | Status: SHIPPED | OUTPATIENT
Start: 2022-01-01 | End: 2022-01-01 | Stop reason: SDUPTHER

## 2022-01-01 RX ORDER — AMOXICILLIN 250 MG
CAPSULE ORAL
COMMUNITY
Start: 2022-01-01 | End: 2022-01-01

## 2022-01-01 RX ORDER — MORPHINE SULFATE 20 MG/ML
20 SOLUTION ORAL
Status: DISCONTINUED | OUTPATIENT
Start: 2022-01-01 | End: 2022-01-01 | Stop reason: HOSPADM

## 2022-01-01 RX ORDER — ACETAMINOPHEN 650 MG/1
650 SUPPOSITORY RECTAL EVERY 4 HOURS PRN
Status: DISCONTINUED | OUTPATIENT
Start: 2022-01-01 | End: 2022-01-01 | Stop reason: HOSPADM

## 2022-01-01 RX ORDER — LORAZEPAM 2 MG/ML
2 INJECTION INTRAMUSCULAR
Status: DISCONTINUED | OUTPATIENT
Start: 2022-01-01 | End: 2022-01-01 | Stop reason: HOSPADM

## 2022-01-01 RX ORDER — TRAMADOL HYDROCHLORIDE 50 MG/1
50 TABLET ORAL EVERY 8 HOURS PRN
Qty: 20 TABLET | Refills: 0 | Status: SHIPPED | OUTPATIENT
Start: 2022-01-01 | End: 2022-01-01 | Stop reason: SDUPTHER

## 2022-01-01 RX ORDER — AMLODIPINE BESYLATE 2.5 MG/1
2.5 TABLET ORAL NIGHTLY
Qty: 28 TABLET | Refills: 2 | Status: SHIPPED | OUTPATIENT
Start: 2022-01-01 | End: 2022-01-01 | Stop reason: SDUPTHER

## 2022-01-01 RX ORDER — ASPIRIN 81 MG/1
81 TABLET ORAL DAILY
Qty: 28 TABLET | Refills: 2 | Status: SHIPPED | OUTPATIENT
Start: 2022-01-01 | End: 2022-01-01

## 2022-01-01 RX ORDER — BUMETANIDE 1 MG/1
1 TABLET ORAL DAILY PRN
COMMUNITY

## 2022-01-01 RX ORDER — LORAZEPAM 2 MG/ML
1 CONCENTRATE ORAL
Status: DISCONTINUED | OUTPATIENT
Start: 2022-01-01 | End: 2022-01-01 | Stop reason: HOSPADM

## 2022-01-01 RX ORDER — MEMANTINE HYDROCHLORIDE 10 MG/1
10 TABLET ORAL 2 TIMES DAILY
Qty: 180 TABLET | Refills: 0 | Status: SHIPPED | OUTPATIENT
Start: 2022-01-01 | End: 2022-01-01 | Stop reason: SDUPTHER

## 2022-01-01 RX ORDER — DICYCLOMINE HCL 20 MG
10 TABLET ORAL 3 TIMES DAILY PRN
Qty: 30 TABLET | Refills: 0 | Status: SHIPPED | OUTPATIENT
Start: 2022-01-01 | End: 2022-01-01 | Stop reason: SDUPTHER

## 2022-01-01 RX ORDER — ATORVASTATIN CALCIUM 80 MG/1
80 TABLET, FILM COATED ORAL DAILY
Qty: 84 TABLET | Refills: 0 | Status: SHIPPED | OUTPATIENT
Start: 2022-01-01 | End: 2022-01-01 | Stop reason: SDUPTHER

## 2022-01-01 RX ORDER — POLYETHYLENE GLYCOL 3350 17 G/17G
17 POWDER, FOR SOLUTION ORAL DAILY PRN
Qty: 238 G | Refills: 1 | Status: SHIPPED | OUTPATIENT
Start: 2022-01-01

## 2022-01-01 RX ORDER — MORPHINE SULFATE 10 MG/ML
6 INJECTION INTRAMUSCULAR; INTRAVENOUS; SUBCUTANEOUS
Status: DISCONTINUED | OUTPATIENT
Start: 2022-01-01 | End: 2022-01-01 | Stop reason: HOSPADM

## 2022-01-01 RX ORDER — LORAZEPAM 2 MG/ML
2 CONCENTRATE ORAL
Status: DISCONTINUED | OUTPATIENT
Start: 2022-01-01 | End: 2022-01-01 | Stop reason: HOSPADM

## 2022-01-01 RX ORDER — CLOPIDOGREL BISULFATE 75 MG/1
75 TABLET ORAL DAILY
Qty: 90 TABLET | Refills: 0 | Status: SHIPPED | OUTPATIENT
Start: 2022-01-01 | End: 2022-01-01 | Stop reason: SDUPTHER

## 2022-01-01 RX ORDER — LORAZEPAM 2 MG/ML
1 INJECTION INTRAMUSCULAR
Status: DISCONTINUED | OUTPATIENT
Start: 2022-01-01 | End: 2022-01-01 | Stop reason: HOSPADM

## 2022-01-01 RX ORDER — MORPHINE SULFATE 20 MG/ML
10 SOLUTION ORAL
Status: DISCONTINUED | OUTPATIENT
Start: 2022-01-01 | End: 2022-01-01 | Stop reason: HOSPADM

## 2022-01-01 RX ORDER — ACETAMINOPHEN 325 MG/1
650 TABLET ORAL EVERY 4 HOURS PRN
Status: DISCONTINUED | OUTPATIENT
Start: 2022-01-01 | End: 2022-01-01 | Stop reason: HOSPADM

## 2022-01-01 RX ORDER — AZITHROMYCIN 250 MG/1
TABLET, FILM COATED ORAL
Qty: 6 TABLET | Refills: 0 | Status: SHIPPED | OUTPATIENT
Start: 2022-01-01 | End: 2022-01-01

## 2022-01-01 RX ORDER — ALBUTEROL SULFATE 0.63 MG/3ML
1 SOLUTION RESPIRATORY (INHALATION) 4 TIMES DAILY
COMMUNITY

## 2022-01-01 RX ORDER — MORPHINE SULFATE 20 MG/ML
5 SOLUTION ORAL
Status: DISCONTINUED | OUTPATIENT
Start: 2022-01-01 | End: 2022-01-01 | Stop reason: HOSPADM

## 2022-01-01 RX ORDER — ASPIRIN 81 MG/1
81 TABLET ORAL DAILY
Qty: 28 TABLET | Refills: 2 | Status: SHIPPED | OUTPATIENT
Start: 2022-01-01 | End: 2022-01-01 | Stop reason: SDUPTHER

## 2022-01-01 RX ORDER — MEMANTINE HYDROCHLORIDE 10 MG/1
10 TABLET ORAL 2 TIMES DAILY
Qty: 180 TABLET | Refills: 0 | Status: SHIPPED | OUTPATIENT
Start: 2022-01-01

## 2022-01-01 RX ORDER — BUMETANIDE 2 MG/1
2 TABLET ORAL DAILY
Qty: 30 TABLET | Refills: 0
Start: 2022-01-01 | End: 2022-01-01

## 2022-01-01 RX ORDER — DICYCLOMINE HYDROCHLORIDE 10 MG/1
10 CAPSULE ORAL
COMMUNITY

## 2022-01-01 RX ORDER — HYDROMORPHONE HYDROCHLORIDE 1 MG/ML
0.5 INJECTION, SOLUTION INTRAMUSCULAR; INTRAVENOUS; SUBCUTANEOUS
Status: DISCONTINUED | OUTPATIENT
Start: 2022-01-01 | End: 2022-01-01 | Stop reason: HOSPADM

## 2022-01-01 RX ORDER — OXYCODONE HYDROCHLORIDE AND ACETAMINOPHEN 5; 325 MG/1; MG/1
1 TABLET ORAL EVERY 4 HOURS PRN
COMMUNITY

## 2022-01-01 RX ORDER — TRAMADOL HYDROCHLORIDE 50 MG/1
50 TABLET ORAL EVERY 6 HOURS PRN
OUTPATIENT
Start: 2022-01-01

## 2022-01-01 RX ORDER — HYDROMORPHONE HCL 110MG/55ML
1.5 PATIENT CONTROLLED ANALGESIA SYRINGE INTRAVENOUS
Status: DISCONTINUED | OUTPATIENT
Start: 2022-01-01 | End: 2022-01-01 | Stop reason: HOSPADM

## 2022-01-01 RX ORDER — MORPHINE SULFATE 4 MG/ML
4 INJECTION, SOLUTION INTRAMUSCULAR; INTRAVENOUS
Status: DISCONTINUED | OUTPATIENT
Start: 2022-01-01 | End: 2022-01-01 | Stop reason: HOSPADM

## 2022-01-01 RX ORDER — CLOPIDOGREL BISULFATE 75 MG/1
TABLET ORAL
Qty: 90 TABLET | Refills: 0 | OUTPATIENT
Start: 2022-01-01

## 2022-01-01 RX ORDER — ONDANSETRON 4 MG/1
4 TABLET, ORALLY DISINTEGRATING ORAL EVERY 8 HOURS PRN
COMMUNITY

## 2022-01-01 RX ORDER — CLONIDINE HYDROCHLORIDE 0.1 MG/1
0.1 TABLET ORAL EVERY 12 HOURS SCHEDULED
Status: SHIPPED | OUTPATIENT
Start: 2022-01-01

## 2022-01-01 RX ORDER — AMLODIPINE BESYLATE 5 MG/1
5 TABLET ORAL NIGHTLY
Qty: 28 TABLET | Refills: 2 | Status: SHIPPED | OUTPATIENT
Start: 2022-01-01

## 2022-01-01 RX ORDER — MULTIPLE VITAMINS W/ MINERALS TAB 9MG-400MCG
1 TAB ORAL DAILY
Qty: 100 TABLET | Refills: 0 | Status: SHIPPED | OUTPATIENT
Start: 2022-01-01 | End: 2022-01-01

## 2022-01-01 RX ORDER — ACETAMINOPHEN 160 MG/5ML
650 SOLUTION ORAL EVERY 4 HOURS PRN
Status: DISCONTINUED | OUTPATIENT
Start: 2022-01-01 | End: 2022-01-01 | Stop reason: HOSPADM

## 2022-01-01 RX ADMIN — MORPHINE SULFATE 4 MG: 4 INJECTION, SOLUTION INTRAMUSCULAR; INTRAVENOUS at 01:14

## 2022-01-01 RX ADMIN — CLONIDINE HYDROCHLORIDE 0.1 MG: 0.1 TABLET ORAL at 11:33

## 2022-01-01 RX ADMIN — LORAZEPAM 2 MG: 2 INJECTION INTRAMUSCULAR; INTRAVENOUS at 17:45

## 2022-01-01 RX ADMIN — LORAZEPAM 2 MG: 2 INJECTION INTRAMUSCULAR; INTRAVENOUS at 09:05

## 2022-01-01 RX ADMIN — LORAZEPAM 2 MG: 2 INJECTION INTRAMUSCULAR; INTRAVENOUS at 08:31

## 2022-01-01 RX ADMIN — HYDROMORPHONE HYDROCHLORIDE 1 MG: 1 INJECTION, SOLUTION INTRAMUSCULAR; INTRAVENOUS; SUBCUTANEOUS at 12:14

## 2022-01-01 RX ADMIN — LORAZEPAM 2 MG: 2 INJECTION INTRAMUSCULAR; INTRAVENOUS at 13:33

## 2022-01-01 RX ADMIN — GLYCOPYRROLATE 0.4 MG: 0.2 INJECTION, SOLUTION INTRAMUSCULAR; INTRAVENOUS at 05:15

## 2022-01-01 RX ADMIN — MORPHINE SULFATE 2 MG: 2 INJECTION, SOLUTION INTRAMUSCULAR; INTRAVENOUS at 20:58

## 2022-01-01 RX ADMIN — MORPHINE SULFATE 4 MG: 4 INJECTION, SOLUTION INTRAMUSCULAR; INTRAVENOUS at 09:05

## 2022-01-01 RX ADMIN — MORPHINE SULFATE 4 MG: 4 INJECTION, SOLUTION INTRAMUSCULAR; INTRAVENOUS at 05:15

## 2022-01-01 RX ADMIN — LORAZEPAM 2 MG: 2 INJECTION INTRAMUSCULAR; INTRAVENOUS at 01:03

## 2022-01-01 RX ADMIN — GLYCOPYRROLATE 0.4 MG: 0.2 INJECTION, SOLUTION INTRAMUSCULAR; INTRAVENOUS at 09:05

## 2022-01-01 RX ADMIN — GLYCOPYRROLATE 0.8 MG: 0.2 INJECTION INTRAMUSCULAR; INTRAVENOUS at 09:57

## 2022-01-01 RX ADMIN — GLYCOPYRROLATE 0.4 MG: 0.2 INJECTION, SOLUTION INTRAMUSCULAR; INTRAVENOUS at 01:03

## 2022-01-01 RX ADMIN — GLYCOPYRROLATE 0.4 MG: 0.2 INJECTION, SOLUTION INTRAMUSCULAR; INTRAVENOUS at 20:48

## 2022-01-01 RX ADMIN — MORPHINE SULFATE 4 MG: 4 INJECTION, SOLUTION INTRAMUSCULAR; INTRAVENOUS at 05:19

## 2022-01-01 RX ADMIN — GLYCOPYRROLATE 0.4 MG: 0.2 INJECTION, SOLUTION INTRAMUSCULAR; INTRAVENOUS at 17:45

## 2022-01-01 RX ADMIN — MORPHINE SULFATE 4 MG: 4 INJECTION, SOLUTION INTRAMUSCULAR; INTRAVENOUS at 17:45

## 2022-01-01 RX ADMIN — LORAZEPAM 0.5 MG: 2 INJECTION INTRAMUSCULAR; INTRAVENOUS at 19:10

## 2022-01-01 RX ADMIN — MORPHINE SULFATE 4 MG: 4 INJECTION, SOLUTION INTRAMUSCULAR; INTRAVENOUS at 20:48

## 2022-01-01 RX ADMIN — MORPHINE SULFATE 4 MG: 4 INJECTION, SOLUTION INTRAMUSCULAR; INTRAVENOUS at 01:03

## 2022-01-01 RX ADMIN — LORAZEPAM 2 MG: 2 INJECTION INTRAMUSCULAR; INTRAVENOUS at 12:14

## 2022-01-01 RX ADMIN — GLYCOPYRROLATE 0.4 MG: 0.2 INJECTION, SOLUTION INTRAMUSCULAR; INTRAVENOUS at 13:33

## 2022-01-01 RX ADMIN — LORAZEPAM 2 MG: 2 INJECTION INTRAMUSCULAR; INTRAVENOUS at 20:48

## 2022-01-01 RX ADMIN — MORPHINE SULFATE 2 MG: 2 INJECTION, SOLUTION INTRAMUSCULAR; INTRAVENOUS at 19:10

## 2022-01-01 RX ADMIN — MORPHINE SULFATE 4 MG: 4 INJECTION, SOLUTION INTRAMUSCULAR; INTRAVENOUS at 13:33

## 2022-01-01 RX ADMIN — SCOPALAMINE 1 PATCH: 1 PATCH, EXTENDED RELEASE TRANSDERMAL at 09:57

## 2022-01-01 RX ADMIN — LORAZEPAM 2 MG: 2 INJECTION INTRAMUSCULAR; INTRAVENOUS at 05:19

## 2022-01-01 RX ADMIN — LORAZEPAM 1 MG: 2 INJECTION INTRAMUSCULAR; INTRAVENOUS at 01:14

## 2022-01-01 RX ADMIN — GLYCOPYRROLATE 0.4 MG: 0.2 INJECTION, SOLUTION INTRAMUSCULAR; INTRAVENOUS at 05:19

## 2022-01-01 RX ADMIN — MORPHINE SULFATE 4 MG: 4 INJECTION, SOLUTION INTRAMUSCULAR; INTRAVENOUS at 08:31

## 2022-01-01 RX ADMIN — LORAZEPAM 1 MG: 2 INJECTION INTRAMUSCULAR; INTRAVENOUS at 20:58

## 2022-01-01 RX ADMIN — LORAZEPAM 2 MG: 2 INJECTION INTRAMUSCULAR; INTRAVENOUS at 05:15

## 2022-01-01 RX ADMIN — GLYCOPYRROLATE 0.4 MG: 0.2 INJECTION, SOLUTION INTRAMUSCULAR; INTRAVENOUS at 08:31

## 2022-01-04 ENCOUNTER — HOSPITAL ENCOUNTER (EMERGENCY)
Dept: HOSPITAL 49 - FER | Age: 87
LOS: 1 days | Discharge: HOME | End: 2022-01-05
Payer: MEDICARE

## 2022-01-04 DIAGNOSIS — Z79.82: ICD-10-CM

## 2022-01-04 DIAGNOSIS — I50.9: Primary | ICD-10-CM

## 2022-01-04 DIAGNOSIS — N18.9: ICD-10-CM

## 2022-01-04 DIAGNOSIS — F01.50: ICD-10-CM

## 2022-01-04 DIAGNOSIS — Z20.822: ICD-10-CM

## 2022-01-04 DIAGNOSIS — I25.2: ICD-10-CM

## 2022-01-04 DIAGNOSIS — Z88.0: ICD-10-CM

## 2022-01-04 DIAGNOSIS — Z79.02: ICD-10-CM

## 2022-01-04 PROCEDURE — U0002 COVID-19 LAB TEST NON-CDC: HCPCS

## 2022-01-05 LAB
BASOPHIL: 1.2 % (ref 0–2)
BUN SERPL-MCNC: 39 MG/DL (ref 7–18)
BUN/CREAT RATIO (CALC): 23 RATIO
CHLORIDE: 104 MMOL/L (ref 98–107)
CO2 (BICARBONATE): 28 MMOL/L (ref 21–32)
CREATININE: 1.63 MG/DL (ref 0.67–1.17)
EOSINOPHIL: 10.4 % (ref 0–7)
GLUCOSE SERPL-MCNC: 127 MG/DL (ref 74–106)
HCT: 40.4 % (ref 42–52)
HGB BLD-MCNC: 12.8 G/DL (ref 13.2–18)
LYMPHOCYTE: 16.1 % (ref 15–48)
MCH RBC QN AUTO: 31.1 PG (ref 25–31)
MCHC RBC AUTO-ENTMCNC: 31.7 G/DL (ref 32–36)
MCV: 98.3 FL (ref 78–100)
MONOCYTE: 10.3 % (ref 0–12)
MPV: 10.7 FL (ref 6–9.5)
NEUTROPHIL: 61.6 % (ref 41–80)
NRBC: 0
PLT: 184 K/UL (ref 150–400)
POTASSIUM: 5.1 MMOL/L (ref 3.5–5.1)
RBC MORPHOLOGY: NORMAL
RBC: 4.11 M/UL (ref 4.7–6)
RDW: 13.8 % (ref 11.5–14)
WBC: 8.4 K/UL (ref 4–10.5)

## 2022-01-05 NOTE — OUTREACH NOTE
Ambulatory Case Management Note    Called Karyn to check on her dad.  Called ambulance due to caregiver reports he was complaining of chest pain, left arm and neck pain and 3lb weight gain.  All reports were reviewed.  She does report that his oxygen dropped into the 70's.  Will try to get a walk test with cardiology or home health. They did keep him for observation.  Discharged. He is to follow up with cardiology next week.        Naomy Brunson RN  Ambulatory Case Management    1/5/2022, 16:48 EST    [facility] Interim Update    Spoke with Amber, home health and we discussed getting a walk test done for him, however they do not have oxygen at the home to do the recovery.  Teresa at [facility] office is going to assist at his appointment on the 17th.

## 2022-01-21 NOTE — OUTREACH NOTE
Ambulatory Case Management Note    Attempted to call Karyn to inquire about the appointment yesterday with cardiology.  Cardiology attempted to do a walk test but did not have the oxygen for completion.  Encouraged that they can come here and have the walk test administered and we can order oxygen if he qualifies.      Naomy Brunson RN  Ambulatory Case Management    1/21/2022, 11:39 EST

## 2022-01-29 NOTE — OUTREACH NOTE
[facility] End of Month Documentation    This Chronic Medical Management Care Plan for Blaine Morales, 88 y.o. male, has been monitored and managed and a new plan of care implemented for the month of January.  A cumulative time of 27  minutes was spent on this patient record this month, including phone call with relative; electronic communication with primary care provider; electronic communication with pharmacist; chart review, Monitoring and managing care with assistance of daughter..    Regarding the patient's problems: has Acute pain of right shoulder; Trigger point of right shoulder region; Bilateral carotid artery stenosis; CKD (chronic kidney disease) stage 3, GFR 30-59 ml/min ([facility]); Arteriosclerosis of coronary artery; Alzheimer disease ([facility]); Hypertension; Hyperlipidemia; Abnormal weight loss; Angina pectoris ([facility]); Aortic aneurysm ([facility]); Aortic valve stenosis; Diverticulitis; Dyspnea; Echocardiogram abnormal; Edema; Gastric ulcer; Heart murmur; High prostate specific antigen (PSA); Type 2 diabetes mellitus ([facility]); Hyperkalemia; 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 ([facility]); Obesity (BMI 30-39.9); PAD (peripheral artery disease) ([facility]); Peripheral nerve disease; Pneumonia; Presence of aortocoronary bypass graft; Presence of prosthetic heart valve; Right carotid artery occlusion; Stenosis of carotid artery; Stenosis of left subclavian artery ([facility]); Systolic murmur; Lung nodule; Pressure injury of left buttock, stage 1; History of smoking; and CHF (congestive heart failure) ([facility]) on their problem list., the following items were addressed: medications; transitions to medical care, with multiple hospital visits, medication have been changed multiple times. and any changes can be found within the plan section of the note.  A detailed listing of time spent for chronic care management is tracked within each outreach encounter.  Current medications include:  has a current medication  list which includes the following prescription(s): amlodipine, aspirin, atorvastatin, bumetanide, clopidogrel, donepezil, escitalopram, gabapentin, memantine, metoprolol succinate xl, multivitamin with minerals, ondansetron odt, promethazine, quetiapine, and tramadol. and the patient is reported to be patient is compliant with medication protocol, Compliant,  Medications are reported to be effective, medication has been helping the pvd..  Regarding these diagnoses, referrals were made to the following provider(s):  specialists.  All notes on chart for PCP to review.    The patient was monitored remotely for pain; medications; weight; activity level; mood & behavior, Monitors and reports abonormalities and changes..    The patient's physical needs include:  needs assistance with ADLs; help taking medications as prescribed; physician referral,  has round the clock caregivers that provide assistance..     The patient's mental support needs include:  continued support, supportive.  Loss of wife, depression.    The patient's cognitive support needs include:  continued support; household care; personal care; supervision, Reminders provided and support given.    The patient's psychosocial support needs include:  continued support, Primarily homebound, daily trips to [facility].    The patient's functional needs include: needs assistance for ADLs; physical healthcare; physician referral, some ADL's    The patient's environmental needs include:  no involvement in outside activities or no access to other activities, n/a    Care Plan overall comments:  Monitoring and managing health and other care providers.    Refer to previous outreach notes for more information on the areas listed above.    Monthly Billing Diagnoses  (I50.9) Chronic congestive heart failure, unspecified heart failure type (HCC)    (N18.32) Stage 3b chronic kidney disease (HCC)    (G30.9,  F02.80) Alzheimer disease (HCC)    Medications   · Medications have  been reconciled    Care Plan progress this month:      Recently Modified Care Plans Updates made since 12/29/2021 12:00 AM    No recently modified care plans.          Instructions   · Patient was provided an electronic copy of care plan  · CCM services were explained and offered and patient has accepted these services.  · Patient has given their written consent to receive CCM services and understands that this includes the authorization of electronic communication of medical information with the other treating providers.  · Patient understands that they may stop CCM services at any time and these changes will be effective at the end of the calendar month and will effectively revocate the agreement of CCM services.  · Patient understands that only one practitioner can furnish and be paid for CCM services during one calendar month.  Patient also understands that there may be co-payment or deductible fees in association with CCM services.  · Patient will continue with at least monthly follow-up calls with the Nurse Navigator.    Naomy Brunson RN  Ambulatory Case Management    1/29/2022, 15:55 EST

## 2022-02-11 NOTE — OUTREACH NOTE
Ambulatory Case Management Note    Lab order in advance of 2/22/22 office visit.        Naomy Brunson RN  Ambulatory Case Management    2/11/2022, 16:33 EST    Dr Allen notes that he just had labs in January with ER visit, no labs needed at this time.

## 2022-02-11 NOTE — PROGRESS NOTES
He had labs at [facility] January 5 so I do not think he needs lab work prior to his appointment

## 2022-02-11 NOTE — TELEPHONE ENCOUNTER
Karyn called; reports that her dad is Feeling off and on since Wednesday, runny nose, fatigue, headache.  Today fever 101. Caretaker did have pneumonia week before last and returned to work on Monday. She may have had something more and passed it on.  Advised that they seek care at immediate care center to rule out flu, pneumonia or covid.  Karyn will keep me notified on where they decide to go.

## 2022-02-15 NOTE — TELEPHONE ENCOUNTER
Called and left message regarding Friday's conversation, if she took him to immediate care, he was not seen at the ER, checked with [facility].

## 2022-02-15 NOTE — TELEPHONE ENCOUNTER
Karyn did take him to [facility] immediate care. Told to continue Claritin , Flonase and Mucinex, had a virus of some kind, no covid, no flu.  Things are going ok, has an appointment next week with PCP.

## 2022-02-26 NOTE — OUTREACH NOTE
Ambulatory Case Management Note  LATE ENTRY: 2/22/22:  Karyn had left a voice message stating that she could not bring her did in today due to the weather (rainy) and she also had her daughter who has a disability, but she wanted me to know that she scheduled with Dr. Michael.  Called Karyn back to explain that in the case with her father, it is important that he sees only his PCP for his checkups.  There is no benefit for him to see a APRN who will not be caring for him.  In the case of an illness, he could see the NP, but needs to keep his appointments due to Dr. Allen is booked almost 3 months in advance.  She verbalized understanding.  Only trying to provide the best care for her dad.      Naomy Brunson RN  Ambulatory Case Management    [facility] End of Month Documentation    This Chronic Medical Management Care Plan for Blaine Morales, 88 y.o. male, has been monitored and managed and a new plan of care implemented for the month of February.  A cumulative time of 30  minutes was spent on this patient record this month, including phone call with relative; electronic communication with primary care provider; electronic communication with pharmacist; chart review, Monitoring and managing care with assistance of daughter..    Regarding the patient's problems: has Acute pain of right shoulder; Trigger point of right shoulder region; Bilateral carotid artery stenosis; CKD (chronic kidney disease) stage 3, GFR 30-59 ml/min ([facility]); Arteriosclerosis of coronary artery; Alzheimer disease ([facility]); Hypertension; Hyperlipidemia; Abnormal weight loss; Angina pectoris ([facility]); Aortic aneurysm ([facility]); Aortic valve stenosis; Diverticulitis; Dyspnea; Echocardiogram abnormal; Edema; Gastric ulcer; Heart murmur; High prostate specific antigen (PSA); Type 2 diabetes mellitus ([facility]); Hyperkalemia; 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 ([facility]); Obesity (BMI 30-39.9); PAD (peripheral artery disease) ([facility]);  Peripheral nerve disease; Pneumonia; Presence of aortocoronary bypass graft; Presence of prosthetic heart valve; Right carotid artery occlusion; Stenosis of carotid artery; Stenosis of left subclavian artery (HCC); Systolic murmur; Lung nodule; Pressure injury of left buttock, stage 1; History of smoking; and CHF (congestive heart failure) ([facility]) on their problem list., the following items were addressed: medications; transitions to medical care, with multiple hospital visits, medication have been changed multiple times. and any changes can be found within the plan section of the note.  A detailed listing of time spent for chronic care management is tracked within each outreach encounter.  Current medications include:  has a current medication list which includes the following prescription(s): amlodipine, aspirin, atorvastatin, bumetanide, clopidogrel, donepezil, escitalopram, gabapentin, memantine, metoprolol succinate xl, multivitamin with minerals, ondansetron odt, promethazine, quetiapine, and tramadol. and the patient is reported to be patient is compliant with medication protocol, Compliant,  Medications are reported to be effective, medication has been helping the pvd..  Regarding these diagnoses, referrals were made to the following provider(s):  specialists.  All notes on chart for PCP to review.    The patient was monitored remotely for pain; medications; weight; activity level; mood & behavior, Monitors and reports abonormalities and changes..    The patient's physical needs include:  needs assistance with ADLs; help taking medications as prescribed; physician referral,  has round the clock caregivers that provide assistance..     The patient's mental support needs include:  continued support, supportive.  Loss of wife, depression.    The patient's cognitive support needs include:  continued support; household care; personal care; supervision, Reminders provided and support given.    The patient's  psychosocial support needs include:  continued support, Primarily homebound[facility]    The patient's functional needs include: needs assistance for ADLs; physical healthcare; physician referral, some ADL's    The patient's environmental needs include:  no involvement in outside activities or no access to other activities, n/a    Care Plan overall comments:  Monitoring and managing health and other care providers.    Refer to previous outreach notes for more information on the areas listed above.    Monthly Billing Diagnoses  (N18.32) Stage 3b chronic kidney disease (HCC)    (I50.9) Chronic congestive heart failure, unspecified heart failure type (HCC)    Medications   · Medications have been reconciled    Care Plan progress this month:      Recently Modified Care Plans Updates made since 1/26/2022 12:00 AM    No recently modified care plans.            Instructions   · Patient was provided an electronic copy of care plan  · CCM services were explained and offered and patient has accepted these services.  · Patient has given their written consent to receive CCM services and understands that this includes the authorization of electronic communication of medical information with the other treating providers.  · Patient understands that they may stop CCM services at any time and these changes will be effective at the end of the calendar month and will effectively revocate the agreement of CCM services.  · Patient understands that only one practitioner can furnish and be paid for CCM services during one calendar month.  Patient also understands that there may be co-payment or deductible fees in association with CCM services.  · Patient will continue with at least monthly follow-up calls with the Nurse Navigator.    Naomy Brunson RN  Ambulatory Case Management    2/26/2022, 15:04 EST

## 2022-03-15 NOTE — TELEPHONE ENCOUNTER
Karyn called back stating that he was having some stomach pain, he did have a BM this am so not constipation.  He is going to take some pepto bismol and had some zofran at home.   Karyn was worried that [facility] told him that his kidney function had worsened and she was concerned that he could have a UTI - complained to them of low back.  Explained that CKD and UTI , even though both are kidney related, does not make you more susceptible.  She felt relieved.    She is going to continue to monitor and report any problems he is having.

## 2022-03-15 NOTE — OUTREACH NOTE
AMBULATORY CASE MANAGEMENT NOTE    Name and Relationship of Patient/Support Person: Karyn Kitchen W - Emergency Contact     30  Minutes of time spent in patient care, reviewing records prior to the encounter, discussing with patient, entering orders and documentation.     Education Documentation  No documentation found.        HELEN R  Ambulatory Case Management    3/15/2022, 16:21 EDT   Adult Patient Profile  Questions/Answers    Flowsheet Row Most Recent Value   Symptoms/Conditions Managed at Home neurological, peripheral/neurovascular   Barriers to Managing Health age, stress of chronic illness   Neurological Symptoms/Conditions Alzheimer's disease   Neurological Management Strategies medication therapy, routine screening, coping strategies   Neurological Self-Management Outcome 3 (uncertain)   Peripheral Neurovascular Symptoms/Conditions neuropathy   Peripheral Neurovascular Management Strategies adequate rest, medication therapy, routine screening   Peripheral Neurovascular Self-Management Outcome 3 (uncertain)   Identifying Life Goals Want to be with wife ,  family safety, healthy   Identifying Health Goals keep illness under control, personal or family safety   Importance of Change 5   Confidence to Make Change 5   Readiness to Change 5   Missed Doses of Prescribed Medications During Past Week no   Taken Prescribed Medications at Different Time or Schedule During Past Week no   Taken More or Less Medication Than Prescribed no   Barriers to Taking Medication as Prescribed forget to take it   How to be Addressed AG   How Well Do You Speak English? very well   Source of Information family   Hearing Difficulty or Deaf no   Wear Glasses or Blind yes   Vision Management vision and reading   Concentrating, Remembering or Making Decisions Difficulty yes   Difficulty Communicating no   Difficulty Eating/Swallowing no   Walking or Climbing Stairs Difficulty yes   Walking or Climbing Stairs other (see comments)   [stairs, soa on exertion]   Dressing/Bathing Difficulty --  [has caregivers, ]   Equipment Currently Used at Home shower chair, pulse ox, scales, bp cuff   Major Change/Loss/Stressor/Fears death of a loved one, medical condition, family   Techniques to Media with Loss/Stress/Change diversional activities, spiritual practice(s)   Spiritual, Cultural Beliefs, Islam Practices, Values that Affect Care no   Advance Directive Status Patient has advance directive, copy in chart   Type of Advance Directive Health care directive/Living will   Health Maintenance Behaviors annual physical exam, exercise, healthy diet, hobbies   Usual Activity Tolerance moderate   Current Activity Tolerance moderate   Nutrition Risk Screen no indicators present   Have you fallen in the past year? No   Do you feel unsteady with walking? Yes   Patient Fall Risk Assessment Score  0   Fall Risk Category Moderate   Primary Source of Support/Comfort child(fidel), nonrelative caregiver   People in Home alone, other (see comments)  [caregivers in home]   Family Caregiver if Needed child(fidel), adult   Current Living Arrangements home   Resource/Environmental Concerns none        Adult Wellness Assessment  Questions/Answers    Flowsheet Row Most Recent Value   Help with Reading Health-Related Information occasionally   Problems Learning about Medical Condition occasionally   Regular Exercise no   Keeps from Regular Exercise illness   Current Feelings of Stress somewhat   Home and Personal Safety Concerns none   Phone Family/Friends/Neighbors per Week 1   Gather with Friends/Family per Week 3   Temple/Islam Services per Week 0   Club/Organization Meetings per Year 0   Social Isolation Score 4        Social Work Assessment  Questions/Answers    Flowsheet Row Most Recent Value   People in Home alone, other (see comments)  [caregivers in home]   Current Living Arrangements home   Family Caregiver if Needed child(fidel), adult   Spiritual, Cultural  Beliefs, Church Practices, Values that Affect Care no   Usual Activity Tolerance moderate   Current Activity Tolerance moderate   Techniques to [place] with Loss/Stress/Change diversional activities, spiritual practice(s)   Equipment Currently Used at Home shower chair, pulse ox, scales, bp cuff

## 2022-03-15 NOTE — TELEPHONE ENCOUNTER
Karyn called inquiring about 3 refills, informed sent    Also she was requesting a refill on Zofran/phenergan.  Inquired what symptoms he was having and she was unceratin, Pancho is at the house and said that he had an upset stomach and was worried he might have a little bug. Asked her to get some more details so to pass along toMSTAN to understand what medication is for.  Otherwise, if only upset stomach recommend he try OTC pepto bismol.

## 2022-03-16 ENCOUNTER — HOSPITAL ENCOUNTER (INPATIENT)
Dept: HOSPITAL 49 - FER | Age: 87
LOS: 3 days | Discharge: HOME HEALTH | DRG: 193 | End: 2022-03-19
Attending: INTERNAL MEDICINE | Admitting: INTERNAL MEDICINE
Payer: MEDICARE

## 2022-03-16 VITALS — HEIGHT: 72.01 IN | WEIGHT: 174.13 LBS | BODY MASS INDEX: 23.58 KG/M2

## 2022-03-16 DIAGNOSIS — F03.90: ICD-10-CM

## 2022-03-16 DIAGNOSIS — I13.0: ICD-10-CM

## 2022-03-16 DIAGNOSIS — J18.9: Primary | ICD-10-CM

## 2022-03-16 DIAGNOSIS — I50.33: ICD-10-CM

## 2022-03-16 DIAGNOSIS — I25.10: ICD-10-CM

## 2022-03-16 DIAGNOSIS — Z98.890: ICD-10-CM

## 2022-03-16 DIAGNOSIS — Z95.2: ICD-10-CM

## 2022-03-16 DIAGNOSIS — Z95.1: ICD-10-CM

## 2022-03-16 DIAGNOSIS — E11.22: ICD-10-CM

## 2022-03-16 DIAGNOSIS — N17.9: ICD-10-CM

## 2022-03-16 DIAGNOSIS — N18.30: ICD-10-CM

## 2022-03-16 DIAGNOSIS — J96.91: ICD-10-CM

## 2022-03-16 DIAGNOSIS — I25.2: ICD-10-CM

## 2022-03-16 DIAGNOSIS — Z20.822: ICD-10-CM

## 2022-03-16 DIAGNOSIS — Z88.2: ICD-10-CM

## 2022-03-16 DIAGNOSIS — J21.9: ICD-10-CM

## 2022-03-16 DIAGNOSIS — Z79.02: ICD-10-CM

## 2022-03-16 DIAGNOSIS — Z79.899: ICD-10-CM

## 2022-03-16 DIAGNOSIS — R91.8: ICD-10-CM

## 2022-03-16 DIAGNOSIS — Z88.0: ICD-10-CM

## 2022-03-16 DIAGNOSIS — Z85.828: ICD-10-CM

## 2022-03-16 DIAGNOSIS — Z79.82: ICD-10-CM

## 2022-03-16 DIAGNOSIS — D63.1: ICD-10-CM

## 2022-03-16 LAB
ALBUMIN SERPL-MCNC: 3.3 G/DL (ref 3.4–5)
ALKALINE PHOSHATASE: 86 U/L (ref 46–116)
ALT SERPL-CCNC: 28 U/L (ref 16–63)
AST: 19 U/L (ref 15–37)
BASOPHIL: 0.8 % (ref 0–2)
BILIRUBIN - TOTAL: 0.3 MG/DL (ref 0.2–1)
BUN SERPL-MCNC: 34 MG/DL (ref 7–18)
BUN/CREAT RATIO (CALC): 19.3 RATIO
CHLORIDE: 106 MMOL/L (ref 98–107)
CO2 (BICARBONATE): 26 MMOL/L (ref 21–32)
CORONAVIRUS 2019 SARS-COV-2: NEGATIVE
CREATININE: 1.76 MG/DL (ref 0.67–1.17)
EOSINOPHIL: 6.8 % (ref 0–7)
GLOBULIN (CALCULATION): 3.9 G/DL
GLUCOSE SERPL-MCNC: 128 MG/DL (ref 74–106)
HCT: 38.8 % (ref 42–52)
HGB BLD-MCNC: 12.4 G/DL (ref 13.2–18)
INFLUENZA A NAA: NEGATIVE
INR PPP: 1.11 (ref 0.9–1.2)
LYMPHOCYTE: 9.1 % (ref 15–48)
MCH RBC QN AUTO: 31 PG (ref 25–31)
MCHC RBC AUTO-ENTMCNC: 32 G/DL (ref 32–36)
MCV: 97 FL (ref 78–100)
MONOCYTE: 13.2 % (ref 0–12)
MPV: 10.5 FL (ref 6–9.5)
NEUTROPHIL: 69.5 % (ref 41–80)
NRBC: 0
PLT: 142 K/UL (ref 150–400)
POTASSIUM: 4.4 MMOL/L (ref 3.5–5.1)
PROTHROMBIN TIME: 13.7 SECONDS (ref 11.8–13.4)
PTT: 27.5 SECONDS (ref 24.4–34.7)
RBC MORPHOLOGY: NORMAL
RBC: 4 M/UL (ref 4.7–6)
RDW: 14 % (ref 11.5–14)
TOTAL PROTEIN: 7.2 G/DL (ref 6.4–8.2)
WBC: 7.1 K/UL (ref 4–10.5)

## 2022-03-16 PROCEDURE — 5A09357 ASSISTANCE WITH RESPIRATORY VENTILATION, LESS THAN 24 CONSECUTIVE HOURS, CONTINUOUS POSITIVE AIRWAY PRESSURE: ICD-10-PCS | Performed by: INTERNAL MEDICINE

## 2022-03-16 PROCEDURE — U0002 COVID-19 LAB TEST NON-CDC: HCPCS

## 2022-03-16 NOTE — TELEPHONE ENCOUNTER
Karyn called to report that she is not there, but receiving reports that her dad is wincing out in pain and complaining of abdominal pain.  See patient outreach from 3/15/22.  Advised to go to ER for evaluation.

## 2022-03-17 LAB
BASOPHIL: 0.6 % (ref 0–2)
BUN SERPL-MCNC: 30 MG/DL (ref 7–18)
BUN/CREAT RATIO (CALC): 18.9 RATIO
CHLORIDE: 109 MMOL/L (ref 98–107)
CO2 (BICARBONATE): 27 MMOL/L (ref 21–32)
CREATININE: 1.59 MG/DL (ref 0.67–1.17)
EOSINOPHIL: 9 % (ref 0–7)
GLUCOSE SERPL-MCNC: 85 MG/DL (ref 74–106)
HCT: 38.6 % (ref 42–52)
HGB BLD-MCNC: 12.2 G/DL (ref 13.2–18)
LYMPHOCYTE: 16.7 % (ref 15–48)
MCH RBC QN AUTO: 31 PG (ref 25–31)
MCHC RBC AUTO-ENTMCNC: 31.6 G/DL (ref 32–36)
MCV: 98.2 FL (ref 78–100)
MONOCYTE: 14.6 % (ref 0–12)
MPV: 10.7 FL (ref 6–9.5)
NEUTROPHIL: 58.3 % (ref 41–80)
NRBC: 0
PLT: 140 K/UL (ref 150–400)
POTASSIUM: 4 MMOL/L (ref 3.5–5.1)
RBC MORPHOLOGY: NORMAL
RBC: 3.93 M/UL (ref 4.7–6)
RDW: 13.8 % (ref 11.5–14)
WBC: 6.7 K/UL (ref 4–10.5)

## 2022-03-17 NOTE — NUR
3/17/22 Mr. Cummins lives at home with 24 hour caregivers. He has a rw, 3in1,
and s. chair. A referral was made to [facility] per choice of Richard Cummins, son.
Please notify [facility] at [phone] if patient discharges over the weekend. -
Please monitor for 02 needs.

## 2022-03-18 LAB
BASOPHIL: 0.1 % (ref 0–2)
BUN SERPL-MCNC: 43 MG/DL (ref 7–18)
BUN/CREAT RATIO (CALC): 21.4 RATIO
CHLORIDE: 106 MMOL/L (ref 98–107)
CO2 (BICARBONATE): 28 MMOL/L (ref 21–32)
CREATININE: 2.01 MG/DL (ref 0.67–1.17)
EOSINOPHIL: 0.1 % (ref 0–7)
GLUCOSE SERPL-MCNC: 122 MG/DL (ref 74–106)
HCT: 37.1 % (ref 42–52)
HGB BLD-MCNC: 11.9 G/DL (ref 13.2–18)
LYMPHOCYTE(M): 14 % (ref 15–48)
LYMPHOCYTE: 9.9 % (ref 15–48)
MCH RBC QN AUTO: 30.9 PG (ref 25–31)
MCHC RBC AUTO-ENTMCNC: 32.1 G/DL (ref 32–36)
MCV: 96.4 FL (ref 78–100)
MONOCYTE(M): 6 % (ref 0–12)
MONOCYTE: 12.4 % (ref 0–12)
MPV: 10.9 FL (ref 6–9.5)
NEUTROPHIL: 76.9 % (ref 41–80)
NEUTROPHILS(M): 77 % (ref 41–80)
NEUTS BAND NFR BLD: 3 % (ref 0–10)
NRBC: 0
PLATELET ESTIMATE: NORMAL
PLATELET MORPHOLOGY: NORMAL
PLT: 148 K/UL (ref 150–400)
POTASSIUM: 4.2 MMOL/L (ref 3.5–5.1)
RBC MORPHOLOGY: NORMAL
RBC: 3.85 M/UL (ref 4.7–6)
RDW: 13.7 % (ref 11.5–14)
TOTAL CELL COUNT: 100
WBC: 8.9 K/UL (ref 4–10.5)

## 2022-03-19 LAB
BASOPHIL: 0.2 % (ref 0–2)
BUN SERPL-MCNC: 55 MG/DL (ref 7–18)
BUN/CREAT RATIO (CALC): 28.2 RATIO
CHLORIDE: 108 MMOL/L (ref 98–107)
CO2 (BICARBONATE): 25 MMOL/L (ref 21–32)
CREATININE: 1.95 MG/DL (ref 0.67–1.17)
EOSINOPHIL: 0 % (ref 0–7)
GLUCOSE SERPL-MCNC: 166 MG/DL (ref 74–106)
HCT: 32.1 % (ref 42–52)
HGB BLD-MCNC: 10.5 G/DL (ref 13.2–18)
LYMPHOCYTE: 9.5 % (ref 15–48)
MCH RBC QN AUTO: 31.3 PG (ref 25–31)
MCHC RBC AUTO-ENTMCNC: 32.7 G/DL (ref 32–36)
MCV: 95.5 FL (ref 78–100)
MONOCYTE: 8.6 % (ref 0–12)
MPV: 10.6 FL (ref 6–9.5)
NEUTROPHIL: 81.2 % (ref 41–80)
NRBC: 0
PLT: 140 K/UL (ref 150–400)
POTASSIUM: 4.9 MMOL/L (ref 3.5–5.1)
RBC MORPHOLOGY: NORMAL
RBC: 3.36 M/UL (ref 4.7–6)
RDW: 14.2 % (ref 11.5–14)
WBC: 10 K/UL (ref 4–10.5)

## 2022-03-19 NOTE — OUTREACH NOTE
Prep Survey    Flowsheet Row Responses   [employer] facility patient discharged from? Non-BH   Is LACE score < 7 ? Non-BH Discharge   Emergency Room discharge w/ pulse ox? No   Eligibility [facility]    Date of Discharge 03/19/22   [place] [facility]   [place]charge diagnosis Unavailable    Does the patient have one of the following disease processes/diagnoses(primary or secondary)? Other   Prep survey completed? Yes          ANGELICA MARISCAL - Registered Nurse

## 2022-03-21 NOTE — OUTREACH NOTE
Call Center TCM Note    Flowsheet Row Responses   [facility] patient discharged from? Non-BH  [[facility]]   Does the patient have one of the following disease processes/diagnoses(primary or secondary)? Other   TCM attempt successful? Yes   Call start time 1053   Call end time 1101   Discharge diagnosis Pneumonia, Diverticulitis   Person spoke with today (if not patient) and relationship Karyn, daughter   Meds reviewed with patient/caregiver? Yes   Is the patient having any side effects they believe may be caused by any medication additions or changes? No   Does the patient have all medications ordered at discharge? Yes   Is the patient taking all medications as directed (includes completed medication regime)? Yes   Medication comments steroids, antibiotics prescribed at discharge    Does the patient have a primary care provider?  Yes   Does the patient have an appointment with their PCP within 7 days of discharge? Yes   Comments regarding PCP Patient has an appt on 4/1/22 @315pm--will route a message to determine if it can be revised for hospital f/u   Has the patient kept scheduled appointments due by today? N/A   What [facility]   Has home health visited the patient within 72 hours of discharge? Unsure   Psychosocial issues? No   Did the patient receive a copy of their discharge instructions? Yes   Nursing interventions Reviewed instructions with patient   What is the patient's perception of their health status since discharge? Improving  [Denies SOA, some chest pain/cough, afebrile.  Passed O2 walk test but dtr discusses some desats to 80's while sleeping at hospital, had to wear Bipap.  Sats at home 96%, 152/79 BP.]   Is the patient/caregiver able to teach back signs and symptoms related to disease process for when to call PCP? Yes   Is the patient/caregiver able to teach back signs and symptoms related to disease process for when to call 911? Yes   Is the patient/caregiver able to teach  back the hierarchy of who to call/visit for symptoms/problems? PCP, Specialist, Home health nurse, Urgent Care, ED, 911 Yes   Additional teach back comments Dtr reports patient is followed by CCM program and works with ACM on a weekly basis--   TCM call completed? Yes          Martine Vanessa RN    3/21/2022, 11:07 EDT

## 2022-03-23 NOTE — TELEPHONE ENCOUNTER
Amber Pagan RN called from [facility], requesting an order for Telehealth ( remotely monitor vitals - O2, weight, B/P and HR).

## 2022-03-25 NOTE — OUTREACH NOTE
AMBULATORY CASE MANAGEMENT NOTE    Name and Relationship of Patient/Support Person: Karyn Kitchen - Emergency Contact    Called Karyn to check on her dad's appointment with Dr. Gonzales.  They cancelled due to him not feeling well.  Rescheduled for June.  He is seeing Dr. Allen next Friday for follow up, will need f/u CT scan.       HELEN R  Ambulatory Case Management    3/25/2022, 09:20 EDT   CCM End of Month Documentation    This Chronic Medical Management Care Plan for Blaine Morales, 88 y.o. male, has been monitored and managed and a new plan of care implemented for the month of March.  A cumulative time of 57  minutes was spent on this patient record this month, including phone call with relative; electronic communication with primary care provider; electronic communication with pharmacist; chart review, Monitoring and managing care with assistance of daughter..    Regarding the patient's problems: has Acute pain of right shoulder; Trigger point of right shoulder region; Bilateral carotid artery stenosis; CKD (chronic kidney disease) stage 3, GFR 30-59 ml/min ([facility]); Arteriosclerosis of coronary artery; Alzheimer disease ([facility]); Hypertension; Hyperlipidemia; Abnormal weight loss; Angina pectoris ([facility]); Aortic aneurysm ([facility]); Aortic valve stenosis; Diverticulitis; Dyspnea; Echocardiogram abnormal; Edema; Gastric ulcer; Heart murmur; High prostate specific antigen (PSA); Type 2 diabetes mellitus (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); Obesity (BMI 30-39.9); PAD (peripheral artery disease) ([facility]); Peripheral nerve disease; Pneumonia; Presence of aortocoronary bypass graft; Presence of prosthetic heart valve; Right carotid artery occlusion; Stenosis of carotid artery; Stenosis of left subclavian artery ([facility]); Systolic murmur; Lung nodule; Pressure injury of left buttock, stage 1; History of smoking; and CHF (congestive heart failure) ([facility]) on their  problem list., the following items were addressed: medications; transitions to medical care, with multiple hospital visits, medication have been changed multiple times. and any changes can be found within the plan section of the note.  A detailed listing of time spent for chronic care management is tracked within each outreach encounter.  Current medications include:  has a current medication list which includes the following prescription(s): amlodipine, aspirin, atorvastatin, bumetanide, clopidogrel, donepezil, escitalopram, gabapentin, memantine, metoprolol succinate xl, multivitamin with minerals, ondansetron odt, promethazine, quetiapine, and tramadol. and the patient is reported to be patient is compliant with medication protocol, Compliant,  Medications are reported to be effective, medication has been helping the pvd..  Regarding these diagnoses, referrals were made to the following provider(s):  specialists.  All notes on chart for PCP to review.    The patient was monitored remotely for pain; medications; weight; activity level; mood & behavior, Monitors and reports abonormalities and changes..    The patient's physical needs include:  needs assistance with ADLs; help taking medications as prescribed; physician referral, AG has round the clock caregivers that provide assistance..     The patient's mental support needs include:  continued support, supportive.  Loss of wife, depression.    The patient's cognitive support needs include:  continued support; household care; personal care; supervision, Reminders provided and support given.    The patient's psychosocial support needs include:  continued support, Primarily homebound, [facility]    The patient's functional needs include: needs assistance for ADLs; physical healthcare; physician referral, some ADL's    The patient's environmental needs include:  no involvement in outside activities or no access to other activities, n/a    Care Plan  overall comments:  Monitoring and managing health and other care providers.    Refer to previous outreach notes for more information on the areas listed above.    Monthly Billing Diagnoses  (N18.32) Stage 3b chronic kidney disease (HCC)    (I50.9) Chronic congestive heart failure, unspecified heart failure type (HCC)    (G30.9,  F02.80) Alzheimer disease (HCC)    Medications   · Medications have been reconciled    Care Plan progress this month:      Recently Modified Care Plans Updates made since 2/22/2022 12:00 AM     Dementia (Adult)         Problem Priority Last Modified     ELS BEHAVIORAL SYMPTOMS (ADULT) --  3/15/2022  4:16 PM by Naomy Brunson, RN              Goal Recent Progress Last Modified     Behavior Symptoms Management --  3/15/2022  4:16 PM by Naomy Brunson RN     Evidence-based guidance:   Assess for behavior changes by interviewing patient and family/caregiver (informant) using a standardized tool when possible.   Assess for signs/symptoms of underlying condition such as urinary tract infection, unmanaged pain or side effects of pharmacologic therapy when new or worsening agitation appears.   Identify triggers or exacerbating factors such as environmental changes, medication, depression or psychosis.   If behavior is dangerous to patient or family/caregiver and cannot be controlled, consider pharmacologic management, professional in-home care or hospitalization.   Prepare patient and family/caregiver for time-limited pharmacologic therapy that is prescribed only when behavior symptoms are severe and after careful assessment of the risks, benefits and type of dementia.   Monitor efficacy of pharmacologic therapy that may include antipsychotic for aggression, psychosis and agitation or SSRI (selective serotonin reuptake inhibitor) for mood or depressive symptoms.   When pharmacologic therapy has been tapered, assess symptoms monthly for at least 4 months after discontinuation to identify recurrence  of symptoms.   Use nonpharmacologic approaches to manage behavior including referral to mental health provider for behavior therapy and psychoeducation for patient and caregiver.   Encourage use of complementary therapy directed at behavior and mood management such as aromatherapy, muscle-relaxation training, massage or therapeutic touch, animal-assisted therapy and music therapy.   When pain is present, mutually develop an interdisciplinary and multimodal pain management plan with patient and family/caregiver; track the patient's response to the pain management plan.   Initiate individualized nonpharmacologic pain management measures that may include physical rehabilitation, cognitive behavior therapy, guided imagery, massage, distraction, yoga, relaxation or chiropractic manipulation.   Encourage use of local anesthetic or analgesic therapy as an adjunct for pain control such as lidocaine patch, capsaicin cream or topical nonsteroidal anti-inflammatory agent.   Prepare patient for use of pharmacologic therapy in a stepped approach that may include acetaminophen, nonsteroidal anti-inflammatory, opioid, antiepileptic or antidepressant.   Review efficacy, tolerability and adherence of pharmacologic therapy; manage medication-induced side effects.    Notes:              Task Due Date Last Modified     Develop Strategies to Manage Behavior --  3/15/2022  4:17 PM by Naomy Brunson RN     Care Management Activities:      - complementary therapy use encouraged  - medication list reviewed      Notes:                Problem Priority Last Modified     ELS CAREGIVER STRESS (ADULT) --  3/15/2022  4:16 PM by Naomy Brunson RN              Goal Recent Progress Last Modified     Caregiver Coping Optimized --  3/15/2022  4:16 PM by Naomy Brunson RN     Evidence-based guidance:   Recognize and validate the complex nature of dementia, as well as the impact on the caregiver and extended family; provide education and support to  align with needs and stage of dementia.   Acknowledge feelings of grief associated with dementia such as loss of relationship, recreational activities, future with patient and loss associated with out-of-home placement.   Encourage verbalization of feelings without ruminating.   Discuss services that may help balance the caregiving role and living the life he/she chooses such as professionally or peer-led support groups, web-based support, counseling and services such as respite care or in-home help.   Refer for or provide psychoeducation activities such as cognitive reframing to improve family/caregiver quality of life, wellbeing, confidence, perception of burden, mental health and self-efficacy.   Encourage use of individualized coping strategies that may include yoga, spiritual support, distraction, exercise, relaxation, music, massage, music therapy and outdoor activities to utilize nature's restorative properties.   Suggest home-monitoring system that may reduce family/caregiver worry and improve sleep.   Provide proactive acknowledgement of potential for depressive symptoms to appear; normalize response to burden of caregiving; refer to primary care provider or for mental health services.    Notes:              Task Due Date Last Modified     Recognize and Manage Caregiver Stress --  3/15/2022  4:18 PM by Naomy Brunson RN     Care Management Activities:      - active listening utilized  - caregiver stress acknowledged  - healthy lifestyle promoted      Notes:                Problem Priority Last Modified     ELS COGNITIVE FUNCTION (ADULT) --  3/15/2022  4:16 PM by Naomy Brunson RN              Goal Recent Progress Last Modified     Optimal Cognitive Function --  3/15/2022  4:16 PM by Naomy Brunson RN     Evidence-based guidance:   Assess cognitive function using a standardized tool every 6 months or as condition changes.   Perform medication review to identify those that may impact cognitive function  such as anticholinergic, opiate, benzodiazepine, digoxin, tricyclic antidepressant, skeletal muscle relaxant or antiepileptic medication.   Consider presence of hypotension or hypoglycemia because of intensive treatment for hypertension and diabetes as contributors to cognitive decline.   Screen for depression using a standardized tool such as the Geriatric Depression Scale.   Optimize visual and auditory function; refer for eye and hearing exam and advocate for treatment that may include eyeglasses, hearing aid or cataract surgery; refer for or provide occupational therapy evaluation.   Provide or refer for individual or group cognitive stimulation therapy, reminiscence therapy, cognitive training or rehabilitation, which may provide some benefit with memory loss and cognition in early stages of dementia.   Encourage social relationships and physical activity based on ability.   Promote strategies that will preserve the individual's abilities; focus on strengths and quality of life optimization.   Prepare patient and caregiver for introduction of pharmacologic therapy that may include cholinesterase inhibitor or NMDA (N-methyl-d-aspartate) receptor antagonist.   Monitor for and address medication side effects such as anorexia, weight loss, syncope, bradycardia, falls and fractures.    Notes:              Task Due Date Last Modified     Facilitate Optimal Mental Processes --  3/15/2022  4:18 PM by Naomy Brunson RN     Care Management Activities:      - barriers to social activities identified  - medication list reviewed      Notes:                Problem Priority Last Modified     ELS MOOD AND EMOTION SYMPTOMS (ADULT) --  3/15/2022  4:16 PM by Naomy Brunson RN              Goal Recent Progress Last Modified     Mood and Emotion Symptoms Managed --  3/15/2022  4:16 PM by Naomy Brunson RN     Evidence-based guidance:   Assess and monitor for mood or psychotic symptoms.   Provide psychoeducation and supportive  therapy interventions to improve mood and emotions including reality orientation, memory strategies, cognitive reframing and reminiscence therapy.   Suggest memory-stimulating activities that will improve mood such as creating a memory box to fill with photos and meaningful mementos.   Suggest triggering memories using smell (favorite perfume), touch (soft toy, sand, modeling catrachita/compound), taste (favorite food or drink), sound (favorite music, nature sounds) and sight (scrapbook, photos).   Explore common risk factors for depression, anxiety or posttraumatic stress such as personal or family history of depression and poor social support.   Monitor for signs or report of unexpected or rapid change in mood, cognitive deterioration, disinhibition, agitation, anxiety or aggression, which may be the only indicators of superimposed depression.   Assess and monitor patient's level of emotional distress; consider using a standardized and validated tool or DSM-5 criteria for major depression.   Assess for depression and risk for suicide or self-harm at intervals based on risk and presentation.   Explore compassionately, yet directly, self-harm or suicidal tendencies by asking about suicidal ideation, attempt history, family history and history of self-harming behavior.   Make immediate arrangements for evaluation at emergency department, community mental health agency or other psychiatric service when patient expresses positive suicidal ideation with a plan and access to lethal means.   Provide anticipatory guidance to remove lethal medication and firearms from home.   Ensure adequate supervision is provided to monitor for increasing risk factors; provide emergency contact information and instructions.   Assess for organic causes of depression such as hypothyroidism, cerebrovascular disease, stroke, delirium, alcohol withdrawal or effects of medications.   Provide or refer for cognitive behavioral therapy, supportive  "psychotherapy and psychoeducation; prepare patient for use of pharmacologic therapy to treat depression or anxiety.   Promote use of complementary therapy such as music therapy, recreational activity, exercise, singing, social contact and touch such as handholding.   For difficult to treat depression, consider referral to psychogeriatrician    Notes:              Task Due Date Last Modified     Develop Strategies to Manage Mood and Emotion --  3/15/2022  4:16 PM by Naomy Brunson, RN     Care Management Activities:      - not discussed during this outreach      Notes:              Problem Priority Last Modified     ELS PSYCHOSOCIAL ADJUSTMENT TO DEMENTIA (ADULT) --  3/15/2022  4:16 PM by Naomy Brunson, RN              Goal Recent Progress Last Modified     Optimal Coping --  3/15/2022  4:16 PM by Naomy Brunson, RN     Evidence-based guidance:   Individualize delivery of diagnosis; identify barriers to understanding including degree of cognitive or emotional decline.   Balance sensitivity with clarity of information to reduce confusion and anxiety.   Explore the patient and family/caregiver's understanding of the disease at intervals; reinforce information as often as needed.   Use open-ended questions to encourage patient and family/caregiver to share what is important to them.   Include family/caregiver in counseling and education sessions; focus on emotional support, as well as preparation for the medical and psychosocial challenges ahead.   Acknowledge and normalize feelings of disempowerment, fear, frustration, diminished self-worth and withdrawal.   Acknowledge the importance and difficulty for both the patient and family/caregiver in coming to terms with living with dementia.   Support adjustment to  €œnew normal\" with focus on maintaining daily life as closely as possible to life before dementia diagnosis.   Express empathy; listen actively by encouraging the patient and family/caregiver to express " feelings, concerns and fears; ask questions and encourage open communication regarding embarrassing or disturbing topics.   Assess for factors that may impact coping or adjustment such as a pre-existing mental health condition, prognosis, lack of social support and degree of cognitive or functional decline.   Discuss confidentiality at the time of diagnosis, as well as periodically during care; include rare circumstances such as driving or other identified safety risk when this might be breached.    Notes:              Task Due Date Last Modified     Support Psychosocial Adjustment to Dementia Diagnosis --  3/15/2022  4:19 PM by Naomy Brunson RN     Care Management Activities:      - active listening utilized  - caregiver support provided      Notes:                Problem Priority Last Modified     ELS SOCIAL AND FUNCTIONAL SYMPTOMS (ADULT) --  3/15/2022  4:16 PM by Naomy Brunson RN              Goal Recent Progress Last Modified     Social and Functional Skills Optimized --  3/15/2022  4:16 PM by Naomy Brunson RN     Evidence-based guidance:   Assess level of social support; promote maintaining links with family, friends and community to reduce social isolation.   Assess level of function related to basic activities of daily living that include eating, dressing, bathing, as well as instrumental activities of daily living such as shopping, managing finances and use of devices.   Encourage continuation of daily life components such as self-care, home maintenance, financial management, volunteer activities, education opportunities and hobbies.   Refer to occupational or physical therapy to develop comprehensive rehabilitation plan to improve or maintain activities of daily living; consider inclusion of endurance, balance and resistance-training.   Consider complementary therapy such as yoga, music, gardening, outdoor activities, aromatherapy and rodolfo chi.    Notes:              Task Due Date Last Modified      Facilitate Optimal Social and Functional Skills --  3/15/2022  4:19 PM by Helen Brunson RN     Care Management Activities:      - not discussed during this outreach      Notes:                          Instructions   · Patient was provided an electronic copy of care plan  · CCM services were explained and offered and patient has accepted these services.  · Patient has given their written consent to receive CCM services and understands that this includes the authorization of electronic communication of medical information with the other treating providers.  · Patient understands that they may stop CCM services at any time and these changes will be effective at the end of the calendar month and will effectively revocate the agreement of CCM services.  · Patient understands that only one practitioner can furnish and be paid for CCM services during one calendar month.  Patient also understands that there may be co-payment or deductible fees in association with CCM services.  · Patient will continue with at least monthly follow-up calls with the Nurse Navigator.    HELEN GARZA  Ambulatory Case Management    3/25/2022, 09:20 EDT

## 2022-04-01 PROBLEM — R93.89 ABNORMAL CT OF THE CHEST: Status: ACTIVE | Noted: 2022-01-01

## 2022-04-01 NOTE — PROGRESS NOTES
Chief Complaint  Abdominal Pain (Back and side pain, started today ), Hypertension, Hyperlipidemia, and Diabetes and transition of care from recent hospitalization.  No phone contact was established within 48 hours of discharge and he is being seen on day 13 post discharge    Subjective          Blaine Morales presents to [facility]  History of Present Illness  The patient is accompanied by his son Juan C.     The patient states that he is feeling well. He is not having any difficulty breathing. He is not on oxygen at home. He denies any cough or fever.     The patient's blood pressure has been well managed according to his log.     Three weeks ago the patient was hospitalized for 2 nights and diagnosed with possible pneumonia and diverticulitis (I think maybe Juan C meant bronchiolitis) .  The CT scan of the chest during hospitalization revealed right hilar and subcarinal lymphadenopathy.   He was instructed to follow-up for another CT scan, PET scan or with a pulmonologist.      The patient was seen by cardiologist Dr. Hernandez Spicer on 03/11/2022. He was instructed to reduce his fluid intake. His son reports that they are having a hard time limiting his fluids due to his constipation.     The patient denies any urinary frequency or hematuria.     At the hospital he was prescribed Bumex 2 mg 1 tablet daily.     The patient's last COVID-19 booster was in 12/2021. He is interested in getting the forth booster.     The patient has 24 hour caregivers who manage his medications. His son reports that his older brother will take him for walks, weather depending. He will mostly walk around the house or to the mail box. He does get dizzy when he walks and does need assistance.     Appt Vasc surg Dr. Claudia Gonzales June 22, and will get follow-up vascular study on the same day.            Current Outpatient Medications   Medication Sig Dispense Refill   • amLODIPine (NORVASC) 2.5 MG  tablet Take 1 tablet by mouth Every Night. 28 tablet 2   • aspirin (aspirin) 81 MG EC tablet Take 1 tablet by mouth Daily. 28 tablet 2   • atorvastatin (LIPITOR) 80 MG tablet Take 1 tablet by mouth Daily. 84 tablet 0   • clopidogrel (PLAVIX) 75 MG tablet Take 75 mg by mouth Daily.     • donepezil (ARICEPT) 10 MG tablet Take 1 tablet by mouth Daily. 90 tablet 0   • escitalopram (LEXAPRO) 5 MG tablet Take 1 tablet by mouth Daily. 90 tablet 0   • memantine (NAMENDA) 10 MG tablet Take 1 tablet by mouth 2 (Two) Times a Day. 180 tablet 0   • metoprolol succinate XL (TOPROL-XL) 25 MG 24 hr tablet Take 1 tablet by mouth Daily. 84 tablet 0   • ondansetron ODT (ZOFRAN-ODT) 4 MG disintegrating tablet Place 4 mg on the tongue Every 8 (Eight) Hours As Needed.     • promethazine (PHENERGAN) 12.5 MG tablet Take 12.5 mg by mouth Every 6 (Six) Hours As Needed.     • QUEtiapine (SEROquel) 25 MG tablet Take 0.5 tablets by mouth Every Night. 14 tablet 2   • bumetanide (BUMEX) 2 MG tablet Take 1 tablet by mouth Daily. 30 tablet 0   • gabapentin (NEURONTIN) 300 MG capsule Take 1 capsule by mouth Daily. 14 capsule 0   • multivitamin with minerals tablet tablet Take 1 tablet by mouth Daily. 100 tablet 0   • traMADol (ULTRAM) 50 MG tablet TAKE ONE TABLET BY MOUTH EVERY 6 HOURS AS NEEDED FOR MODERATE TO SEVERE PAIN 30 tablet 0     No current facility-administered medications for this visit.       Review of Systems   Constitutional: Negative for activity change, appetite change, chills, diaphoresis, fatigue, fever, unexpected weight gain and unexpected weight loss.   HENT: Negative for ear discharge, ear pain, mouth sores, nosebleeds, sinus pressure, sneezing and sore throat.    Eyes: Negative for pain, discharge and itching.   Respiratory: Negative for cough, chest tightness, shortness of breath and wheezing.    Cardiovascular: Negative for chest pain, palpitations and leg swelling.   Gastrointestinal: Negative for abdominal pain,  "constipation, diarrhea, nausea and vomiting.   Endocrine: Negative for heat intolerance, polydipsia and polyphagia.   Genitourinary: Negative for dysuria, flank pain, frequency, hematuria and urgency.   Musculoskeletal: Negative for arthralgias, back pain, gait problem, myalgias, neck pain and neck stiffness.   Skin: Negative for color change, pallor and rash.   Neurological: Negative for seizures, speech difficulty and numbness.   Psychiatric/Behavioral: Negative for agitation, decreased concentration and sleep disturbance. The patient is not nervous/anxious.             Objective   Vital Signs:   /71 (BP Location: Left arm, Patient Position: Lying)   Pulse 73   Temp 98.1 °F (36.7 °C)   Ht 182.9 cm (72.01\")   Wt 84.8 kg (187 lb)   SpO2 97%   BMI 25.36 kg/m²     Physical Exam  Constitutional:       Appearance: Normal appearance.   Neck:      Vascular: No carotid bruit.   Cardiovascular:      Rate and Rhythm: Normal rate and regular rhythm.      Heart sounds: Murmur (RUSB radiate to carotid) heard.    Systolic murmur is present.  Pulmonary:      Effort: No respiratory distress.      Breath sounds: No wheezing or rales.   Musculoskeletal:      Right lower leg: No edema.      Left lower leg: No edema.   Lymphadenopathy:      Cervical: No cervical adenopathy.   Neurological:      General: No focal deficit present.      Mental Status: He is alert.   Psychiatric:         Attention and Perception: Attention normal.         Mood and Affect: Mood normal.         Speech: Speech normal.            Result Review :                     Assessment and Plan    Diagnoses and all orders for this visit:    1. Alzheimer disease (HCC) (Primary)  Assessment & Plan:  Continue with the supportive care that he is receiving.  Very pleased discussed the 24 caregivers      2. Mixed hyperlipidemia  -     Comprehensive Metabolic Panel; Future  -     Lipid Panel; Future    3. Primary hypertension  Assessment & Plan:  Blood pressure " looks okay continue current regimen    Orders:  -     CBC & Differential; Future  -     Comprehensive Metabolic Panel; Future    4. Type 2 diabetes mellitus with other circulatory complication, without long-term current use of insulin (HCC)  -     Hemoglobin A1c; Future    5. Stage 3b chronic kidney disease (HCC)  -     Comprehensive Metabolic Panel; Future    6. 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 Chronic combined systolic and diastolic congestive heart failure (HCC)  -     bumetanide (BUMEX) 2 MG tablet; Take 1 tablet by mouth Daily.  Dispense: 30 tablet; Refill: 0  -     BNP; Future    8. Abnormal CT of the chest  Assessment & Plan:  He has had some abnormalities on his chest CT in the past.  Thank you would be good to get pulmonology to review and see if they recommend he pursue further evaluations.    Orders:  -     Ambulatory Referral to Pulmonology    9. Need for vaccination  -     COVID-19 Vaccine (Pfizer) Gray Cap    10. PAD (peripheral artery disease) ([facility])  Comments:  He has the follow-up with Dr. Gonzales schedule      Follow Up   Return in about 3 months (around 7/1/2022).  Patient was given instructions and counseling regarding his condition or for health maintenance advice. Please see specific information pulled into the AVS if appropriate.     Transcribed from ambient dictation for Ankur Allen MD by Sally Yun.  04/01/22   16:44 EDT    Patient verbalized consent to the visit recording.

## 2022-04-02 NOTE — ASSESSMENT & PLAN NOTE
He has had some abnormalities on his chest CT in the past.  Thank you would be good to get pulmonology to review and see if they recommend he pursue further evaluations.

## 2022-04-06 NOTE — TELEPHONE ENCOUNTER
[facility] sent refill request on pts Plavix.  It was originally written on 3/19/21 for a 90 day prescription, Agustina states that they pulled the refill from [facility] from last year.  Im unsure if pt still needs to be on this or not.  Shante had a conversation with them informing them that its dangerous to pull a year old rx for a blood thinner and filling it for the pt.  Please advise if pt needs to still be on it or not

## 2022-04-11 NOTE — TELEPHONE ENCOUNTER
I was able to access EMDs today and viewed log note dated 3/19/2021 - discussion between MD/me about Plavix and MD ok'd medication.  RN sent refill to [facility] in [place], and that was where transfer took place.  Now need refill again.  Medication pended - see refill encounter 4/11/22

## 2022-04-12 ENCOUNTER — HOSPITAL ENCOUNTER (EMERGENCY)
Dept: HOSPITAL 49 - FER | Age: 87
LOS: 1 days | Discharge: HOME | End: 2022-04-13
Payer: MEDICARE

## 2022-04-12 DIAGNOSIS — Z88.2: ICD-10-CM

## 2022-04-12 DIAGNOSIS — I50.9: ICD-10-CM

## 2022-04-12 DIAGNOSIS — Z20.822: ICD-10-CM

## 2022-04-12 DIAGNOSIS — H57.12: ICD-10-CM

## 2022-04-12 DIAGNOSIS — H54.7: Primary | ICD-10-CM

## 2022-04-12 DIAGNOSIS — Z79.02: ICD-10-CM

## 2022-04-12 DIAGNOSIS — Z88.0: ICD-10-CM

## 2022-04-12 DIAGNOSIS — I11.0: ICD-10-CM

## 2022-04-12 DIAGNOSIS — Z79.899: ICD-10-CM

## 2022-04-12 LAB
ALBUMIN SERPL-MCNC: 3 G/DL (ref 3.4–5)
ALKALINE PHOSHATASE: 83 U/L (ref 46–116)
ALT SERPL-CCNC: 27 U/L (ref 16–63)
AST: 21 U/L (ref 15–37)
BASOPHIL: 1.1 % (ref 0–2)
BILIRUBIN - TOTAL: 0.3 MG/DL (ref 0.2–1)
BUN SERPL-MCNC: 24 MG/DL (ref 7–18)
BUN/CREAT RATIO (CALC): 17.8 RATIO
CHLORIDE: 104 MMOL/L (ref 98–107)
CO2 (BICARBONATE): 30 MMOL/L (ref 21–32)
CREATININE: 1.35 MG/DL (ref 0.67–1.17)
EOSINOPHIL: 12 % (ref 0–7)
GLOBULIN (CALCULATION): 4 G/DL
GLUCOSE SERPL-MCNC: 103 MG/DL (ref 74–106)
HCT: 37.3 % (ref 42–52)
HGB BLD-MCNC: 12.2 G/DL (ref 13.2–18)
LYMPHOCYTE: 19.8 % (ref 15–48)
MCH RBC QN AUTO: 31.1 PG (ref 25–31)
MCHC RBC AUTO-ENTMCNC: 32.7 G/DL (ref 32–36)
MCV: 95.2 FL (ref 78–100)
MONOCYTE: 11.4 % (ref 0–12)
MPV: 10.1 FL (ref 6–9.5)
NEUTROPHIL: 54.9 % (ref 41–80)
NRBC: 0
PLT: 187 K/UL (ref 150–400)
POTASSIUM: 3.9 MMOL/L (ref 3.5–5.1)
RBC MORPHOLOGY: NORMAL
RBC: 3.92 M/UL (ref 4.7–6)
RDW: 14.1 % (ref 11.5–14)
TOTAL PROTEIN: 7 G/DL (ref 6.4–8.2)
WBC: 6.4 K/UL (ref 4–10.5)

## 2022-04-12 PROCEDURE — U0002 COVID-19 LAB TEST NON-CDC: HCPCS

## 2022-04-12 NOTE — OUTREACH NOTE
AMBULATORY CASE MANAGEMENT NOTE    Name and Relationship of Patient/Support Person:  -     Karyn called to report that they were not actually seeing Hernandez Spicer today, just labs.      She reports that on the way back while in the car at [facility], that he yelled out in wincing pain with sharp pain over his left eye, lost vision for maybe 2 seconds.  His speech was not altered, recovered right afterward. Karyn did give him 2 tylenol and helped the pain.  He is having no neurological deficits. Smile is equal, strength equal, speech normal, pupils equal, no signs or symptoms of stroke.  He calmed down and now his behaviour is totally appropriate and laughing and cutting up.  She states that if she were to send him to the ER, they would think she is crazy.  She is going to continue to monitor him and if any changes, will seek immediate care.  Karyn thinks that this was probably related to a vascular issue.       HELEN R  Ambulatory Case Management    4/12/2022, 11:44 EDT   AMBULATORY CASE MANAGEMENT NOTE    Name and Relationship of Patient/Support Person: Karyn Kitchen W - Emergency Contact    Labs reviewed and kidney function stable.       HELEN R  Ambulatory Case Management    4/13/2022, 11:10 EDT

## 2022-04-21 NOTE — OUTREACH NOTE
AMBULATORY CASE MANAGEMENT NOTE    Name and Relationship of Patient/Support Person: Karyn Kitchen W - Emergency Contact       Minutes of time spent in patient care, reviewing records prior to the encounter, discussing with patient, entering orders and documentation.     Karyn called to let me know that her dad did have to go to the ER last week, CT/MRI brain.  Told that he has had an old stroke.  Does not look like it was compared to MRI 2021.    Mr. Morales is scheduled to see opthalmology Monday - Dr. Freitas at 1:00pm.   He is scheduled for pulmonology on Tuesday, and his CT on Wednesday.  His CT needs to be before the pulmonology appointment.  Called CT at [facility] and there are appointments available tomorrow.  Karyn was agreeable.  Called scheduling to attempt to change to tomorrow, Karyn will need to bring disk from [place].    Education Documentation  No documentation found.        HELEN GARZA  Ambulatory Case Management    4/21/2022, 11:58 EDT

## 2022-04-25 NOTE — OUTREACH NOTE
AMBULATORY CASE MANAGEMENT NOTE    Name and Relationship of Patient/Support Person: ImtiazKaryn W - Emergency Contact    Karyn called with results of CT scan.  Informed her that the disk from [facility] was not compared but I have reached out to radiology to have done.  Appointment tomorrow with pulmonologist, will continue to follow.  PET scan scheduled in [facility] needs to be moved to [facility] for convenience.      Karyn states that he has a weight gain over the weekend and some swelling, but today is the day he takes his Bumex.  LIANA Best has his on an every other day regimen possibly but he is regulating the Bumex dose.  He is being monitored by Telehealth for this also.      Education Documentation  Weight Monitoring, taught by Helen Brunson, RN at 4/25/2022 10:42 AM.  Learner: Caregiver  Readiness: Acceptance  Method: Explanation  Response: Verbalizes Understanding        HELEN GARZA  Ambulatory Case Management    4/25/2022, 10:43 EDT

## 2022-04-26 NOTE — PROGRESS NOTES
Pulmonary Consultation    Ankur Allen MD,    Thank you for asking me to see Blaine Morales for   Chief Complaint   Patient presents with   • Establish Care     New Patient    • Lung Nodule    • Shortness of Breath   .      History of Present Illness  Blaine Morales is a 88 y.o. male with a PMH significant for dementia aortic stenosis chronic renal insufficiency and diabetes mellitus with history of right lung nodule presents for evaluation patient does complain of shortness of breath off and on but denies any hemoptysis or chest pain at this time he is a former smoker       Tobacco use history: Former smoker    Review of Systems: History obtained from chart review and the patient.  Review of Systems   Respiratory: Positive for shortness of breath.    All other systems reviewed and are negative.    As described in the HPI. Otherwise, remainder of ROS (14 systems) were negative.    Patient Active Problem List   Diagnosis   • Acute pain of right shoulder   • Trigger point of right shoulder region   • Bilateral carotid artery stenosis   • CKD (chronic kidney disease) stage 3, GFR 30-59 ml/min ([facility])   • Arteriosclerosis of coronary artery   • Alzheimer disease ([facility])   • Hypertension   • Hyperlipidemia   • Abnormal weight loss   • Aortic aneurysm ([facility])   • Aortic valve stenosis   • Diverticulitis   • Dyspnea   • Echocardiogram abnormal   • Edema   • Gastric ulcer   • Heart murmur   • High prostate specific antigen (PSA)   • Type 2 diabetes mellitus ([facility])   • Hyperkalemia   • 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 ([facility])   • Obesity (BMI 30-39.9)   • PAD (peripheral artery disease) ([facility])   • Peripheral nerve disease   • Pneumonia   • Presence of aortocoronary bypass graft   • Presence of prosthetic heart valve   • Right carotid artery occlusion   • Stenosis of carotid artery   • Stenosis of left subclavian artery ([facility])   • Systolic murmur   • Lung nodule   • Pressure  injury of left buttock, stage 1   • History of smoking   • CHF (congestive heart failure) (HCC)   • Abnormal CT of the chest         Current Outpatient Medications:   •  amLODIPine (NORVASC) 2.5 MG tablet, Take 1 tablet by mouth Every Night., Disp: 28 tablet, Rfl: 2  •  aspirin (aspirin) 81 MG EC tablet, Take 1 tablet by mouth Daily., Disp: 28 tablet, Rfl: 2  •  atorvastatin (LIPITOR) 80 MG tablet, Take 1 tablet by mouth Daily., Disp: 84 tablet, Rfl: 0  •  bumetanide (BUMEX) 2 MG tablet, Take 1 tablet by mouth Daily., Disp: 30 tablet, Rfl: 0  •  clopidogrel (PLAVIX) 75 MG tablet, Take 1 tablet by mouth Daily., Disp: 90 tablet, Rfl: 0  •  donepezil (ARICEPT) 10 MG tablet, Take 1 tablet by mouth Daily., Disp: 90 tablet, Rfl: 0  •  escitalopram (LEXAPRO) 5 MG tablet, Take 1 tablet by mouth Daily., Disp: 90 tablet, Rfl: 0  •  memantine (NAMENDA) 10 MG tablet, Take 1 tablet by mouth 2 (Two) Times a Day., Disp: 180 tablet, Rfl: 0  •  metoprolol succinate XL (TOPROL-XL) 25 MG 24 hr tablet, Take 1 tablet by mouth Daily., Disp: 84 tablet, Rfl: 0  •  ondansetron ODT (ZOFRAN-ODT) 4 MG disintegrating tablet, Place 4 mg on the tongue Every 8 (Eight) Hours As Needed., Disp: , Rfl:   •  promethazine (PHENERGAN) 12.5 MG tablet, Take 12.5 mg by mouth Every 6 (Six) Hours As Needed., Disp: , Rfl:   •  QUEtiapine (SEROquel) 25 MG tablet, Take 0.5 tablets by mouth Every Night., Disp: 14 tablet, Rfl: 2  •  multivitamin with minerals tablet tablet, Take 1 tablet by mouth Daily., Disp: 100 tablet, Rfl: 0    Allergies   Allergen Reactions   • Hydrocodone-Acetaminophen Nausea And Vomiting     hydrocodone/apap 5-325mg   • Penicillins Unknown - Low Severity   • Cefaclor Rash   • Penicillin G Unknown - Low Severity   • Sulfa Antibiotics Unknown - Low Severity       Past Medical History:   Diagnosis Date   • Abdominal aortic aneurysm without rupture (HCC)    • Acute bronchitis, unspecified    • Adjustment disorder with mixed anxiety and  depressed mood    • Alzheimer disease ([facility])    • Alzheimer's disease with late onset ([facility])    • 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    • Atherosclerosis of native arteries of extremities with intermittent claudication, right leg ([facility])    • Cardiac arrhythmia, unspecified    • Causalgia of right lower limb    • Cervicalgia    • Chronic kidney disease, stage 3 ([facility])     MODERATE   • Chronic kidney disease, stage 3b ([facility])    • Diabetes ([facility])    • Dizziness and giddiness    • Dyspnea, unspecified    • Edema, unspecified    • Essential (primary) hypertension    • Fecal impaction ([facility])    • Gout, unspecified    • Hallucinations, unspecified    • History of falling    • History of pneumonia     HOSPITALIZATION AT [facility]   • Idiopathic chronic gout, unspecified site, without tophus (tophi)    • Localized edema    • Male erectile dysfunction, unspecified    • Mixed hyperlipidemia    • Myocardial infarct ([facility]) 01/2021    ADMITTED ONCE   • Nonrheumatic aortic (valve) stenosis    • Other long term (current) drug therapy    • Other symptoms and signs involving cognitive functions and awareness    • Pain in right shoulder    • Presence of prosthetic heart valve    • Rash and other nonspecific skin eruption    • Skin cancer (melanoma) ([facility])    • Slow transit constipation    • Syncope and collapse    • Type 2 diabetes mellitus without complications ([facility])      Past Surgical History:   Procedure Laterality Date   • ANGIOPLASTY  06/2020    SFA POP ANGIOPLASTY WITH STENTING   • CARDIAC VALVE REPLACEMENT  10/2015   • CAROTID ENDARTERECTOMY Bilateral 1980s   • CORONARY ARTERY BYPASS GRAFT  10/2015   • SKIN CANCER EXCISION      MELANOMA; FROM MID BACK -DR ZARATE   • SUBCLAVIAN ARTERY STENT  10/2015     Social History     Socioeconomic History   • Marital status:    Tobacco Use   • Smoking status: Former Smoker     Packs/day: 1.00     Years: 47.00     Pack years: 47.00     Types: Cigarettes      "Start date:      Quit date:      Years since quittin.3   • Smokeless tobacco: Never Used   Vaping Use   • Vaping Use: Never used   Substance and Sexual Activity   • Alcohol use: Not Currently     Comment: RARE   • Drug use: Never   • Sexual activity: Defer     Family History   Problem Relation Age of Onset   • Dislocations Other    • Hypertension Other    • Stroke Mother         CAUSE OF DEATH   • Heart attack Father         CAUSE OF DEATH   • Cancer Brother         UNSPECIFIED TYPE   • Stroke Brother 75          Objective     Blood pressure 152/78, pulse 65, temperature 97.8 °F (36.6 °C), temperature source Temporal, resp. rate 19, height 180.3 cm (71\"), weight 83.9 kg (185 lb), SpO2 91 %.  Physical Exam  Vitals and nursing note reviewed.   Constitutional:       Appearance: Normal appearance.   HENT:      Head: Normocephalic and atraumatic.      Nose: Nose normal.      Mouth/Throat:      Mouth: Mucous membranes are moist.   Eyes:      Extraocular Movements: Extraocular movements intact.      Pupils: Pupils are equal, round, and reactive to light.   Cardiovascular:      Rate and Rhythm: Normal rate and regular rhythm.      Pulses: Normal pulses.      Heart sounds: Normal heart sounds.   Pulmonary:      Effort: Pulmonary effort is normal.      Comments: Breath sounds diminished right lung base  Abdominal:      General: Abdomen is flat. Bowel sounds are normal.      Palpations: Abdomen is soft.   Musculoskeletal:         General: Normal range of motion.      Cervical back: Normal range of motion and neck supple.   Skin:     General: Skin is warm.      Capillary Refill: Capillary refill takes less than 2 seconds.   Neurological:      General: No focal deficit present.      Mental Status: He is alert and oriented to person, place, and time.   Psychiatric:         Mood and Affect: Mood normal.         Behavior: Behavior normal.       Immunization History   Administered Date(s) Administered   • COVID-19 " (MODERNA) 1st, 2nd, 3rd Dose Only 06/01/2021, 06/01/2021, 06/29/2021, 06/29/2021, 12/14/2021   • Covid-19 (Pfizer) Gray Cap 04/01/2022   • Fluzone High Dose =>65 Years ([employer] ONLY) 11/03/2017   • Fluzone High-Dose 65+yrs 11/03/2021   • Influenza, Unspecified 10/28/2020, 10/28/2020   • Pneumococcal Conjugate 13-Valent (PCV13) 06/30/2015, 06/30/2015, 02/03/2021, 02/03/2021, 02/03/2021   • Pneumococcal Polysaccharide (PPSV23) 01/25/2011, 01/25/2011   • Tdap 05/26/2016           Radiology (independently reviewed and interpreted by me): CT scan showing right hilar mass likely malignancy with mediastinal lymphadenopathy  CT Chest Without Contrast Diagnostic    Result Date: 4/22/2022  PROCEDURE: CT CHEST WO CONTRAST DIAGNOSTIC  COMPARISON: [facility], [place], CHEST PA/AP & LAT 2V, 6/11/2015, 14:19.  [facility], [place], CHEST PA/AP & LAT 2V, 5/12/2021, 11:26.  Other, CR, XR CHEST 1 VW, 4/12/2022, 18:09.  INDICATIONS: Abnormal xray - lung nodule, < 1 cm, mod-high risk  PROTOCOL:   Standard imaging protocol performed    RADIATION:   DLP: 395.5 mGy*cm   Automated exposure control was utilized to minimize radiation dose.  TECHNIQUE: Axial images of the chest without intravenous contrast.  FINDINGS: Prior median sternotomy and aortic valve replacement.  There is a stent in the left subclavian artery.  There is a right hilar mass partially obstructing the right middle lobe bronchi.  There is some associated volume loss/consolidation in the right middle lobe.  Precarinal and subcarinal adenopathy are present measuring up to 2.5 cm x 1.8 cm.  No pleural effusions are identified.  The bones are osteopenic.  Mild degenerative changes are present in the thoracic spine.  There is diffuse atherosclerotic disease.  Images of the unenhanced upper abdomen are unremarkable.   IMPRESSION:  Right hilar mass partially obstructing the right middle lobe bronchi with some associated volume loss and consolidation.   Precarinal and subcarinal adenopathy.  The findings are suspicious for malignancy.  Suggest a PET-CT scan for further evaluation.  ELGIN ORANTES MD       Electronically Signed and Approved By: ELGIN ORANTES MD on 4/22/2022 at 16:45                 Assessment/Plan     Diagnoses and all orders for this visit:    1. Lung nodule (Primary)    2. Alzheimer disease (HCC)    3. Stage 3b chronic kidney disease (HCC)    4. Mass of lower lobe of right lung         Discussion/ Recommendations:   Results of CT scan were reviewed with the patient  Patient's daughter was advised of the probability of malignancy in the right hilum  Patient is already scheduled for a PET scan on May 10  We will review the results of that and then schedule him for bronchoscopy if he is agreeable  Discussed vaccination and recommended    BMI is >= 25 and < 30. (Overweight) The following options were offered after discussion: nutrition counseling/recommendations           Return in about 3 weeks (around 5/17/2022).      Thank you for allowing me to participate in the care of Blaine Morales. Please do not hesitate to contact me with any questions.         This document has been electronically signed by Judd Treviño MD on April 26, 2022 11:15 EDT

## 2022-04-27 NOTE — OUTREACH NOTE
AMBULATORY CASE MANAGEMENT NOTE    Name and Relationship of Patient/Support Person: Karyn Kitchen - Emergency Contact    Called Karyn for an update on yesterdays office visit with pulmonary, most likely lung ca.  Will follow thru with PET scan and has follow up with PCP and Pulmonology to discuss results.  Supportive care given and will continue to follow.       HELEN R  Ambulatory Case Management    4/27/2022, 11:14 EDT     CCM End of Month Documentation    This Chronic Medical Management Care Plan for Blaine Morales, 88 y.o. male, has been monitored and managed and a new plan of care implemented for the month of September.  A cumulative time of 78  minutes was spent on this patient record this month, including phone call with relative; electronic communication with primary care provider; electronic communication with other providers; chart review, Monitoring and managing care with assistance of daughter..    Regarding the patient's problems: has Acute pain of right shoulder; Trigger point of right shoulder region; Bilateral carotid artery stenosis; CKD (chronic kidney disease) stage 3, GFR 30-59 ml/min ([facility]); Arteriosclerosis of coronary artery; Alzheimer disease ([facility]); Hypertension; Hyperlipidemia; Abnormal weight loss; Aortic aneurysm ([facility]); Aortic valve stenosis; Diverticulitis; Dyspnea; Echocardiogram abnormal; Edema; Gastric ulcer; Heart murmur; High prostate specific antigen (PSA); Type 2 diabetes mellitus ([facility]); Hyperkalemia; 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 ([facility]); Obesity (BMI 30-39.9); PAD (peripheral artery disease) ([facility]); Peripheral nerve disease; Pneumonia; Presence of aortocoronary bypass graft; Presence of prosthetic heart valve; Right carotid artery occlusion; Stenosis of carotid artery; Stenosis of left subclavian artery ([facility]); Systolic murmur; Lung nodule; Pressure injury of left buttock, stage 1; History of smoking; CHF (congestive heart failure)  (HCC); and Abnormal CT of the chest on their problem list., the following items were addressed: transitions to medical care; medications; changes to medical care, with multiple hospital visits, medication have been changed multiple times. and any changes can be found within the plan section of the note.  A detailed listing of time spent for chronic care management is tracked within each outreach encounter.  Current medications include:  has a current medication list which includes the following prescription(s): amlodipine, aspirin, atorvastatin, bumetanide, clopidogrel, donepezil, escitalopram, memantine, metoprolol succinate xl, multivitamin with minerals, ondansetron odt, promethazine, and quetiapine. and the patient is reported to be patient is compliant with medication protocol; caregiver will take responsibility for med compliance, Compliant,  Medications are reported to be effective, medication has been helping the pvd..  Regarding these diagnoses, referrals were made to the following provider(s):  specialists.  All notes on chart for PCP to review.    The patient was monitored remotely for weight; mood & behavior; pain; medications, Monitors and reports abonormalities and changes..    The patient's physical needs include:  help taking medications as prescribed; needs assistance with ADLs; physician referral,  has round the clock caregivers that provide assistance..     The patient's mental support needs include:  increased support, supportive.  Loss of wife, depression.    The patient's cognitive support needs include:  medication; continued support; health care; household care; supervision, Reminders provided and support given.    The patient's psychosocial support needs include:  continued support; medication management or adherence, Primarily homebound[facility]    The patient's functional needs include: physician referral; physical healthcare; needs assistance for ADLs, some  ADL's    The patient's environmental needs include:  not applicable; no involvement in outside activities or no access to other activities, n/a    Care Plan overall comments:  Monitoring and managing health care.    Refer to previous outreach notes for more information on the areas listed above.    Monthly Billing Diagnoses  (I50.9) Chronic congestive heart failure, unspecified heart failure type (HCC)    (I73.9) PAD (peripheral artery disease) ([facility])    (N18.32) Stage 3b chronic kidney disease ([facility])    Medications   · Medications have been reconciled    Care Plan progress this month:      Recently Modified Care Plans Updates made since 3/27/2022 12:00 AM     Heart Failure (Adult)         Problem Priority Last Modified     ELS SYMPTOM EXACERBATION (HEART FAILURE) (ADULT) --  4/18/2022 10:41 AM by Naomy Brunson RN              Goal Recent Progress Last Modified     Symptom Exacerbation Prevented or Minimized --  4/18/2022 10:41 AM by Naomy Brunson RN     Evidence-based guidance:   Perform or review cognitive and/or health literacy screening.   Assess understanding of adherence and barriers to treatment plan, as well as lifestyle changes; develop strategies to address barriers.   Establish a [id number] early intervention process to communicate with primary care provider when signs/symptoms worsen.   Facilitate timely posthospital discharge or emergency department treatment that includes intensive follow-up via telephone calls, home visit, telehealth monitoring and care at multidisciplinary heart failure clinic.   Adjust frequency and intensity of follow-up based on presentation, number of emergency department visits, hospital admissions and frequency and severity of symptom exacerbation.   Facilitate timely visit, usually within 1 week, with primary care provider following hospital discharge.   Collaborate with clinical pharmacist to address adverse drug reactions, drug interactions, subtherapeutic  dosage, patient and family education.   Regularly screen for presence of depressive symptoms using a validated tool; consider pharmacologic therapy and/or referral for cognitive behavioral therapy when present.   Refer to community-based services, such as a heart failure support group, community health worker or peer support program.   Review immunization status; arrange receipt of needed vaccinations.   Prepare patient for home oxygen use based on signs/symptoms.    Notes:              Task Due Date Last Modified     Identify and Minimize Risk of Heart Failure Exacerbation --  4/18/2022 10:41 AM by Naomy Brunson RN     Care Management Activities:      - self-awareness of signs/symptoms of worsening disease encouraged      Notes:                Problem Priority Last Modified     ELS DISEASE PROGRESSION (HEART FAILURE) (ADULT) --  4/18/2022 10:41 AM by Naomy Brunson RN              Goal Recent Progress Last Modified     Comorbidities Identified and Managed --  4/18/2022 10:41 AM by Naomy Brunson RN     Evidence-based guidance:   Assess and address signs/symptoms of comorbidity, including dyslipidemia, diabetes, iron deficiency, gout, arthritis, dysrhythmia, hypertension, cachexia, coronary artery disease, kidney dysfunction and lung disease.   Prepare patient for laboratory and diagnostic exams based on risk and presentation.   Prepare for use of pharmacologic therapy that may include statin, angiotensin converting enzyme (ACE) inhibitor, angiotensin receptor blocker (ARB), beta-blocker, digoxin, antidysrhythmic, diuretic or omega-3 fatty acid.   Monitor side effects and anticipate need for periodic adjustments.   Prepare patient for potential invasive treatment, such as implantable cardioverter-defibrillator, cardiac resynchronization therapy or heart transplant as disease progresses.    Notes:              Task Due Date Last Modified     Identify and Manage Comorbidities --  4/18/2022 10:42 AM by Boy  DAVID Moralez     Care Management Activities:      - not discussed during this outreach      Notes:                Goal Recent Progress Last Modified     Health Optimized --  4/18/2022 10:41 AM by Naomy Brunson RN     Evidence-based guidance:   Use brief intervention, such as 5 A's (Ask, Advise, Assess, Assist, Arrange) to encourage smoking cessation; refer to smoking cessation program, if ready for more intensive intervention.   Perform or refer to a registered dietitian for a nutrition assessment and nutrition-focused physical exam.    Identify potential micronutrient deficiencies, such as iron, vitamin D and thiamin.   Assess need for potential diet and fluid modification, such as reduced sodium or fluid intake.   Minimize unnecessary dietary restrictions to increase oral intake. Note: Sodium restriction should be individualized to the patient and clinical status.   Facilitate home monitoring of weight.    Notes:              Task Due Date Last Modified     Optimize Health --  4/18/2022 10:42 AM by Naomy Brunson RN     Care Management Activities:      - home monitoring of weight gain or loss encouraged  - unnecessary dietary restrictions minimized  - weight gain or loss reviewed and trended      Notes:                Problem Priority Last Modified     ELS ACTIVITY TOLERANCE (HEART FAILURE) (ADULT) --  4/18/2022 10:41 AM by Naomy Brunson, DAVID              Goal Recent Progress Last Modified     Activity Tolerance Optimized --  4/18/2022 10:41 AM by Naomy Brunson RN     Evidence-based guidance:   Promote daily physical activity that improves functional ability, cognition and quality of life.   Encourage reduction in sedentary time.    Encourage optimal, safe functional mobility and self-care performance based on ability and tolerance.    Promote breathing and energy conservation techniques, such as pursed-lip breathing, preplanning and pacing of activity, balancing activity and rest.   Encourage participation in  cardiac rehabilitation services.    Notes:              Task Due Date Last Modified     Maintain Strength and Functional Ability --  4/18/2022 10:43 AM by Helen Brunson RN     Care Management Activities:      - not discussed during this outreach      Notes:                          Instructions   · Patient was provided an electronic copy of care plan  · CCM services were explained and offered and patient has accepted these services.  · Patient has given their written consent to receive CCM services and understands that this includes the authorization of electronic communication of medical information with the other treating providers.  · Patient understands that they may stop CCM services at any time and these changes will be effective at the end of the calendar month and will effectively revocate the agreement of CCM services.  · Patient understands that only one practitioner can furnish and be paid for CCM services during one calendar month.  Patient also understands that there may be co-payment or deductible fees in association with CCM services.  · Patient will continue with at least monthly follow-up calls with the Nurse Navigator.    HELEN GARZA  Ambulatory Case Management    4/27/2022, 11:14 EDT

## 2022-05-11 NOTE — ASSESSMENT & PLAN NOTE
His PET CT scan is consistent with neoplasm.  Has follow-up appointment with pulmonology scheduled.  Intent was previously made about possible bronchoscopy.  His daughter, Karyn, is not sure that they will pursue further evaluation or treatment.  His quality of life seems to be pretty good right now.

## 2022-05-11 NOTE — PROGRESS NOTES
Chief Complaint  Alzheimer's Disease (1 month follow up), Hypertension, Chronic Kidney Disease, and Diabetes    Subjective          Blaine Morales presents to [facility]  History of Present Illness    Went to the emergency room 12th April CT/MRI of brain without acute changes.  At his last visit on April 1 he was recovering from hospitalization and treatment for pneumonia.  During that hospital stay he was noted to have an abnormal chest CT with right hilar and subcarinal lymphadenopathy.  He had appointment with pulmonology Dr. Treviño April 26.  That note is reviewed.  He was scheduled for PET scan yesterday and is to be considered for bronchoscopy.  We did get a copy of the PET scan and it confirms presence of hypermetabolic activity consistent with neoplasm.  Radiologist gives differential of possible small cell cancer or lymphoma involving mediastinal nodes.  Patient's daughter, Karyn, indicates that the family had discussion and they think they will take a minimalist approach not pursue aggressive treatments and perhaps not even pursue further diagnostic evaluation.    At his hospitalization in March Bumex was increased to 2 mg daily to address his issues with congestive heart failure.  He continues to follow with cardiology office usually sees Hernandez Spicer    He has follow-up appointment with vascular surgeon Dr. Claudia Gonzales on June 22 follow-up on his PAD    KAYLENE:  The patient presents today for follow up evaluation of Alzheimer's disease, hypertension, chronic kidney disease, and diabetes. He is accompanied by her daughter and granddaughter    Health maintenance  The patient was seen in the emergency room on 04/12/2022 for difficulty breathing along with a visit in 03/2022 for pneumonia. She confirms prior to the ER visit he was hospitalized for pneumonia and notes he was evaluated by the referred pulmonologist approximately 2 weeks ago followed by a PET scan performed  "on 05/12/2022. The patient currently has a scheduled follow up visit with pulmonologist on 05/18/2022. The patient denies any pain or discomfort at this time and is resting more. She states that she can tell sometimes just walking from the car to the house that he is a little winded, but it comes back and reports he sleeps pretty good at nighttime .He is currently under evaluation of vascular surgeon and notes his next appointment is scheduled for 06/2022    Head pain  She states that on 04/12/2022, they went to Dr. Ng's office for some blood work and following the visit she proceeded to  her prescriptions. While in the parking lot, her father began screaming with his head. She immediately proceeds to her home to get him in the house. The patient describes the episode specifically by stating, \"we got him in the house, but coming into the house, he couldn't see out of his right eye, that lasted for 3 to 4 seconds, but then his psych came back. I gave him two Tylenol, set him on the couch, I called Shante and after a while he started to feel better, the headache was calming down, he could kick his legs, he could smile, and he could lift his arms up after around 30 minutes. Shante said if he did it again, take him to the ER, and he did do it, not as badly again that afternoon, between 4:00 pm and 5:00 pm at home, which is why the patient was taken to the emergency room and MRI images were obtained of his head. The patients daughter states, \"And that's why they did the CAT scan on his head, on his chest, and then the next morning they did an MRI on his head.\"  The treatment team found an old stroke however did not find any evidence of recent development. The patients daughter also states, \"With these little headaches he has had maybe one once a week, but they gave him the Tylenol, then he feels better and we also took him to the eye doctor I believe it was the last Monday, 05/09/2022. We went out to for blood " "work and I believe they, he called it arterial temporal something like that. It was Dr. Cruz from Dr. Freitas's office. \" His daughter confirms that head pain episode that occurred on 04/12/2022 happens sporadically with no notable trigger as the cause.       Current Outpatient Medications   Medication Sig Dispense Refill   • amLODIPine (NORVASC) 2.5 MG tablet Take 1 tablet by mouth Every Night. 28 tablet 2   • aspirin (aspirin) 81 MG EC tablet Take 1 tablet by mouth Daily. 28 tablet 2   • atorvastatin (LIPITOR) 80 MG tablet Take 1 tablet by mouth Daily. 84 tablet 0   • bumetanide (BUMEX) 2 MG tablet Take 1 tablet by mouth Daily. 30 tablet 0   • clopidogrel (PLAVIX) 75 MG tablet Take 1 tablet by mouth Daily. 90 tablet 0   • donepezil (ARICEPT) 10 MG tablet Take 1 tablet by mouth Daily. 90 tablet 0   • escitalopram (LEXAPRO) 5 MG tablet Take 1 tablet by mouth Daily. 90 tablet 0   • memantine (NAMENDA) 10 MG tablet Take 1 tablet by mouth 2 (Two) Times a Day. 180 tablet 0   • metoprolol succinate XL (TOPROL-XL) 25 MG 24 hr tablet Take 1 tablet by mouth Daily. 84 tablet 0   • multivitamin with minerals tablet tablet Take 1 tablet by mouth Daily. 100 tablet 0   • ondansetron ODT (ZOFRAN-ODT) 4 MG disintegrating tablet Place 4 mg on the tongue Every 8 (Eight) Hours As Needed.     • promethazine (PHENERGAN) 12.5 MG tablet Take 12.5 mg by mouth Every 6 (Six) Hours As Needed.     • QUEtiapine (SEROquel) 25 MG tablet Take 0.5 tablets by mouth Every Night. 14 tablet 2     No current facility-administered medications for this visit.       Review of Systems   A review of systems was performed, and pertinent findings are listed within the HPI.      Objective   Vital Signs:   /55 (BP Location: Right arm, Patient Position: Sitting, Cuff Size: Adult)   Pulse 70   Temp 97.4 °F (36.3 °C) (Oral)   Ht 180.3 cm (71\")   Wt 85.5 kg (188 lb 6.4 oz)   SpO2 95% Comment: Room air  BMI 26.28 kg/m²     Physical Exam   Is in no acute " distress  Was sitting in his chair with eyes closed.  He may be sleeping during some of the conversation with his daughter.  He was pleasant and cooperative  Heart was regular rhythm 2/6 systolic murmur  Lungs bilaterally clear  Abdomen obese soft nontender  Lower extremities trace edema he is wearing support stockings  Neurologic no lateralizing focal deficits, he is noted to have dementia and defers much of the conversation to his daughter      Result Review :                     Assessment and Plan    Diagnoses and all orders for this visit:    1. Pneumonia due to infectious organism, unspecified laterality, unspecified part of lung (Primary)  Comments:  He seems to have totally recovered from his pneumonia    2. Abnormal CT of the chest  Assessment & Plan:  His PET CT scan is consistent with neoplasm.  Has follow-up appointment with pulmonology scheduled.  Intent was previously made about possible bronchoscopy.  His daughter, Karyn, is not sure that they will pursue further evaluation or treatment.  His quality of life seems to be pretty good right now.      3. Alzheimer disease ([facility])  Comments:  Continue with supportive care.  He is got 24-hour caregivers.  Tolerate current medications.    4. Primary hypertension  Comments:  Blood pressure is okay continue current regimen    5. Mixed hyperlipidemia  Comments:  Is currently on statin due to his peripheral artery disease but under the circumstances we may be able to stop the medication soon    6. Type 2 diabetes mellitus with other circulatory complication, without long-term current use of insulin ([facility])  Comments:  Not really concerned about type blood sugar control.  We just want to keep him out of extremes at this point    7. PAD (peripheral artery disease) ([facility])  Comments:  Has appointment for follow-up in June and I will place that by ear depending on what happens with the neoplasm    8. Chronic combined systolic and diastolic congestive heart failure  ([facility])  Comments:  Seems fairly well compensated at the present.  Continue on current regimen.  Follow-up with cardiology as they recommend    9. Presence of prosthetic heart valve    10. Aortic aneurysm without rupture, unspecified portion of aorta ([facility])  Comments:  As mentioned he is got the appointment with vascular surgery scheduled in June      Follow Up   No follow-ups on file.  Patient was given instructions and counseling regarding his condition or for health maintenance advice. Please see specific information pulled into the AVS if appropriate.   Transcribed from ambient dictation for Ankur Allen MD by Nalini Vanegas.  05/13/22   11:51 EDT    Patient verbalized consent to the visit recording.

## 2022-05-11 NOTE — OUTREACH NOTE
AMBULATORY CASE MANAGEMENT NOTE    Name and Relationship of Patient/Support Person:  -     Logged onto [employer] to pull report of PET scan.      Education Documentation  No documentation found.        HELEN R  Ambulatory Case Management    5/11/2022, 14:30 EDT     AMBULATORY CASE MANAGEMENT NOTE    Name and Relationship of Patient/Support Person:  -     Mailed lab results to patient.     Education Documentation  No documentation found.        HELEN R  Ambulatory Case Management    5/12/2022, 09:21 EDT

## 2022-05-18 NOTE — PROGRESS NOTES
"Primary Care Provider  Ankur Allen MD     Referring Provider  No ref. provider found     Chief Complaint  Lung Nodule (3 week f/u after PET scan ), Shortness of Breath (No more than usual. ), and Cough (Sometimes )    Subjective          Blaine Morales presents to [facility]  History of Present Illness  Blaine Morales is a 88 y.o. male patient of Dr. Treviño here for management of pulmonary nodules, cough, congestive heart failure, hypertension, chronic kidney disease shortness of breath, and tobacco use of cigarettes in remission.    Patient recently had a PET scan on 5/10/2022 at [facility].  His PET scan shows \"rather intense hypermetabolism associated with AP window, subcarinal, right hilar adenopathy all within the malignant range concerning for neoplasm.\"  These findings were discussed with patient and family today.  Patient's daughter states that they did see their primary care provider the day after having their PET scan, and this was also reviewed at that time.  Patient's family and himself are in agreement to not have further biopsies at this time.  Patient does have dementia and currently does have a decent quality of life.  Patient's daughter is concerned that if he has his procedure, there may be adverse effects and he would not want the treatment of chemotherapy or radiation.  Very lengthy conversation spent with daughter and patient about PET scan and the previous CT scan.  Also spoke with Dr. Hall about patient's PET scan and the possibility of small cell lung cancer versus lymphoma.  Explained to patient and family that without a biopsy, these diagnoses are not definitive.  They verbalized understanding at this time and do not want to put the patient through a biopsy at this point in time.  Patient does complain of fatigue, but otherwise is doing well.  He does have mild shortness of breath but this is not unusual for him.  He has " an occasionally productive cough, but denies any hemoptysis or recent weight loss.  Patient's daughter states that he already has 24-hour care at home and has home health.  Physical therapy is also working with him, but they may discharge him soon.  Patient's daughter states that he is not able to do as much as he did before, but overall is still trying to stay slightly active and is enjoying his time.  Encouraged patient and family that if they change their mind to let her office know and we can proceed with a biopsy.  He is up-to-date with his COVID, flu, and pneumonia vaccines.     His history of smoking is   Tobacco Use: Medium Risk   • Smoking Tobacco Use: Former Smoker   • Smokeless Tobacco Use: Never Used   .    Review of Systems   Constitutional: Positive for fatigue. Negative for chills, fever, unexpected weight gain and unexpected weight loss.   HENT: Negative for congestion (Nasal), hearing loss, mouth sores, nosebleeds, postnasal drip, sore throat and trouble swallowing.    Eyes: Negative for blurred vision and visual disturbance.   Respiratory: Positive for cough and shortness of breath. Negative for apnea and wheezing.         Negative for Hemoptysis     Cardiovascular: Negative for chest pain, palpitations and leg swelling.   Gastrointestinal: Negative for abdominal pain, constipation, diarrhea, nausea, vomiting and GERD.        Negative for Jaundice  Negative for Bloating  Negative for Melena   Musculoskeletal: Negative for joint swelling and myalgias.        Negative for Joint pain  Negative for Joint stiffness   Skin: Negative for color change.        Negative for cyanosis   Neurological: Negative for syncope, weakness, numbness and headache.      Sleep: Negative for Excessive daytime sleepiness  Negative for morning headaches  Negative for Snoring    Family History   Problem Relation Age of Onset   • Dislocations Other    • Hypertension Other    • Stroke Mother         CAUSE OF DEATH   • Heart  attack Father         CAUSE OF DEATH   • Cancer Brother         UNSPECIFIED TYPE   • Stroke Brother 75        Social History     Socioeconomic History   • Marital status:    Tobacco Use   • Smoking status: Former Smoker     Packs/day: 1.00     Years: 47.00     Pack years: 47.00     Types: Cigarettes     Start date:      Quit date:      Years since quittin.3   • Smokeless tobacco: Never Used   Vaping Use   • Vaping Use: Never used   Substance and Sexual Activity   • Alcohol use: Not Currently     Comment: RARE   • Drug use: Never   • Sexual activity: Defer        Past Medical History:   Diagnosis Date   • Abdominal aortic aneurysm without rupture ([facility])    • Acute bronchitis, unspecified    • Adjustment disorder with mixed anxiety and depressed mood    • Alzheimer disease ([facility])    • Alzheimer's disease with late onset ([facility])    • 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    • Atherosclerosis of native arteries of extremities with intermittent claudication, right leg ([facility])    • Cardiac arrhythmia, unspecified    • Causalgia of right lower limb    • Cervicalgia    • Chronic kidney disease, stage 3 ([facility])     MODERATE   • Chronic kidney disease, stage 3b ([facility])    • Diabetes ([facility])    • Dizziness and giddiness    • Dyspnea, unspecified    • Edema, unspecified    • Essential (primary) hypertension    • Fecal impaction ([facility])    • Gout, unspecified    • Hallucinations, unspecified    • History of falling    • History of pneumonia     HOSPITALIZATION AT [facility]   • Idiopathic chronic gout, unspecified site, without tophus (tophi)    • Localized edema    • Male erectile dysfunction, unspecified    • Mixed hyperlipidemia    • Myocardial infarct ([facility]) 2021    ADMITTED ONCE   • Nonrheumatic aortic (valve) stenosis    • Other long term (current) drug therapy    • Other symptoms and signs involving cognitive functions and awareness    • Pain in right shoulder    • Presence of  prosthetic heart valve    • Rash and other nonspecific skin eruption    • Skin cancer (melanoma) (HCC)    • Slow transit constipation    • Syncope and collapse    • Type 2 diabetes mellitus without complications (HCC)         Immunization History   Administered Date(s) Administered   • COVID-19 (MODERNA) 1st, 2nd, 3rd Dose Only 06/01/2021, 06/01/2021, 06/29/2021, 06/29/2021, 12/14/2021   • Covid-19 (Pfizer) Gray Cap 04/01/2022   • Fluzone High Dose =>65 Years ([employer] ONLY) 11/03/2017   • Fluzone High-Dose 65+yrs 11/03/2021   • Influenza, Unspecified 10/28/2020, 10/28/2020   • Pneumococcal Conjugate 13-Valent (PCV13) 06/30/2015, 06/30/2015, 02/03/2021, 02/03/2021, 02/03/2021   • Pneumococcal Polysaccharide (PPSV23) 01/25/2011, 01/25/2011   • Tdap 05/26/2016         Allergies   Allergen Reactions   • Hydrocodone-Acetaminophen Nausea And Vomiting     hydrocodone/apap 5-325mg   • Penicillins Unknown - Low Severity   • Cefaclor Rash   • Penicillin G Unknown - Low Severity   • Sulfa Antibiotics Unknown - Low Severity          Current Outpatient Medications:   •  amLODIPine (NORVASC) 2.5 MG tablet, Take 1 tablet by mouth Every Night., Disp: 28 tablet, Rfl: 2  •  aspirin (aspirin) 81 MG EC tablet, Take 1 tablet by mouth Daily., Disp: 28 tablet, Rfl: 2  •  atorvastatin (LIPITOR) 80 MG tablet, Take 1 tablet by mouth Daily., Disp: 84 tablet, Rfl: 0  •  bumetanide (BUMEX) 2 MG tablet, Take 1 tablet by mouth Daily., Disp: 30 tablet, Rfl: 0  •  clopidogrel (PLAVIX) 75 MG tablet, Take 1 tablet by mouth Daily., Disp: 90 tablet, Rfl: 0  •  donepezil (ARICEPT) 10 MG tablet, Take 1 tablet by mouth Daily., Disp: 90 tablet, Rfl: 0  •  escitalopram (LEXAPRO) 5 MG tablet, Take 1 tablet by mouth Daily., Disp: 90 tablet, Rfl: 0  •  memantine (NAMENDA) 10 MG tablet, Take 1 tablet by mouth 2 (Two) Times a Day., Disp: 180 tablet, Rfl: 0  •  metoprolol succinate XL (TOPROL-XL) 25 MG 24 hr tablet, Take 1 tablet by mouth Daily., Disp: 84 tablet,  Rfl: 0  •  multivitamin with minerals tablet tablet, Take 1 tablet by mouth Daily., Disp: 100 tablet, Rfl: 0  •  ondansetron ODT (ZOFRAN-ODT) 4 MG disintegrating tablet, Place 4 mg on the tongue Every 8 (Eight) Hours As Needed., Disp: , Rfl:   •  promethazine (PHENERGAN) 12.5 MG tablet, Take 12.5 mg by mouth Every 6 (Six) Hours As Needed., Disp: , Rfl:   •  QUEtiapine (SEROquel) 25 MG tablet, Take 0.5 tablets by mouth Every Night., Disp: 14 tablet, Rfl: 2     Objective   Physical Exam  Constitutional:       General: He is not in acute distress.     Appearance: Normal appearance. He is normal weight.   HENT:      Right Ear: Hearing normal.      Left Ear: Hearing normal.      Nose: No nasal tenderness or congestion.      Mouth/Throat:      Mouth: Mucous membranes are moist. No oral lesions.   Eyes:      Extraocular Movements: Extraocular movements intact.      Pupils: Pupils are equal, round, and reactive to light.   Neck:      Thyroid: No thyroid mass or thyromegaly.   Cardiovascular:      Rate and Rhythm: Normal rate and regular rhythm.      Pulses: Normal pulses.      Heart sounds: Normal heart sounds. No murmur heard.  Pulmonary:      Effort: Pulmonary effort is normal.      Breath sounds: Normal breath sounds. No wheezing, rhonchi or rales.   Chest:   Breasts:      Right: No axillary adenopathy.       Abdominal:      General: Bowel sounds are normal. There is no distension.      Palpations: Abdomen is soft.      Tenderness: There is no abdominal tenderness.   Musculoskeletal:      Cervical back: Neck supple.      Right lower leg: No edema.      Left lower leg: No edema.   Lymphadenopathy:      Cervical: No cervical adenopathy.      Upper Body:      Right upper body: No axillary adenopathy.   Skin:     General: Skin is warm and dry.      Findings: No lesion or rash.   Neurological:      Mental Status: He is alert and oriented to person, place, and time. Mental status is at baseline.      Cranial Nerves: Cranial  "nerves are intact.   Psychiatric:         Mood and Affect: Affect normal. Mood is not anxious or depressed.         Vital Signs:   /60 (BP Location: Left arm, Patient Position: Sitting, Cuff Size: Adult)   Pulse 68   Temp 98 °F (36.7 °C) (Infrared)   Resp 14   Ht 180.3 cm (71\")   Wt 84.4 kg (186 lb)   SpO2 94% Comment: room air  BMI 25.94 kg/m²        Result Review :     CMP    CMP 11/15/21 12/9/21 5/11/22   Glucose 222 (A) 156 (A) 149 (A)   BUN 34 (A) 41 (A) 31 (A)   Creatinine 1.61 (A) 1.86 (A) 1.50 (A)   eGFR Non  Am 41 (A) 34 (A)    Sodium 142 144 145   Potassium 5.0 4.9 4.3   Chloride 103 102 107   Calcium 9.2 9.0 9.2   Albumin  3.60 4.00   Total Bilirubin  0.2 0.2   Alkaline Phosphatase  93 82   AST (SGOT)  19 25   ALT (SGPT)  17 17   (A) Abnormal value            CBC w/diff    CBC w/Diff 10/29/21 12/9/21 5/11/22   WBC 8.65 6.85 8.49   RBC 4.30 3.97 (A) 3.95 (A)   Hemoglobin 13.0 12.1 (A) 12.2 (A)   Hematocrit 41.1 39.5 39.3   MCV 95.6 99.5 (A) 99.5 (A)   MCH 30.2 30.5 30.9   MCHC 31.6 30.6 (A) 31.0 (A)   RDW 15.1 14.1 14.8   Platelets 222 163 201   Neutrophil Rel % 71.3 64.1 71.6   Immature Granulocyte Rel % 0.2 0.3 0.2   Lymphocyte Rel % 11.1 (A) 11.4 (A) 9.2 (A)   Monocyte Rel % 9.8 12.6 (A) 9.7   Eosinophil Rel % 6.7 (A) 10.4 (A) 8.4 (A)   Basophil Rel % 0.9 1.2 0.9   (A) Abnormal value            Data reviewed: Radiologic studies Chest CT 4/22/2022, PET scan 5/10/2022 and Dr. Treviño's last office note   Procedures        Assessment and Plan    Diagnoses and all orders for this visit:    1. Abnormal CT of the chest (Primary)    2. Lung nodule    3. Alzheimer disease (HCC)    4. Stage 3b chronic kidney disease (HCC)    5. Chronic combined systolic and diastolic congestive heart failure (HCC)    6. Primary hypertension    7. Nicotine dependence, cigarettes, in remission    8. Dyspnea on exertion    9. Cough    10. Lymphadenopathy    11. Abnormal PET scan of lung    12.  Follow-up with " primary care as scheduled.  13.  Continue follow-up with cardiology as scheduled.  14.  Follow-up with Dr. Treviño in 3 months, sooner if needed    I spent 53 minutes caring for Blaine on this date of service. This time includes time spent by me in the following activities:preparing for the visit, reviewing tests, obtaining and/or reviewing a separately obtained history, performing a medically appropriate examination and/or evaluation , counseling and educating the patient/family/caregiver, referring and communicating with other health care professionals  and documenting information in the medical record    Follow Up   Return in 3 months (on 8/18/2022) for Recheck with Dr. Treviño.  Patient was given instructions and counseling regarding his condition or for health maintenance advice. Please see specific information pulled into the AVS if appropriate.

## 2022-05-23 NOTE — OUTREACH NOTE
AMBULATORY CASE MANAGEMENT NOTE    Name and Relationship of Patient/Support Person: Karyn Kitchen - Emergency Contact    Called and spoke with Karyn, saw pulmonology and going to follow up and not going to seek any treatment.   He is having a good day, PT has d/c him.    Scheduled a 3 month follow up.      Education Documentation  No documentation found.      HELEN R  Ambulatory Case Management    5/23/2022, 13:38 EDT     CCM End of Month Documentation    This Chronic Medical Management Care Plan for Blaine Morales, 88 y.o. male, has been monitored and managed and a new plan of care implemented for the month of May.  A cumulative time of 20  minutes was spent on this patient record this month, including phone call with relative; electronic communication with primary care provider; chart review.    Regarding the patient's problems: has Acute pain of right shoulder; Trigger point of right shoulder region; Bilateral carotid artery stenosis; CKD (chronic kidney disease) stage 3, GFR 30-59 ml/min ([facility]); Arteriosclerosis of coronary artery; Alzheimer disease ([facility]); Hypertension; Hyperlipidemia; Abnormal weight loss; Aortic aneurysm ([facility]); Aortic valve stenosis; Diverticulitis; Dyspnea; Echocardiogram abnormal; Edema; Gastric ulcer; Heart murmur; High prostate specific antigen (PSA); Type 2 diabetes mellitus ([facility]); Hyperkalemia; 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 ([facility]); Obesity (BMI 30-39.9); PAD (peripheral artery disease) ([facility]); Peripheral nerve disease; Pneumonia; Presence of aortocoronary bypass graft; Presence of prosthetic heart valve; Right carotid artery occlusion; Stenosis of carotid artery; Stenosis of left subclavian artery ([facility]); Systolic murmur; Lung nodule; Pressure injury of left buttock, stage 1; History of smoking; CHF (congestive heart failure) ([facility]); and Abnormal CT of the chest on their problem list., the following items were addressed: transitions to medical  care; medications; changes to medical care and any changes can be found within the plan section of the note.  A detailed listing of time spent for chronic care management is tracked within each outreach encounter.  Current medications include:  has a current medication list which includes the following prescription(s): amlodipine, aspirin, atorvastatin, bumetanide, clopidogrel, donepezil, escitalopram, memantine, metoprolol succinate xl, multivitamin with minerals, ondansetron odt, promethazine, and quetiapine. and the patient is reported to be patient is compliant with medication protocol; caregiver will take responsibility for med compliance, Compliant,  Medications are reported to be effective.  Regarding these diagnoses, referrals were made to the following provider(s):  specialists.  All notes on chart for PCP to review.    The patient was monitored remotely for weight; mood & behavior; pain; medications, Monitors and reports abonormalities and changes..    The patient's physical needs include:  help taking medications as prescribed; needs assistance with ADLs; physician referral.     The patient's mental support needs include:  increased support, supportive.  Loss of wife, depression.    The patient's cognitive support needs include:  medication; continued support; health care; household care; supervision    The patient's psychosocial support needs include:  continued support; medication management or adherence    The patient's functional needs include: physician referral; physical healthcare; needs assistance for ADLs    The patient's environmental needs include:  not applicable; no involvement in outside activities or no access to other activities    Care Plan overall comments:  Monitoring and managing health care.    Refer to previous outreach notes for more information on the areas listed above.    Monthly Billing Diagnoses  (G30.9,  F02.80) Alzheimer disease (HCC)    (I50.9) Chronic congestive heart  failure, unspecified heart failure type (HCC)    (I73.9) PAD (peripheral artery disease) (HCC)    Medications   · Medications have been reconciled    Care Plan progress this month:      Recently Modified Care Plans Updates made since 4/22/2022 12:00 AM    No recently modified care plans.            Instructions   · Patient was provided an electronic copy of care plan  · CCM services were explained and offered and patient has accepted these services.  · Patient has given their written consent to receive CCM services and understands that this includes the authorization of electronic communication of medical information with the other treating providers.  · Patient understands that they may stop CCM services at any time and these changes will be effective at the end of the calendar month and will effectively revocate the agreement of CCM services.  · Patient understands that only one practitioner can furnish and be paid for CCM services during one calendar month.  Patient also understands that there may be co-payment or deductible fees in association with CCM services.  · Patient will continue with at least monthly follow-up calls with the Nurse Navigator.    HELEN GARZA  Ambulatory Case Management    5/23/2022, 13:38 EDT

## 2022-06-10 ENCOUNTER — HOSPITAL ENCOUNTER (EMERGENCY)
Dept: HOSPITAL 49 - FER | Age: 87
Discharge: HOME | End: 2022-06-10
Payer: MEDICARE

## 2022-06-10 DIAGNOSIS — I13.0: ICD-10-CM

## 2022-06-10 DIAGNOSIS — I50.9: ICD-10-CM

## 2022-06-10 DIAGNOSIS — Z79.899: ICD-10-CM

## 2022-06-10 DIAGNOSIS — Z87.891: ICD-10-CM

## 2022-06-10 DIAGNOSIS — K40.90: Primary | ICD-10-CM

## 2022-06-10 DIAGNOSIS — Z79.82: ICD-10-CM

## 2022-06-10 DIAGNOSIS — F03.90: ICD-10-CM

## 2022-06-10 DIAGNOSIS — N18.32: ICD-10-CM

## 2022-06-10 LAB
ALBUMIN SERPL-MCNC: 3.6 G/DL (ref 3.4–5)
ALKALINE PHOSHATASE: 90 U/L (ref 46–116)
ALT SERPL-CCNC: 23 U/L (ref 16–63)
AST: 26 U/L (ref 15–37)
BACTERIA: (no result)
BASOPHIL: 1 % (ref 0–2)
BILIRUBIN - TOTAL: 0.5 MG/DL (ref 0.2–1)
BILIRUBIN: NEGATIVE MG/DL
BLOOD: NEGATIVE ERY/UL
BUN SERPL-MCNC: 34 MG/DL (ref 7–18)
BUN/CREAT RATIO (CALC): 17.1 RATIO
CHLORIDE: 104 MMOL/L (ref 98–107)
CLARITY UR: CLEAR
CO2 (BICARBONATE): 34 MMOL/L (ref 21–32)
COLOR: YELLOW
CREATININE: 1.99 MG/DL (ref 0.67–1.17)
EOSINOPHIL: 2.2 % (ref 0–7)
GLOBULIN (CALCULATION): 4 G/DL
GLUCOSE (U): NORMAL MG/DL
GLUCOSE SERPL-MCNC: 116 MG/DL (ref 74–106)
HCT: 40.9 % (ref 42–52)
HGB BLD-MCNC: 12.8 G/DL (ref 13.2–18)
LEUKOCYTES: NEGATIVE LEU/UL
LIPASE: 242 U/L (ref 73–393)
LYMPHOCYTE: 9.2 % (ref 15–48)
MCH RBC QN AUTO: 31.2 PG (ref 25–31)
MCHC RBC AUTO-ENTMCNC: 31.3 G/DL (ref 32–36)
MCV: 99.8 FL (ref 78–100)
MONOCYTE: 10.3 % (ref 0–12)
MPV: 10.4 FL (ref 6–9.5)
NEUTROPHIL: 76.8 % (ref 41–80)
NITRITE: NEGATIVE MG/DL
NRBC: 0
PLT: 204 K/UL (ref 150–400)
POTASSIUM: 5 MMOL/L (ref 3.5–5.1)
PROTEIN: (no result) MG/DL
RBC MORPHOLOGY: NORMAL
RBC: 4.1 M/UL (ref 4.7–6)
RDW: 14 % (ref 11.5–14)
SPECIFIC GRAVITY: 1.02 (ref 1–1.03)
SQUAMOUS EPITHELIAL CELLS: (no result)
TOTAL PROTEIN: 7.6 G/DL (ref 6.4–8.2)
URINARY WBC: (no result)
UROBILINOGEN: 0.2 MG/DL (ref 0.2–1)
WBC: 11.2 K/UL (ref 4–10.5)

## 2022-06-13 NOTE — OUTREACH NOTE
"AMBULATORY CASE MANAGEMENT NOTE    Name and Relationship of Patient/Support Person: Karyn Kitchen W - Emergency Contact    Called to follow up on ER visit.  He began with a headache on Wednesday, belly/constipation on Thursday and on Friday still feeling bad, and Karyn saw a bulge she thought in his lower abdomen -so took to ER. CT scan showed hernia.  Feeling better now.      Karyn was wanting an opinion on whether he needs to keep his follow up with vascular since \"he has a terminal diagnosis\" and it is a very long day, taxing on him and has to fast etc.      Education Documentation  No documentation found.        HELEN GARZA  Ambulatory Case Management    6/13/2022, 11:37 EDT  "

## 2022-06-14 NOTE — TELEPHONE ENCOUNTER
Karyn called with a report from her brother Pancho that her dad is complaing of lower right abdominal pain.  CT reviewed with her from 6/10 [place] [facility].  He does have a history of diverticular disease, gallbladder absent.  He is using Miralax and Senna daily since Sun/Monday.  Had good BM today - 30 min ago,  Pain began after he ate lunch.  Requested Rx for Zofran, educated that would be more for n/v and he is complaining of lower abd pain.  He is coming in today to see LIANA José.

## 2022-06-14 NOTE — PROGRESS NOTES
"Chief Complaint  Abdominal Pain (Pt c/o severe stomach pain, pt took 2 ibubprofen, did not help./) and Chest Pain (Pt caregiver stated that pt was c/o chest pains earlier, pt states that he is not currently having chest pains.//)    Subjective     {Problem List  Visit Diagnosis   Encounters  Notes  Medications  Labs  Result Review Imaging  Media :23}     Blaine Morales presents to [facility]  Abdominal Pain  This is a new problem. The current episode started in the past 7 days. The problem occurs intermittently. The problem has been unchanged. Pain location: lower abdomen. The patient is experiencing no pain (no pain currently). The quality of the pain is cramping. The abdominal pain radiates to the RLQ. Associated symptoms include constipation. Pertinent negatives include no fever, hematochezia, melena, nausea or vomiting. Nothing aggravates the pain. The pain is relieved by being still. He has tried acetaminophen for the symptoms. The treatment provided mild relief. Prior diagnostic workup includes CT scan.       Objective   Vital Signs:   /63 (BP Location: Left arm, Patient Position: Sitting)   Pulse 68   Ht 180.3 cm (71\")   Wt 83.2 kg (183 lb 6.4 oz)   BMI 25.58 kg/m²     Physical Exam  Constitutional:       General: He is not in acute distress.     Appearance: Normal appearance. He is normal weight.   HENT:      Head: Normocephalic.   Eyes:      Pupils: Pupils are equal, round, and reactive to light.      Visual Fields: Right eye visual fields normal and left eye visual fields normal.   Neck:      Trachea: Trachea normal.   Cardiovascular:      Rate and Rhythm: Normal rate and regular rhythm.      Heart sounds: Normal heart sounds.   Pulmonary:      Effort: Pulmonary effort is normal.      Breath sounds: Normal air entry. Wheezing (throughout lungs) present.   Abdominal:      General: Abdomen is flat. Bowel sounds are normal. There is no distension.      " Palpations: Abdomen is soft. There is no mass.      Tenderness: There is no abdominal tenderness.   Musculoskeletal:      Right lower leg: No edema.      Left lower leg: No edema.   Skin:     General: Skin is warm and dry.   Neurological:      Mental Status: He is alert and oriented to person, place, and time.   Psychiatric:         Mood and Affect: Mood and affect normal.         Behavior: Behavior normal.         Thought Content: Thought content normal.        Result Review :   The following data was reviewed by: LIANA Aguilar on 06/14/2022:  SCANNED - IMAGING (06/10/2022)                    Assessment and Plan {CC Problem List  Visit Diagnosis   ROS  Review (Popup)  Health Maintenance  Quality  BestPractice  Medications  SmartSets  SnapShot Encounters  Media :23}   Diagnoses and all orders for this visit:    1. Other chest pain (Primary)  -     ECG 12 Lead    2. Lower abdominal pain  -     dicyclomine (BENTYL) 20 MG tablet; Take 0.5 tablets by mouth 3 (Three) Times a Day As Needed (abdominal pain).  Dispense: 30 tablet; Refill: 0    3. Nausea  -     ondansetron (Zofran) 4 MG tablet; Take 1 tablet by mouth Every 8 (Eight) Hours As Needed for Nausea or Vomiting.  Dispense: 15 tablet; Refill: 0    He is currently not having any chest pain.  EKG in office reviewed.  We have discussed that if he has chest pain, he needs to go to the ED immediately where troponins can be drawn.  His daughter verbalized understanding.  We will give him a trial of dicyclomine 10 mg 3 times daily as needed.  We have discussed potential side effects of the medication including constipation.  We will also send in some Zofran, as his daughter states it does help with his issues.      Follow Up   Return for Next scheduled follow up.  Patient was given instructions and counseling regarding his condition or for health maintenance advice. Please see specific information pulled into the AVS if appropriate.

## 2022-06-15 NOTE — PROGRESS NOTES
Procedure     ECG 12 Lead    Date/Time: 6/15/2022 12:47 PM  Performed by: Saira Carrasco MD  Authorized by: Izzy Galvez APRN   Comparison: compared with previous ECG from 8/10/2021  Comparison to previous ECG: LAE, LVH, anterior ST elevation (all stable)  Rhythm: sinus rhythm  Rate: normal  BPM: 61  Conduction: conduction normal  ST Elevation: V1, V2 and V3  ST Depression: II, III and aVF  QRS axis: normal  Other findings: T wave abnormality  Other findings comments: II, III, aVF    Clinical impression: abnormal EKG  Comments: Abnormal but stable from 8/10/2021.  No changes.  H/o MI.  BZM

## 2022-06-22 NOTE — OUTREACH NOTE
AMBULATORY CASE MANAGEMENT NOTE    Name and Relationship of Patient/Support Person: Karyn Kitchne W - Emergency Contact    Called and left message with Karyn to see how the dicyclomine helped with stomach pain.      Education Documentation  No documentation found.        HELEN R  Ambulatory Case Management    6/22/2022, 11:08 EDT   AMBULATORY CASE MANAGEMENT NOTE    Name and Relationship of Patient/Support Person: Karyn Kitchen W - Emergency Contact    Karyn called and left a message stating that her dad is doing well, not having to take the medication all the time.  He has good days/sometime 1/2 days.  He does still have headaches, chronic.  She reports had a great Father's Day.  Will call for with any concerns    Education Documentation  No documentation found.        HELEN R  Ambulatory Case Management    6/23/2022, 09:21 EDT

## 2022-06-27 NOTE — TELEPHONE ENCOUNTER
Caller: [facility] – [place], [place] - [address] [facility] [phone] [facility] [phone]    Relationship: Pharmacy    Best call back number: [phone]    Requested Prescriptions:   Requested Prescriptions     Pending Prescriptions Disp Refills   • metoprolol succinate XL (TOPROL-XL) 25 MG 24 hr tablet 84 tablet 0     Sig: Take 1 tablet by mouth Daily.   • atorvastatin (LIPITOR) 80 MG tablet 84 tablet 0     Sig: Take 1 tablet by mouth Daily.   • escitalopram (LEXAPRO) 5 MG tablet 90 tablet 0     Sig: Take 1 tablet by mouth Daily.   • donepezil (ARICEPT) 10 MG tablet 90 tablet 0     Sig: Take 1 tablet by mouth Daily.   • memantine (NAMENDA) 10 MG tablet 180 tablet 0     Sig: Take 1 tablet by mouth 2 (Two) Times a Day.   • multivitamin with minerals tablet tablet 100 tablet 0     Sig: Take 1 tablet by mouth Daily.   • clopidogrel (PLAVIX) 75 MG tablet 90 tablet 0     Sig: Take 1 tablet by mouth Daily.        Pharmacy where request should be sent: [facility] – [place], [place] - [address] [facility] [phone] [facility] [phone]     Does the patient have less than a 3 day supply:  [x] Yes  [] No    Davy Gleason Rep   06/27/22 09:59 EDT

## 2022-07-05 PROBLEM — M79.604 PAIN IN BOTH LOWER EXTREMITIES: Status: ACTIVE | Noted: 2022-01-01

## 2022-07-05 PROBLEM — M79.605 PAIN IN BOTH LOWER EXTREMITIES: Status: ACTIVE | Noted: 2022-01-01

## 2022-07-05 PROBLEM — Z00.00 MEDICARE ANNUAL WELLNESS VISIT, SUBSEQUENT: Status: ACTIVE | Noted: 2022-01-01

## 2022-07-05 PROBLEM — R51.9 HEADACHE: Status: ACTIVE | Noted: 2022-01-01

## 2022-07-05 NOTE — PROGRESS NOTES
"The ABCs of the Annual Wellness Visit  Subsequent Medicare Wellness Visit    Chief Complaint   Patient presents with   • Medicare Wellness-subsequent   • Hypertension     Follow up   • Hyperlipidemia   • Post-op Peripheral Arterial Bypass   • peripheral artery disease   • Diabetes   • Alzheimer's Disease   • Pain     Pt states he is having frequent headaches and has noticed a sore on the buttocks       Subjective    History of Present Illness:  Blaine Morales is a 88 y.o. male who presents for a Subsequent Medicare Wellness Visit.    The following portions of the patient's history were reviewed and   updated as appropriate: allergies, current medications, past family history, past medical history, past social history, past surgical history and problem list.    Compared to one year ago, the patient feels his physical   health is the same.    Compared to one year ago, the patient feels his mental   health is the same.    Recent Hospitalizations:  He was admitted within the past 365 days at [facility].   Chronic/Acute Health Issues:  Good days/bad days  Still has 24 hr caregivers  HA -daughter reports sometimes he will \"yell out\" reporting pain  Spot on his bottom- blood in underwear at times  Stomach issues seem better on the medication (dicyclomine) given by Kirstie Galvez  They elected not to pursue bx  Some pain in legs too- had used Tramadol with success in past and tolerated    Current Medical Providers:  Patient Care Team:  Ankur Allen MD as PCP - General (Family Medicine)  Naomy Brunson RN as Ambulatory  (Population Health)  Hernandez Benites OD as Consulting Physician (Optometry)  Hernandez Spicer APRN as Nurse Practitioner (Cardiology)  Claudia Gonzales Jr., MD as Consulting Physician (Vascular Surgery)    Outpatient Medications Prior to Visit   Medication Sig Dispense Refill   • amLODIPine (NORVASC) 2.5 MG tablet Take 1 tablet by mouth Every Night. 28 tablet 2   • " aspirin (aspirin) 81 MG EC tablet Take 1 tablet by mouth Daily. 28 tablet 2   • atorvastatin (LIPITOR) 80 MG tablet Take 1 tablet by mouth Daily. 84 tablet 0   • bumetanide (BUMEX) 2 MG tablet Take 1 tablet by mouth Daily. 30 tablet 0   • clopidogrel (PLAVIX) 75 MG tablet Take 1 tablet by mouth Daily. 90 tablet 0   • dicyclomine (BENTYL) 20 MG tablet Take 0.5 tablets by mouth 3 (Three) Times a Day As Needed (abdominal pain). 30 tablet 0   • donepezil (ARICEPT) 10 MG tablet Take 1 tablet by mouth Daily. 90 tablet 0   • escitalopram (LEXAPRO) 5 MG tablet Take 1 tablet by mouth Daily. 90 tablet 0   • memantine (NAMENDA) 10 MG tablet Take 1 tablet by mouth 2 (Two) Times a Day. 180 tablet 0   • metoprolol succinate XL (TOPROL-XL) 25 MG 24 hr tablet Take 1 tablet by mouth Daily. 84 tablet 0   • multivitamin with minerals tablet tablet Take 1 tablet by mouth Daily. 100 tablet 0   • ondansetron (Zofran) 4 MG tablet Take 1 tablet by mouth Every 8 (Eight) Hours As Needed for Nausea or Vomiting. 15 tablet 0   • polyethylene glycol (MIRALAX) 17 GM/SCOOP powder ONCE A DAY as needed for CONSTIPATION     • promethazine (PHENERGAN) 12.5 MG tablet Take 12.5 mg by mouth Every 6 (Six) Hours As Needed.     • QUEtiapine (SEROquel) 25 MG tablet Take 0.5 tablets by mouth Every Night. 14 tablet 2   • sennosides-docusate (PERICOLACE) 8.6-50 MG per tablet Twice Daily as needed for CONSTIPATION       No facility-administered medications prior to visit.       Opioid medication/s are on active medication list.  and I have evaluated his active treatment plan and pain score trends (see table).  There were no vitals filed for this visit.  I have reviewed the chart for potential of high risk medication and harmful drug interactions in the elderly.            Aspirin is on active medication list. Aspirin use is indicated based on review of current medical condition/s. Pros and cons of this therapy have been discussed today. Benefits of this  "medication outweigh potential harm.  Patient has been encouraged to continue taking this medication.  .      Patient Active Problem List   Diagnosis   • Acute pain of right shoulder   • Trigger point of right shoulder region   • Bilateral carotid artery stenosis   • CKD (chronic kidney disease) stage 3, GFR 30-59 ml/min ([facility])   • Arteriosclerosis of coronary artery   • Alzheimer disease ([facility])   • Hypertension   • Hyperlipidemia   • Abnormal weight loss   • Aortic aneurysm ([facility])   • Aortic valve stenosis   • Diverticulitis   • Dyspnea   • Echocardiogram abnormal   • Edema   • Gastric ulcer   • Heart murmur   • High prostate specific antigen (PSA)   • Type 2 diabetes mellitus ([facility])   • Hyperkalemia   • 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 ([facility])   • Obesity (BMI 30-39.9)   • PAD (peripheral artery disease) ([facility])   • Peripheral nerve disease   • Pneumonia   • Presence of aortocoronary bypass graft   • Presence of prosthetic heart valve   • Right carotid artery occlusion   • Stenosis of carotid artery   • Stenosis of left subclavian artery ([facility])   • Systolic murmur   • Lung nodule   • Pressure injury of left buttock, stage 1   • History of smoking   • CHF (congestive heart failure) ([facility])   • Abnormal CT of the chest   • Medicare annual wellness visit, subsequent   • Headache   • Pain in both lower extremities     Advance Care Planning  Advance Directive is on file.  ACP discussion was held with the patient during this visit. Patient has an advance directive in EMR which is still valid.           Objective    Vitals:    07/05/22 1132   BP: 146/72   BP Location: Right arm   Patient Position: Sitting   Cuff Size: Adult   Pulse: 84   SpO2: 92%  Comment: Room air   Weight: 83.6 kg (184 lb 6.4 oz)   Height: 180.3 cm (71\")     Estimated body mass index is 25.72 kg/m² as calculated from the following:    Height as of this encounter: 180.3 cm (71\").    Weight as of this encounter: 83.6 kg (184 " lb 6.4 oz).    BMI is >= 25 and <30. (Overweight) The following options were offered after discussion;: none (medical contraindication)      Does the patient have evidence of cognitive impairment? Yes    Physical Exam   He seemed to be asleep when I first entered the room  But he became alert and conversant immediately.  Was not in any acute distress  Did not appear to be in pain, although at 1 point reported headache but a few minutes later he said he had no pain  Tympanic membrane's clear  Funduscopic exam unremarkable  Nontender to palpation over the frontal or maxillary sinus  Oropharynx is clear  No cervical or supraclavicular adenopathy  Heart is regular rhythm with a 2/6 to 3/6 systolic murmur  Lungs had good air movement with a few crackles in the bases no wheezes  Abdomen bowel sounds were positive abdomen is soft and nontender no masses rebound or guarding  Lower extremities are significant for trace pedal edema at the ankles.  He does have globular knees bilaterally without increased warmth or redness.  Neurologic without lateralizing focal neurologic deficits.  He does have cognitive/memory deficits relies on his daughter and caretaker to provide details  Right buttock small scab area present.  Looks like he may have created the area previously.      Lab Results   Component Value Date    TRIG 192 (H) 2022    HDL 41 2022    LDL 80 2022    VLDL 32 2022    HGBA1C 7.20 (H) 2022            HEALTH RISK ASSESSMENT    Smoking Status:  Social History     Tobacco Use   Smoking Status Former Smoker   • Packs/day: 1.00   • Years: 47.00   • Pack years: 47.00   • Types: Cigarettes   • Start date:    • Quit date:    • Years since quittin.5   Smokeless Tobacco Never Used     Alcohol Consumption:  Social History     Substance and Sexual Activity   Alcohol Use Not Currently     Fall Risk Screen:    STEADI Fall Risk Assessment was completed, and patient is at MODERATE risk for  falls. Assessment completed on:7/5/2022    Depression Screening:  PHQ-2/PHQ-9 Depression Screening 7/5/2022   Retired PHQ-9 Total Score -   Retired Total Score -   Little Interest or Pleasure in Doing Things 1-->several days   Feeling Down, Depressed or Hopeless 0-->not at all   Trouble Falling or Staying Asleep, or Sleeping Too Much -   Feeling Tired or Having Little Energy -   Poor Appetite or Overeating -   Feeling Bad about Yourself - or that You are a Failure or Have Let Yourself or Your Family Down -   Trouble Concentrating on Things, Such as Reading the Newspaper or Watching Television -   Moving or Speaking So Slowly that Other People Could Have Noticed? Or the Opposite - Being So Fidgety -   Thoughts that You Would be Better Off Dead or of Hurting Yourself in Some Way -   PHQ-9: Brief Depression Severity Measure Score 1   If You Checked Off Any Problems, How Difficult Have These Problems Made It For You to Do Your Work, Take Care of Things at Home, or Get Along with Other People? -       Health Habits and Functional and Cognitive Screening:  Functional & Cognitive Status 7/5/2022   Do you have difficulty preparing food and eating? Yes   Do you have difficulty bathing yourself, getting dressed or grooming yourself? Yes   Do you have difficulty using the toilet? Yes   Do you have difficulty moving around from place to place? Yes   Do you have trouble with steps or getting out of a bed or a chair? No   Current Diet Well Balanced Diet   Dental Exam Up to date   Eye Exam Up to date   Exercise (times per week) 0 times per week   Current Exercises Include No Regular Exercise   Do you need help using the phone?  No   Are you deaf or do you have serious difficulty hearing?  No   Do you need help with transportation? No   Do you need help shopping? Yes   Do you need help preparing meals?  Yes   Do you need help with housework?  Yes   Do you need help with laundry? No   Do you need help taking your medications? No    Do you need help managing money? No   Do you ever drive or ride in a car without wearing a seat belt? No   Have you felt unusual stress, anger or loneliness in the last month? No   Who do you live with? Alone   If you need help, do you have trouble finding someone available to you? No   Have you been bothered in the last four weeks by sexual problems? No   Do you have difficulty concentrating, remembering or making decisions? Yes       Age-appropriate Screening Schedule:  Refer to the list below for future screening recommendations based on patient's age, sex and/or medical conditions. Orders for these recommended tests are listed in the plan section. The patient has been provided with a written plan.    Health Maintenance   Topic Date Due   • URINE MICROALBUMIN  Never done   • ZOSTER VACCINE (1 of 2) Never done   • INFLUENZA VACCINE  10/01/2022   • HEMOGLOBIN A1C  11/11/2022   • DIABETIC EYE EXAM  04/25/2023   • LIPID PANEL  05/11/2023   • TDAP/TD VACCINES (2 - Td or Tdap) 05/26/2026              Assessment & Plan   CMS Preventative Services Quick Reference  Risk Factors Identified During Encounter  Dementia/Memory   The above risks/problems have been discussed with the patient.  Follow up actions/plans if indicated are seen below in the Assessment/Plan Section.  Pertinent information has been shared with the patient in the After Visit Summary.    Diagnoses and all orders for this visit:    1. Alzheimer disease (HCC) (Primary)  Assessment & Plan:  Continue on his current medical regimen.  He receives excellent supportive care.      2. Mixed hyperlipidemia  Assessment & Plan:  Lipids look okay continue on current regimen      3. Type 2 diabetes mellitus with other circulatory complication, without long-term current use of insulin (HCC)  Assessment & Plan:  His last hemoglobin A1c 7.2.  He is actually doing quite well and we are really not looking for tight control.  Continue on his current regimen.      4. Primary  hypertension  Assessment & Plan:  Blood pressure is borderline.  Continue on current regimen      5. 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 PAD (peripheral artery disease) ([facility])  Assessment & Plan:  As above    Orders:  -     traMADol (ULTRAM) 50 MG tablet; Take 1 tablet by mouth Every 8 (Eight) Hours As Needed for Moderate Pain .  Dispense: 20 tablet; Refill: 0    7. Stage 3b chronic kidney disease (HCC)  Assessment & Plan:  Last lab results actually show slight improvement in his creatinine.  Continue on his current regimen.  Avoid nephrotoxins      8. Chronic combined systolic and diastolic congestive heart failure ([facility])  Assessment & Plan:  He seems fairly well compensated at the present.  We will continue on current regimen      9. Pain in both lower extremities  Assessment & Plan:  Given the presence of his pain in the legs, history of peripheral artery disease, history of spinal disease, and recently reported headaches we will go ahead and make tramadol available to him again.  Patient is being treated in a palliative fashion anyway.    Orders:  -     POC Urine Drug Screen [facility]  -     traMADol (ULTRAM) 50 MG tablet; Take 1 tablet by mouth Every 8 (Eight) Hours As Needed for Moderate Pain .  Dispense: 20 tablet; Refill: 0    10. Long-term use of high-risk medication  -     POC Urine Drug Screen [facility]      Follow Up:   No follow-ups on file.     An After Visit Summary and PPPS were made available to the patient.

## 2022-07-11 NOTE — ASSESSMENT & PLAN NOTE
His last hemoglobin A1c 7.2.  He is actually doing quite well and we are really not looking for tight control.  Continue on his current regimen.

## 2022-07-11 NOTE — ASSESSMENT & PLAN NOTE
Given the presence of his pain in the legs, history of peripheral artery disease, history of spinal disease, and recently reported headaches we will go ahead and make tramadol available to him again.  Patient is being treated in a palliative fashion anyway.

## 2022-07-11 NOTE — ASSESSMENT & PLAN NOTE
Last lab results actually show slight improvement in his creatinine.  Continue on his current regimen.  Avoid nephrotoxins

## 2022-08-02 NOTE — OUTREACH NOTE
"AMBULATORY CASE MANAGEMENT NOTE    Name and Relationship of Patient/Support Person: Karyn Kitchen W - Emergency Contact    Karyn called stating that her dad was complaining of headache x 24 hours and some \"chest pain\" not sure if chest wall with lung cancer.  He is only taking the Tramadol PRN.  Advised to take scheduled and report back.  Will need new Rx to [facility].  She will call with any other reports.        HELEN GARZA  Ambulatory Case Management    8/2/2022, 10:44 EDT  "

## 2022-08-03 PROBLEM — R07.89 OTHER CHEST PAIN: Status: ACTIVE | Noted: 2022-01-01

## 2022-08-03 NOTE — PROGRESS NOTES
Chief Complaint  Headache, Chest Pain (Pt is having headache and chest pains for 3 days today. ), and Balance Issues    Subjective          Blaine Morales presents to [facility]  History of Present Illness    Here with his daughter Karyn  Had been reporting issues with headache, but at the time of our visit does not seem to be in great distress  His blood pressure has been noted to be elevated  At times will also report having some chest discomfort.  At times will have some abdominal cramping  Had been given a prescription for Bentyl that seems to be helpful          Current Outpatient Medications   Medication Sig Dispense Refill   • amLODIPine (NORVASC) 5 MG tablet Take 1 tablet by mouth Every Night. 28 tablet 2   • aspirin (aspirin) 81 MG EC tablet Take 1 tablet by mouth Daily. 28 tablet 2   • atorvastatin (LIPITOR) 80 MG tablet Take 1 tablet by mouth Daily. 84 tablet 0   • bumetanide (BUMEX) 2 MG tablet Take 1 tablet by mouth Daily. 30 tablet 0   • clopidogrel (PLAVIX) 75 MG tablet Take 1 tablet by mouth Daily. 90 tablet 0   • dicyclomine (BENTYL) 20 MG tablet Take 0.5 tablets by mouth 3 (Three) Times a Day As Needed (abdominal pain). 30 tablet 0   • donepezil (ARICEPT) 10 MG tablet Take 1 tablet by mouth Daily. 90 tablet 0   • escitalopram (LEXAPRO) 5 MG tablet Take 1 tablet by mouth Daily. 90 tablet 0   • memantine (NAMENDA) 10 MG tablet Take 1 tablet by mouth 2 (Two) Times a Day. 180 tablet 0   • metoprolol succinate XL (TOPROL-XL) 25 MG 24 hr tablet Take 1 tablet by mouth Daily. 84 tablet 0   • multivitamin with minerals tablet tablet Take 1 tablet by mouth Daily. 100 tablet 0   • ondansetron (Zofran) 4 MG tablet Take 1 tablet by mouth Every 8 (Eight) Hours As Needed for Nausea or Vomiting. 15 tablet 0   • polyethylene glycol (MIRALAX) 17 GM/SCOOP powder ONCE A DAY as needed for CONSTIPATION     • QUEtiapine (SEROquel) 25 MG tablet Take 0.5 tablets by mouth Every Night.  "14 tablet 2   • sennosides-docusate (PERICOLACE) 8.6-50 MG per tablet Twice Daily as needed for CONSTIPATION     • traMADol (ULTRAM) 50 MG tablet Take 1 tablet by mouth Every 8 (Eight) Hours As Needed for Moderate Pain . 90 tablet 0   • promethazine (PHENERGAN) 12.5 MG tablet Take 12.5 mg by mouth Every 6 (Six) Hours As Needed.       Current Facility-Administered Medications   Medication Dose Route Frequency Provider Last Rate Last Admin   • cloNIDine (CATAPRES) tablet 0.1 mg  0.1 mg Oral Q12H Ankur Allen MD   0.1 mg at 08/03/22 1133       Review of Systems         Objective   Vital Signs:   /62 Comment: by huyen right arm  Pulse 53   Temp 98.6 °F (37 °C)   Ht 180.3 cm (71\")   Wt 82.6 kg (182 lb)   SpO2 97%   BMI 25.38 kg/m²     Physical Exam   Does not appear to be in acute distress  Funduscopic exam is normal  Tympanic membranes clear  Oropharynx clear  Heart is regular rhythm with 3/6 to 4/6 systolic murmur  Lungs are bilaterally clear  Abdomen has positive bowel sounds, soft and nontender.  No palpable mass.  No organomegaly  Neurologic without lateralizing focal deficits      Result Review :                ECG 12 Lead    Date/Time: 8/3/2022 11:47 AM  Performed by: Ankur Allen MD  Authorized by: Ankur Allen MD   Comparison: compared with previous ECG from 6/14/2022  Similar to previous ECG  Rhythm: sinus rhythm  Ectopy: atrial premature contractions  Rate: normal  Conduction: conduction normal  T Waves: T waves normal  QRS axis: normal  Other findings: non-specific ST-T wave changes    Clinical impression: non-specific ECG              Assessment and Plan    Diagnoses and all orders for this visit:    1. Chronic nonintractable headache, unspecified headache type (Primary)  Comments:  Is possible his headache could be due to his elevated blood pressure.  Vice versa is also possible.    2. Primary hypertension  Comments:  When he increase his amlodipine to 5 mg " daily  Orders:  -     cloNIDine (CATAPRES) tablet 0.1 mg    3. Alzheimer disease (HCC)  Comments:  Continue on his current medication and supportive care    4. Chronic combined systolic and diastolic congestive heart failure (HCC)  Comments:  Seems well compensated from a CHF standpoint    5. Stage 3b chronic kidney disease (HCC)    6. PAD (peripheral artery disease) ([facility])    7. Other chest pain  Comments:  With his reported chest pain EKG rule out acute ischemia.  EKG looks okay.  Continue on his current regimen  Orders:  -     ECG 12 Lead    8. Hypertension, unspecified type  -     amLODIPine (NORVASC) 5 MG tablet; Take 1 tablet by mouth Every Night.  Dispense: 28 tablet; Refill: 2    9. Lower abdominal pain  Comments:  We will continue with the Bentyl for as needed use  Orders:  -     dicyclomine (BENTYL) 20 MG tablet; Take 0.5 tablets by mouth 3 (Three) Times a Day As Needed (abdominal pain).  Dispense: 30 tablet; Refill: 0        Follow Up   No follow-ups on file.  Patient was given instructions and counseling regarding his condition or for health maintenance advice. Please see specific information pulled into the AVS if appropriate.

## 2022-08-03 NOTE — TELEPHONE ENCOUNTER
PA for Tramadol sent to plan via CoverMyMeds    Key: [id number]    Form  [employer] Electronic PA Form (2017 [employer])

## 2022-08-03 NOTE — TELEPHONE ENCOUNTER
As long as you remain covered by your prescription drug plan and there are no changes to your  plan benefits, this request is approved from 08/03/2022 to 01/30/2023.

## 2022-08-04 NOTE — TELEPHONE ENCOUNTER
"Karyn called to ask about a prescription that was sent and a prescription that they were supposed to receive.  They received a prescription for Memantine and should have been Tramadol.  The label was placed on the wrong bottle and they did not receive the increase in the Amlodipine.  Spoke with Verna at [facility] and she will \"fix\" the error and reach out to Karyn with correction.    "

## 2022-08-19 PROBLEM — L60.2 NAIL HYPERTROPHY: Status: ACTIVE | Noted: 2022-01-01

## 2022-08-19 PROBLEM — K59.00 CONSTIPATION: Status: ACTIVE | Noted: 2022-01-01

## 2022-08-19 NOTE — PROGRESS NOTES
Chief Complaint  Hypertension (3 month f/u), Hyperlipidemia, Post-op Peripheral Arterial Bypass, peripheral artery disease, Chronic Kidney Disease, Diabetes, Headache (Pt is on tramadol for these but pt states it's not seeming to help), and Chest Pain (Pt was having some chest pain yesterday )    Subjective          Blaine Morales presents to [facility]  History of Present Illness  Here with his daughter Karyn and his granddaughter.  They report that he is continue to have issues with headache times.  His daughter is very concerned about reported pain.  At the time of her visit patient seems comfortable and kind of downplays the pain issue.  At last visit increased the Amlodipine to 5 mg dialy that evidently has been helpful with keeping his blood pressure under better control.  Yesterday he also had an episode where he was reporting chest pain.  Hook is not doing good.   Having HA still  Also had Chest Pain episode yesterday that had son quite concerned.       Current Outpatient Medications   Medication Sig Dispense Refill   • amLODIPine (NORVASC) 5 MG tablet Take 1 tablet by mouth Every Night. 28 tablet 2   • aspirin (aspirin) 81 MG EC tablet Take 1 tablet by mouth Daily. 28 tablet 2   • atorvastatin (LIPITOR) 80 MG tablet Take 1 tablet by mouth Daily. 84 tablet 0   • bumetanide (BUMEX) 2 MG tablet Take 1 tablet by mouth Daily. 30 tablet 0   • clopidogrel (PLAVIX) 75 MG tablet Take 1 tablet by mouth Daily. 90 tablet 0   • donepezil (ARICEPT) 10 MG tablet Take 1 tablet by mouth Daily. 90 tablet 0   • escitalopram (LEXAPRO) 5 MG tablet Take 1 tablet by mouth Daily. 90 tablet 0   • memantine (NAMENDA) 10 MG tablet Take 1 tablet by mouth 2 (Two) Times a Day. 180 tablet 0   • metoprolol succinate XL (TOPROL-XL) 25 MG 24 hr tablet Take 1 tablet by mouth Daily. 84 tablet 0   • multivitamin with minerals tablet tablet Take 1 tablet by mouth Daily. 100 tablet 0   • ondansetron (Zofran)  "4 MG tablet Take 1 tablet by mouth Every 8 (Eight) Hours As Needed for Nausea or Vomiting. 15 tablet 0   • polyethylene glycol (MIRALAX) 17 GM/SCOOP powder Take 17 g by mouth Daily As Needed (constipation). 238 g 1   • promethazine (PHENERGAN) 12.5 MG tablet Take 12.5 mg by mouth Every 6 (Six) Hours As Needed.     • QUEtiapine (SEROquel) 25 MG tablet Take 0.5 tablets by mouth Every Night. 14 tablet 2   • sennosides-docusate (PERICOLACE) 8.6-50 MG per tablet Twice Daily as needed for CONSTIPATION     • traMADol (ULTRAM) 50 MG tablet Take 1 tablet by mouth Every 6 (Six) Hours As Needed for Moderate Pain . 1 tablet 0   • dicyclomine (BENTYL) 20 MG tablet Take 0.5 tablets by mouth 3 (Three) Times a Day As Needed (abdominal pain). 30 tablet 0     Current Facility-Administered Medications   Medication Dose Route Frequency Provider Last Rate Last Admin   • cloNIDine (CATAPRES) tablet 0.1 mg  0.1 mg Oral Q12H Ankur Allen MD   0.1 mg at 08/03/22 1133       Review of Systems         Objective   Vital Signs:   /82 (BP Location: Left arm, Patient Position: Sitting, Cuff Size: Adult)   Pulse 76   Temp 97.5 °F (36.4 °C) (Oral)   Ht 180.3 cm (71\")   Wt 81.3 kg (179 lb 3.2 oz)   SpO2 92% Comment: Room air  BMI 24.99 kg/m²     Physical Exam  Constitutional:       Appearance: Normal appearance.   Neck:      Vascular: No carotid bruit.   Cardiovascular:      Rate and Rhythm: Normal rate and regular rhythm.      Heart sounds: Normal heart sounds. No murmur heard.  Pulmonary:      Effort: No respiratory distress.      Breath sounds: No wheezing or rales.   Musculoskeletal:      Right lower leg: No edema.      Left lower leg: No edema.   Lymphadenopathy:      Cervical: No cervical adenopathy.   Skin:     Comments: He does have a thickened discolored and dystrophic toenails   Neurological:      General: No focal deficit present.      Mental Status: He is alert.   Psychiatric:         Attention and Perception: " Attention normal.         Mood and Affect: Mood normal.         Speech: Speech normal.            Result Review :                     Assessment and Plan    Diagnoses and all orders for this visit:    1. Alzheimer disease (HCC) (Primary)  Assessment & Plan:  His dementia is quite evident but he remains conversational to a limited extent.  Answers to questions are only a few words      2. Type 2 diabetes mellitus with other circulatory complication, without long-term current use of insulin ([facility])  Assessment & Plan:  His last hemoglobin A1c look good.  Were not looking for tight blood sugar control just wanted keep him out of the extremes    Orders:  -     Comprehensive Metabolic Panel    3. Primary hypertension  Assessment & Plan:  Blood pressure continues to run a little high, but is improved.  Since we just increase his amlodipine to 5 mg about 2 weeks ago and we will hold off on making a change today.    Orders:  -     CBC & Differential  -     Comprehensive Metabolic Panel    4. Other chest pain  Assessment & Plan:  We know he has had chest x-ray findings consistent with possible neoplasm.  Go ahead and repeat chest x-ray for follow-up.  They have made the decision not to pursue further work-up or interventions.  For now I will increase his tramadol to every 6 hours as needed    Orders:  -     XR Chest PA & Lateral    5. Abnormal CT of the chest  Comments:  As above.  We will get the follow-up chest x-ray  Orders:  -     XR Chest PA & Lateral    6. PAD (peripheral artery disease) ([facility])  Comments:  Does not really sound like he is having much issue with claudication.  Will make tramadol available every 6 hours if needed.  Orders:  -     traMADol (ULTRAM) 50 MG tablet; Take 1 tablet by mouth Every 6 (Six) Hours As Needed for Moderate Pain .  Dispense: 1 tablet; Refill: 0    7. Pain in both lower extremities  -     traMADol (ULTRAM) 50 MG tablet; Take 1 tablet by mouth Every 6 (Six) Hours As Needed for Moderate Pain  .  Dispense: 1 tablet; Refill: 0    8. Drug-induced constipation  -     polyethylene glycol (MIRALAX) 17 GM/SCOOP powder; Take 17 g by mouth Daily As Needed (constipation).  Dispense: 238 g; Refill: 1    9. Nail hypertrophy  Comments:  His nails are thicker than what my tremors will be able to handle.  We will refer him to podiatry  Orders:  -     Ambulatory Referral to Podiatry      Follow Up   No follow-ups on file.  Patient was given instructions and counseling regarding his condition or for health maintenance advice. Please see specific information pulled into the AVS if appropriate.

## 2022-08-23 NOTE — ASSESSMENT & PLAN NOTE
His dementia is quite evident but he remains conversational to a limited extent.  Answers to questions are only a few words

## 2022-08-23 NOTE — ASSESSMENT & PLAN NOTE
His last hemoglobin A1c look good.  Were not looking for tight blood sugar control just wanted keep him out of the extremes

## 2022-08-23 NOTE — ASSESSMENT & PLAN NOTE
We know he has had chest x-ray findings consistent with possible neoplasm.  Go ahead and repeat chest x-ray for follow-up.  They have made the decision not to pursue further work-up or interventions.  For now I will increase his tramadol to every 6 hours as needed

## 2022-08-23 NOTE — ASSESSMENT & PLAN NOTE
Blood pressure continues to run a little high, but is improved.  Since we just increase his amlodipine to 5 mg about 2 weeks ago and we will hold off on making a change today.

## 2022-09-06 NOTE — OUTREACH NOTE
AMBULATORY CASE MANAGEMENT NOTE    Name and Relationship of Patient/Support Person: Karyn Kitchen W - Emergency Contact    Called to check in with Karyn/MEGHAN.  She did receive the letter regarding Hospice transition, but they have not sat down together as a family to discuss.  So far he is doing well on his own and she knows that is going to be a reality, but not at this moment are they ready.    Karyn was on her way over to see her dad as we spoke.  He was not feeling the greatest.  She will call with any needs/concerns.     HELEN GARZA  Ambulatory Case Management    9/6/2022, 12:34 EDT

## 2022-09-07 NOTE — PROGRESS NOTES
"Chief Complaint  Cough (Pt c/o chest and head congestion x 4 days) Daughter refused testing.     Subjective        Blaine Morales presents to [facility]  Cough  This is a new problem. The current episode started in the past 7 days. The cough is non-productive. Associated symptoms include chills, a fever, headaches, nasal congestion and postnasal drip. Pertinent negatives include no shortness of breath. Nothing aggravates the symptoms. He has tried nothing for the symptoms. The treatment provided no relief.       Objective   Vital Signs:  /65 (BP Location: Left arm, Patient Position: Sitting)   Pulse 85   Temp 97.9 °F (36.6 °C) (Oral)   Ht 180.3 cm (71\")   Wt 78.9 kg (174 lb)   SpO2 94%   BMI 24.27 kg/m²   Estimated body mass index is 24.27 kg/m² as calculated from the following:    Height as of this encounter: 180.3 cm (71\").    Weight as of this encounter: 78.9 kg (174 lb).    BMI is within normal parameters. No other follow-up for BMI required.      Physical Exam  HENT:      Head: Normocephalic.      Nose:      Right Sinus: Frontal sinus tenderness present.      Left Sinus: Frontal sinus tenderness present.   Cardiovascular:      Rate and Rhythm: Normal rate.   Pulmonary:      Effort: Pulmonary effort is normal. No respiratory distress.      Breath sounds: No stridor. No wheezing, rhonchi or rales.   Skin:     General: Skin is warm and dry.   Neurological:      Mental Status: He is alert. Mental status is at baseline.   Psychiatric:         Mood and Affect: Mood normal.        Result Review :                Assessment and Plan   Diagnoses and all orders for this visit:    1. Acute non-recurrent maxillary sinusitis (Primary)  -     azithromycin (Zithromax Z-Kennedy) 250 MG tablet; Take 2 tablets by mouth on day 1, then 1 tablet daily on days 2-5  Dispense: 6 tablet; Refill: 0             Follow Up   Return if symptoms worsen or fail to improve.  Patient was given " instructions and counseling regarding his condition or for health maintenance advice. Please see specific information pulled into the AVS if appropriate.

## 2022-09-15 NOTE — PROGRESS NOTES
[address] [facility], [address] [place], [address] [facility], [address] [facility]           Dept Phone: [phone]           Dept Fax:  [address], [place]          Dept Phone: [phone]           Dept Fax:  [phone]    Chief Compliant:  Chief Complaint   Patient presents with    Wrist Pain     R wrist        Subjective:       Irlanda Avitia is a 15 y.o. right hand-dominant male here for evaluation and treatment of a left wrist injury. The injury occurred on 8/18/2022. Mechanism of injury was: [address] during blocking with football practice. Patient was evaluated in the ED after the injury occurred on 8/18/2022 where he was found to have a concussion he also had x-rays of the left wrist concerning for buckle fracture of the left distal radius. He was placed in a volar wrist splint at that time and instructed to follow-up with orthopedics. However over the last 4 weeks patient reports he has not seen any orthopedics or his primary care. He does admit that he did wear the splint for about 2 weeks but over the last 2 weeks he has been out of the splint since he has had been pain-free. He has not returned to football participation and is here for follow-up and to discuss possible return to play as his wrist feels at baseline. He denies any pain no numbness no tingling no fever chills. Outside reports reviewed: ER records and x-ray reports. Patient's medications, allergies, past medical, surgical, social and family histories were reviewed and updated as appropriate. Review of Systems  Review of Systems   Constitutional: Negative for fever, chills, sweats, recent illness, or recent injury. Neurological: Negative for headaches, numbness, or weakness.    Integumentary: Negative for rash, itching, ecchymosis, abrasions, or NEW VISIT    Patient: Blaine Morales  ?  YOB: 1933    MRN: [id number]  ?  Chief Complaint   Patient presents with   • Right Shoulder - Pain   • Establish Care      ?  HPI:   Blaine Morales is a 87 y.o. male who presents for complaint of left shoulder pain.  He is accompanied by his son for today's visit.  He reports onset of right shoulder pain about 4-6 weeks ago with a significant increase in pain since yesterday.  He reports his symptoms started after he raises his arm to wave to someone.  He denies a decrease in range of motion.    Pain Location: RIGHT shoulder   Radiation: Into neck  Quality: stabbing  Intensity/Severity: severe  Duration: 4-6 weeks  Progression of symptoms: yes, progressive worsening  Onset quality: sudden  Timing: constant  Aggravating Factors: overhead reaching, reaching forward, reaching across body, Rotation of the head tilt to the left  Alleviating Factors: none   Previous Episodes: none mentioned  Associated Symptoms: pain, decreased ROM  ADLs Affected: grooming/hygiene/toileting/personal care, recreational activities/sports  Previous Treatment: none     This patient is a new patient.  This problem is new to this examiner.      Allergies: No Known Allergies    Medications:   Home Medications:  Current Outpatient Medications on File Prior to Visit   Medication Sig   • allopurinol (ZYLOPRIM) 100 MG tablet    • amLODIPine (NORVASC) 5 MG tablet    • bumetanide (BUMEX) 0.5 MG tablet    • clopidogrel (PLAVIX) 75 MG tablet    • gabapentin (NEURONTIN) 300 MG capsule    • hydrOXYzine (ATARAX) 25 MG tablet    • metoprolol succinate XL (TOPROL-XL) 50 MG 24 hr tablet    • pravastatin (PRAVACHOL) 40 MG tablet    • QUEtiapine (SEROquel) 25 MG tablet      No current facility-administered medications on file prior to visit.      Current Medications:  Scheduled Meds:  PRN Meds:.    I have reviewed the patient's medical history in detail and updated the computerized patient record.   "Review and summarization of old records include:    Past Medical History:   Diagnosis Date   • Alzheimer disease (CMS/HCC)    • Diabetes (CMS/HCC)    • Skin cancer (melanoma) (CMS/[facility])      History reviewed. No pertinent surgical history.  Social History     Occupational History   • Not on file   Tobacco Use   • Smoking status: Never Smoker   • Smokeless tobacco: Never Used   Substance and Sexual Activity   • Alcohol use: Defer   • Drug use: Defer   • Sexual activity: Defer      Family History   Problem Relation Age of Onset   • Dislocations Other    • Hypertension Other          Review of Systems  Constitutional: Negative.    HENT: Negative.    Eyes: Negative.    Respiratory: Negative.    Cardiovascular: Negative.    Endocrine: Negative.    Musculoskeletal: Positive for arthralgias, decreased ROM, decreased strength.  Skin: Negative.    Allergic/Immunologic: Negative.    Neurological: Negative    Hematological: Negative.    Psychiatric/Behavioral: Negative.           Wt Readings from Last 3 Encounters:   11/09/20 81.6 kg (180 lb)     Ht Readings from Last 3 Encounters:   11/09/20 180.3 cm (71\")     Body mass index is 25.1 kg/m².  Facility age limit for growth percentiles is 20 years.  Vitals:    11/09/20 0939   Temp: 98 °F (36.7 °C)         Physical Exam  Constitutional: Patient is oriented to person, place, and time. Appears well-developed and well-nourished.   HENT:   Head: Normocephalic and atraumatic.   Eyes: Conjunctivae and EOM are normal. Pupils are equal, round, and reactive to light.   Cardiovascular: Normal rate and intact distal pulses.   Pulmonary/Chest: Effort normal.   Musculoskeletal:   See detailed exam below   Neurological: Alert and oriented to person, place, and time. No sensory deficit. Coordination normal.   Skin: Skin is warm and dry. Capillary refill takes less than 2 seconds. No rash noted. No erythema.   Psychiatric: Patient has a normal mood and affect. His behavior is normal. Judgment and " laceration. Musculoskeletal: Positive for Wrist Pain (R wrist)        Objective:   Physical Exam:  Constitutional: Patient is oriented to person, place, and time. Patient appears well-developed and well nourished. Musculoskeletal:       General:   alert, appears stated age, and cooperative   Gait:    Normal   Left Wrist  Circulation:   warm, well perfused, brisk capillary refill distal to the injury   Skin:   No evidence of erythema, ecchymosis, abrasions or lacerations. Swelling:  No swelling was present in the hand   Deformity:  There is not an obvious deformity of the hand. Finger ROM:   normal   Wrist ROM:  wrist flexion and extension was within normal limits and symmetric to contralateral hand   Sensation:   intact to light touch   Tenderness:   No tenderness whatsoever to the distal radius fracture site. No tenderness to the radial or ulnar styloid. Neurological: Patient is alert and oriented to person, place, and time. Normal strenght. No sensory deficit. Skin: Skin is warm and dry  Psychiatric: Behavior is normal. Thought content normal.  Nursing note and vitals reviewed. Imaging  Labs and Imaging:     XR taken today:  No results found. X-rays taken in clinic and preliminarily reviewed by me 9/15/22:  3 views of the left wrist demonstrate well-healed distal radius buckle fracture. Significant bony callus noted. Distal radial and distal ulnar physis are maintained without evidence of involvement or widening. Assessment:     1. Left wrist pain    2. Closed torus fracture of distal end of left radius, initial encounter               Plan: This is a 15 y.o. male who presents to the clinic today for evaluation of left distal/distal radius buckle fracture. Date of injury occurred on 8/18/2022 during football practice. He was evaluated in the ED that day where x-rays were done revealing the buckle fracture patient was placed in a volar wrist splint at that time.   Patient does present today having delayed follow-up however reports he is doing excellent today and pain-free. He does admit that he wore the volar wrist splint from about 2 weeks has been out of the the splint for the last 2 weeks without any pain. 1.  Radiographically patient demonstrates excellent alignment and evidence of significant healing. 2.  Clinically patient has no tenderness on examination regained full range of motion excellent strength. 3.  Patient may return to football but would strongly advise wearing a cock up wrist splint which is provided to him today. Instructed to wear this until 9/30/2022 which they are agreeable with. 4.  Follow-up on PRN basis. thought content normal.   Nursing note and vitals reviewed.      Ortho Exam:   RIGHT shoulder:  Positive for pain and tenderness with palpation over the subcromial bursa.   Positive for signs of impingement with internal and external rotation.   Forward flexion is 0- 120 degrees, abduction is 0-120 degrees, external rotation is 0-40 degrees.   Rotator cuff function is fairly well preserved except impingement at 90 degrees.   Dugan's sign is positive. Neer sign is positive.   Sulcus sign is negative. Drop arm sign is negative.   Apprehension sign is negative.   Axillary nerve function is well preserved. Radial artery pulses are palpable.   There is no evidence of multidirectional instability.   The pain level is 8.  Upon palpation of the right trapezius there is a palpable taut band of contracted muscle fibers.    The area is significantly tender when pressure is placed on the nodule.    With palpation the patient's symptoms are reproduced.    There is a positive jump sign.  Sulcus sign is negative.   Drop arm sign is negative.   The pain level is 6.      Diagnostics:  Right shoulder xrays 4 views were performed at [facility] on 10/28/2020. These images were independently viewed and interpreted by myself, my impression as follows:  · Mild to moderate AC joint changes and glenohumeral changes.  Evidence of prior coronary artery bypass grafting.      Assessment:  Diagnoses and all orders for this visit:    1. Acute pain of right shoulder (Primary)  -     traMADol (ULTRAM) 50 MG tablet; Take 1-2 tablets by mouth 6-8 hours prn moderate-severe pain  Dispense: 40 tablet; Refill: 0  -     Ambulatory Referral to Home Health  -     Large Joint Arthrocentesis: R subacromial bursa  -     lidocaine (cardiac) (XYLOCAINE) injection 2 mL  -     methylPREDNISolone acetate (DEPO-medrol) injection 160 mg    2. Trigger point of right shoulder region  -     Ambulatory Referral to Home Health  -     Trigger point  injection: Right shoulder region  -     Lidocaine HCl (Cardiac) PF (XYLOCAINE) injection 1 mL  -     methylPREDNISolone acetate (DEPO-medrol) injection 80 mg          Large Joint Arthrocentesis: R subacromial bursa  Date/Time: 11/9/2020 9:58 AM  Consent given by: patient  Site marked: site marked  Timeout: Immediately prior to procedure a time out was called to verify the correct patient, procedure, equipment, support staff and site/side marked as required   Supporting Documentation  Indications: pain   Procedure Details  Location: shoulder - R subacromial bursa  Preparation: Patient was prepped and draped in the usual sterile fashion  Needle size: 25 G  Approach: anteromedial  Medications administered: 160 mg methylPREDNISolone acetate 80 MG/ML; 2 mL lidocaine (cardiac)  Patient tolerance: patient tolerated the procedure well with no immediate complications    Trigger point injection: Right shoulder region    Date/Time: 11/10/2020 5:19 AM  Performed by: Ramon Hein PA-C  Authorized by: Ramon Hein PA-C   Preparation: Patient was prepped and draped in the usual sterile fashion.  Local anesthesia used: yes  Anesthesia: local infiltration    Anesthesia:  Local anesthesia used: yes  Local anesthetic: Ethyl chloride spray.    Sedation:  Patient sedated: no    Patient tolerance: patient tolerated the procedure well with no immediate complications  Medications administered: 1 mL Lidocaine HCl (Cardiac)  MG/5ML; 80 mg methylPREDNISolone acetate 80 MG/ML        ?    Plan    Orders Placed This Encounter   Procedures   • Large Joint Arthrocentesis: R subacromial bursa   • Trigger point injection: Right shoulder region   • Ambulatory Referral to Home Health      · Management of an acute uncomplicated injury    · Discussion of orthopaedic goals and activities.  · Risk, benefits, and merits of treatment alternatives reviewed with the patient.  Discussed a trial of intra-articular steroid injection for  the shoulder and trigger point injection for the right shoulder region.  Patient would like to proceed with this treatment.  · Injected patient's right shoulder joint(s) with steroid from a(n) anteromedial approach.  Injected patient's right trapezius trigger point with steroid.  Patient tolerated the procedure well and no complications were encountered.  · Ice, heat, and/or modalities as beneficial  · Watch for signs and symptoms of infection  · Call or notify for any adverse effect from injection therapy  · Physical therapy referral given  · Patient is encouraged to call or return for any issues or concerns.  · Follow up in 3 months    Ramon Hein PA-C  Date of encounter: 11/09/2020     Electronically signed by Ramon Hein PA-C, 11/09/20, 8:22 AM EST.    EMR Dragon/Transcription disclaimer:  Much of this encounter note is an electronic transcription/translation of spoken language to printed text. The electronic translation of spoken language may permit erroneous, or at times, nonsensical words or phrases to be inadvertently transcribed; Although I have reviewed the note for such errors, some may still exist.

## 2022-09-17 ENCOUNTER — HOSPITAL ENCOUNTER (INPATIENT)
Dept: HOSPITAL 49 - FER | Age: 87
LOS: 3 days | Discharge: NURSING, REHAB OR LONG-TERM CARE | DRG: 193 | End: 2022-09-20
Attending: INTERNAL MEDICINE | Admitting: INTERNAL MEDICINE
Payer: MEDICARE

## 2022-09-17 VITALS — HEIGHT: 70.98 IN | BODY MASS INDEX: 24.48 KG/M2 | WEIGHT: 174.83 LBS

## 2022-09-17 DIAGNOSIS — Z66: ICD-10-CM

## 2022-09-17 DIAGNOSIS — R62.7: ICD-10-CM

## 2022-09-17 DIAGNOSIS — I27.82: ICD-10-CM

## 2022-09-17 DIAGNOSIS — Z82.49: ICD-10-CM

## 2022-09-17 DIAGNOSIS — I50.32: ICD-10-CM

## 2022-09-17 DIAGNOSIS — M25.559: ICD-10-CM

## 2022-09-17 DIAGNOSIS — Z88.2: ICD-10-CM

## 2022-09-17 DIAGNOSIS — Z79.899: ICD-10-CM

## 2022-09-17 DIAGNOSIS — J18.9: Primary | ICD-10-CM

## 2022-09-17 DIAGNOSIS — Z20.822: ICD-10-CM

## 2022-09-17 DIAGNOSIS — I13.0: ICD-10-CM

## 2022-09-17 DIAGNOSIS — S30.0XXA: ICD-10-CM

## 2022-09-17 DIAGNOSIS — J98.19: ICD-10-CM

## 2022-09-17 DIAGNOSIS — Z88.0: ICD-10-CM

## 2022-09-17 DIAGNOSIS — G93.41: ICD-10-CM

## 2022-09-17 DIAGNOSIS — Z79.82: ICD-10-CM

## 2022-09-17 DIAGNOSIS — I25.10: ICD-10-CM

## 2022-09-17 DIAGNOSIS — Z98.61: ICD-10-CM

## 2022-09-17 DIAGNOSIS — C34.91: ICD-10-CM

## 2022-09-17 DIAGNOSIS — R55: ICD-10-CM

## 2022-09-17 DIAGNOSIS — Z87.891: ICD-10-CM

## 2022-09-17 DIAGNOSIS — J96.01: ICD-10-CM
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
## 2022-09-17 DIAGNOSIS — F03.90: ICD-10-CM

## 2022-09-17 DIAGNOSIS — N18.30: ICD-10-CM

## 2022-09-17 DIAGNOSIS — E78.5: ICD-10-CM

## 2022-09-17 LAB
ALBUMIN SERPL-MCNC: 2.8 G/DL (ref 3.4–5)
ALKALINE PHOSHATASE: 96 U/L (ref 46–116)
ALT SERPL-CCNC: 44 U/L (ref 16–63)
AST: 35 U/L (ref 15–37)
BASOPHIL: 0.8 % (ref 0–2)
BILIRUBIN - TOTAL: 0.3 MG/DL (ref 0.2–1)
BUN SERPL-MCNC: 26 MG/DL (ref 7–18)
BUN/CREAT RATIO (CALC): 17.4 RATIO
CHLORIDE: 108 MMOL/L (ref 98–107)
CO2 (BICARBONATE): 29 MMOL/L (ref 21–32)
CREATININE: 1.49 MG/DL (ref 0.67–1.17)
D DIMER PPP FEU-MCNC: 17.03 UG/MLFEU (ref 0–0.41)
EOSINOPHIL: 3.6 % (ref 0–7)
GLOBULIN (CALCULATION): 4 G/DL
GLUCOSE SERPL-MCNC: 145 MG/DL (ref 74–106)
HCT: 39.1 % (ref 42–52)
HGB BLD-MCNC: 12.6 G/DL (ref 13.2–18)
INR PPP: 1.07 (ref 0.9–1.2)
LYMPHOCYTE: 6.8 % (ref 15–48)
MCH RBC QN AUTO: 30.6 PG (ref 25–31)
MCHC RBC AUTO-ENTMCNC: 32.2 G/DL (ref 32–36)
MCV: 94.9 FL (ref 78–100)
MONOCYTE: 7.7 % (ref 0–12)
MPV: 10.4 FL (ref 6–9.5)
NEUTROPHIL: 79.7 % (ref 41–80)
NRBC: 0
PLT: 199 K/UL (ref 150–400)
POTASSIUM: 4.3 MMOL/L (ref 3.5–5.1)
PROTHROMBIN TIME: 13.6 SECONDS (ref 11.9–13.9)
PTT: 24.2 SECONDS (ref 24.9–34.6)
RBC MORPHOLOGY: NORMAL
RBC: 4.12 M/UL (ref 4.7–6)
RDW: 13.5 % (ref 11.5–14)
TOTAL PROTEIN: 6.8 G/DL (ref 6.4–8.2)
WBC: 13.8 K/UL (ref 4–10.5)

## 2022-09-17 PROCEDURE — U0002 COVID-19 LAB TEST NON-CDC: HCPCS

## 2022-09-18 LAB
BASOPHIL: 0.6 % (ref 0–2)
BUN SERPL-MCNC: 25 MG/DL (ref 7–18)
BUN/CREAT RATIO (CALC): 19.1 RATIO
CHLORIDE: 108 MMOL/L (ref 98–107)
CO2 (BICARBONATE): 26 MMOL/L (ref 21–32)
CREATININE: 1.31 MG/DL (ref 0.67–1.17)
EOSINOPHIL: 3.8 % (ref 0–7)
GLUCOSE SERPL-MCNC: 160 MG/DL (ref 74–106)
HCT: 41.5 % (ref 42–52)
HGB BLD-MCNC: 13.4 G/DL (ref 13.2–18)
LYMPHOCYTE: 6.8 % (ref 15–48)
MCH RBC QN AUTO: 31.4 PG (ref 25–31)
MCHC RBC AUTO-ENTMCNC: 32.3 G/DL (ref 32–36)
MCV: 97.2 FL (ref 78–100)
MONOCYTE: 9 % (ref 0–12)
MPV: 10.4 FL (ref 6–9.5)
NEUTROPHIL: 79 % (ref 41–80)
NRBC: 0
PLT: 184 K/UL (ref 150–400)
POTASSIUM: 4.4 MMOL/L (ref 3.5–5.1)
RBC MORPHOLOGY: NORMAL
RBC: 4.27 M/UL (ref 4.7–6)
RDW: 14 % (ref 11.5–14)
WBC: 12.5 K/UL (ref 4–10.5)

## 2022-09-19 LAB
AMORPH URATE CRY #/AREA URNS HPF: (no result) /[HPF]
BACTERIA: (no result)
BASOPHIL: 0.9 % (ref 0–2)
BILIRUBIN: NEGATIVE MG/DL
BLOOD: (no result) ERY/UL
BUN SERPL-MCNC: 27 MG/DL (ref 7–18)
BUN/CREAT RATIO (CALC): 18.4 RATIO
CHLORIDE: 109 MMOL/L (ref 98–107)
CLARITY UR: CLEAR
CO2 (BICARBONATE): 28 MMOL/L (ref 21–32)
COLOR: YELLOW
CREATININE: 1.47 MG/DL (ref 0.67–1.17)
EOSINOPHIL: 5.8 % (ref 0–7)
GLUCOSE (U): NORMAL MG/DL
GLUCOSE SERPL-MCNC: 154 MG/DL (ref 74–106)
HCT: 35.7 % (ref 42–52)
HGB BLD-MCNC: 11.2 G/DL (ref 13.2–18)
LEUKOCYTES: NEGATIVE LEU/UL
LYMPHOCYTE: 5.5 % (ref 15–48)
MCH RBC QN AUTO: 30.5 PG (ref 25–31)
MCHC RBC AUTO-ENTMCNC: 31.4 G/DL (ref 32–36)
MCV: 97.3 FL (ref 78–100)
MONOCYTE: 9.4 % (ref 0–12)
MPV: 10.3 FL (ref 6–9.5)
NEUTROPHIL: 77.7 % (ref 41–80)
NITRITE: NEGATIVE MG/DL
NRBC: 0
PLT: 174 K/UL (ref 150–400)
POTASSIUM: 4.4 MMOL/L (ref 3.5–5.1)
PROTEIN: (no result) MG/DL
RBC MORPHOLOGY: NORMAL
RBC: 3.67 M/UL (ref 4.7–6)
RDW: 14.4 % (ref 11.5–14)
SPECIFIC GRAVITY: > 1.03 (ref 1–1.03)
SQUAMOUS EPITHELIAL CELLS: (no result)
URINARY RBC: (no result)
URINARY WBC: (no result)
UROBILINOGEN: 0.2 MG/DL (ref 0.2–1)
WBC: 11 K/UL (ref 4–10.5)

## 2022-09-20 ENCOUNTER — HOSPITAL ENCOUNTER (INPATIENT)
Dept: HOSPITAL 49 - FSNU | Age: 87
LOS: 6 days | Discharge: NURSING, REHAB OR LONG-TERM CARE | DRG: 193 | End: 2022-09-26
Attending: INTERNAL MEDICINE | Admitting: INTERNAL MEDICINE
Payer: MEDICARE

## 2022-09-20 VITALS — BODY MASS INDEX: 25.39 KG/M2 | HEIGHT: 70.98 IN | WEIGHT: 181.37 LBS

## 2022-09-20 DIAGNOSIS — K59.00: ICD-10-CM

## 2022-09-20 DIAGNOSIS — C34.90: ICD-10-CM

## 2022-09-20 DIAGNOSIS — J18.9: Primary | ICD-10-CM

## 2022-09-20 DIAGNOSIS — R55: ICD-10-CM

## 2022-09-20 DIAGNOSIS — I27.82: ICD-10-CM

## 2022-09-20 DIAGNOSIS — G93.41: ICD-10-CM

## 2022-09-20 DIAGNOSIS — Z20.822: ICD-10-CM

## 2022-09-20 DIAGNOSIS — M54.50: ICD-10-CM

## 2022-09-20 DIAGNOSIS — R62.7: ICD-10-CM

## 2022-09-20 DIAGNOSIS — N18.30: ICD-10-CM

## 2022-09-20 DIAGNOSIS — J96.01: ICD-10-CM

## 2022-09-20 DIAGNOSIS — I25.10: ICD-10-CM

## 2022-09-20 DIAGNOSIS — R10.2: ICD-10-CM

## 2022-09-20 PROCEDURE — G0009 ADMIN PNEUMOCOCCAL VACCINE: HCPCS

## 2022-09-20 PROCEDURE — U0002 COVID-19 LAB TEST NON-CDC: HCPCS

## 2022-09-21 LAB
BASOPHIL: 1.3 % (ref 0–2)
BUN SERPL-MCNC: 30 MG/DL (ref 7–18)
BUN/CREAT RATIO (CALC): 21.7 RATIO
CHLORIDE: 111 MMOL/L (ref 98–107)
CO2 (BICARBONATE): 28 MMOL/L (ref 21–32)
CREATININE: 1.38 MG/DL (ref 0.67–1.17)
EOSINOPHIL: 9 % (ref 0–7)
GLUCOSE SERPL-MCNC: 135 MG/DL (ref 74–106)
HCT: 37.1 % (ref 42–52)
HGB BLD-MCNC: 11.7 G/DL (ref 13.2–18)
LYMPHOCYTE: 5 % (ref 15–48)
MCH RBC QN AUTO: 31.1 PG (ref 25–31)
MCHC RBC AUTO-ENTMCNC: 31.5 G/DL (ref 32–36)
MCV: 98.7 FL (ref 78–100)
MONOCYTE: 7.8 % (ref 0–12)
MPV: 10.2 FL (ref 6–9.5)
NEUTROPHIL: 76.1 % (ref 41–80)
NRBC: 0
PLT: 189 K/UL (ref 150–400)
POTASSIUM: 4.7 MMOL/L (ref 3.5–5.1)
RBC MORPHOLOGY: NORMAL
RBC: 3.76 M/UL (ref 4.7–6)
RDW: 14.8 % (ref 11.5–14)
WBC: 10 K/UL (ref 4–10.5)

## 2022-09-26 NOTE — OUTREACH NOTE
AMBULATORY CASE MANAGEMENT NOTE    Name and Relationship of Patient/Support Person: Karyn Kitchen - Emergency Contact    Spoke with brodie Boss to [facility] in [facility].  They are sending for some additional antibiotic therapy and rehab for strengthening.    She will call with any needs that we can assist.       HELEN GARZA  Ambulatory Case Management    9/26/2022, 16:31 EDT

## 2022-09-29 PROBLEM — J96.02 ACUTE RESPIRATORY FAILURE WITH HYPOXIA AND HYPERCAPNIA: Status: ACTIVE | Noted: 2022-01-01

## 2022-09-29 PROBLEM — J96.01 ACUTE RESPIRATORY FAILURE WITH HYPOXIA AND HYPERCAPNIA: Status: ACTIVE | Noted: 2022-01-01

## 2022-09-30 PROBLEM — N18.31 STAGE 3A CHRONIC KIDNEY DISEASE: Status: ACTIVE | Noted: 2020-06-01

## 2022-09-30 PROBLEM — E11.65 TYPE 2 DIABETES MELLITUS WITH HYPERGLYCEMIA (HCC): Status: ACTIVE | Noted: 2022-01-01

## 2022-09-30 PROBLEM — J96.21 ACUTE ON CHRONIC RESPIRATORY FAILURE WITH HYPOXIA AND HYPERCAPNIA: Status: ACTIVE | Noted: 2022-01-01

## 2022-09-30 PROBLEM — D63.8 ANEMIA, CHRONIC DISEASE: Status: ACTIVE | Noted: 2022-01-01

## 2022-09-30 PROBLEM — I50.43 ACUTE ON CHRONIC COMBINED SYSTOLIC AND DIASTOLIC CONGESTIVE HEART FAILURE (HCC): Status: ACTIVE | Noted: 2022-01-01

## 2022-09-30 PROBLEM — J44.9 COPD MIXED TYPE: Status: ACTIVE | Noted: 2022-01-01

## 2022-09-30 PROBLEM — Z51.5 PALLIATIVE CARE BY SPECIALIST: Status: ACTIVE | Noted: 2022-01-01

## 2022-09-30 PROBLEM — C34.91: Status: ACTIVE | Noted: 2022-01-01

## 2022-09-30 PROBLEM — J96.22 ACUTE ON CHRONIC RESPIRATORY FAILURE WITH HYPOXIA AND HYPERCAPNIA (HCC): Status: ACTIVE | Noted: 2022-01-01

## 2022-10-01 NOTE — DISCHARGE SUMMARY
Discharge As      Date of Admisssion:  2022  Date of Death:  10/1/2022  Time of Death:  3:25 PM    Patient Care Team:  Ankur Allen MD as PCP - General (Family Medicine)  Naomy Brunson RN as Ambulatory  ([facility] [place])  Hernandez Spicer APRN as Nurse Practitioner (Cardiology)  Claudia Gonzales Jr., MD as Consulting Physician (Vascular Surgery)  Hector Godfrey MD as Attending Provider (Hospice and Palliative Medicine)    Final Diagnosis:     Metastatic primary lung cancer, right (HCC)    Alzheimer disease (HCC)    Acute on chronic respiratory failure with hypoxia and hypercapnia (HCC)    COPD mixed type (HCC)    Acute on chronic combined systolic and diastolic congestive heart failure (HCC)    Hypertension    PAD (peripheral artery disease) (HCC)    Hx of CABG    H/O aortic valve replacement    Anemia, chronic disease    Stage 3a chronic kidney disease (HCC)    Type 2 diabetes mellitus with hyperglycemia (HCC)    Palliative care by specialist      Hospital Course  Patient was a 88 y.o. male who presented to the ED 2022 with worsening shortness of breath.  According to notes in Epic patient has history of metastatic lung cancer.  Patient not getting any treatment.  Was in outside hospital for postobstructive pneumonia and pulmonary embolism.  Patient was discharged back to nursing home.  Apparently the morning of admission, the patient was less responsive.  EMS was called for cardiac arrest.  Patient found to be hypoxic on their arrival.  They gave him oxygen as well as diuretics with some improvement of symptoms.  The ED physician discussed with family and they did not want a ventilator and they did not want a BiPAP.  Patient is DNR. All family members wished palliative care.  I was called to admit the patient.  Hospice was consulted on the day of admission.  However, they were never able to see the patient and the family.  Please see my note from earlier today  at 1305 hrs.  I reviewed with the patient's grandson.  The patient's condition had deteriorated.  Subsequently, I was called that the patient's respirations ceased and no pulse palpable. No heart sounds audible. I pronounced the patient at 1525 hours.    Time: I spent 25 minutes on this discharge activity which included: face-to-face encounter with the patient, reviewing the data in the system, coordination of the care with the nursing staff as well as documentation and entering orders.       Hector Godfrey MD  Hospice and Palliative Medicine  10/01/22  15:48 EDT

## 2022-10-01 NOTE — PROGRESS NOTES
Palliative Care/Hospice Follow Up Note       LOS: 2 days   Patient Care Team:  Ankur Allen MD as PCP - General (Family Medicine)  Naomy Brunson RN as Ambulatory  ([facility])  Hernandez Spicer APRN as Nurse Practitioner (Cardiology)  Claudia Gonzales Jr., MD as Consulting Physician (Vascular Surgery)  Hector Godfrey MD as Attending Provider (Hospice and Palliative Medicine)    Chief Complaint:  Metastatic lung cancer; COPD    Interval History:     Patient Complaints: None  Patient Denies:  None  History taken from: Ulices and RN  Review of Systems:  As above.    Palliative Performance Scale  Palliative Performance Scale Score: 10%  [place] Symptom Assessment System Revised  Pain Score: 4   ESAS Tiredness Score: Worst possible tiredness  ESAS Nausea Score: No nausea  ESAS Depression Score: unable to assess  ESAS Anxiety Score: 2  ESAS Drowsiness Score: Worst possible drowsiness  ESAS Lack of Appetite Score: Worst lack of appetite  ESAS Wellbeing Score: unable to assess  ESAS Dyspnea Score: 4  ESAS Other Problem Score: unable to assess  ESAS Source of Information: healthcare professional caregiver  ESAS Intervention: medicated/see MAR  ESAS Intervention Response: tolerated    Vital Signs  Temp:  [96.8 °F (36 °C)-100.4 °F (38 °C)] 100.4 °F (38 °C)  Heart Rate:  [104-120] 104  Resp:  [14-22] 14  BP: (97)/(56) 97/56  Device (Oxygen Therapy): nasal cannulaFlow (L/min):  [2] 2SpO2:  [66 %-71 %] 71 %    Physical Exam:  General Appearance:    Not awake and in no acute distress lying slightly on his left side, chronically ill-appearing elderly male   Throat:   No oral lesions, oral mucosa somewhat moist   Neck:   No adenopathy, supple, trachea midline   Lungs:     Clear to auscultation with slightly coarse/rhonchi, respirations diminished with increased inspiratory effort/agonal     Heart:    Regular rhythm and tachycardia    Abdomen:     Occasional bowel sounds, soft and  non-tender, non-distended   Extremities:   No edema, pale and ashen and cyanosis evident    Pulses:   Radial pulses palpable and equal bilaterally          Results Review:     I reviewed the patient's new clinical results.    Medication Reviewed.    Assessment & Plan       Metastatic primary lung cancer, right (HCC)    Alzheimer disease (HCC)    Acute on chronic respiratory failure with hypoxia and hypercapnia (HCC)    COPD mixed type (HCC)    Acute on chronic combined systolic and diastolic congestive heart failure (HCC)    Hypertension    PAD (peripheral artery disease) (HCC)    Hx of CABG    H/O aortic valve replacement    Anemia, chronic disease    Stage 3a chronic kidney disease (HCC)    Type 2 diabetes mellitus with hyperglycemia (HCC)    Palliative care by specialist      I reviewed with the patient's grandson at bedside.  I reviewed with the RN.  The patient is worse with agonal respirations noted.  However, with medications previously administered, the patient appears comfortable.  The patient has required glycopyrrolate for airway congestion.  The patient has required 3 doses of 4 mg morphine and 1 dose of 1 mg Dilaudid, 6 doses yesterday, and 4 doses of 2 mg Ativan, 6 doses yesterday, thus far today.  The patient's grandson is aware of the patient's terminal condition.  I explained his worsening condition.  I answered all of his questions.    Hospice was consulted on the day of admission and their evaluation is still pending.    Plan for disposition:  Pending    Hector Godfrey MD  Hospice and Palliative Medicine  10/01/22  15:44 EDT

## 2022-10-01 NOTE — H&P
Palliative Care/Hospice Admit/Consult Note       Referring Provider: Byron Toledo MD   Reason for Consultation: Palliative/hospice care  Date of Admission:  9/29/2022    Patient Care Team:  Ankur Allen MD as PCP - General (Family Medicine)  Naomy Brunson RN as Ambulatory  ([facility])  Hernandez Spicer APRN as Nurse Practitioner (Cardiology)  Claudia Gonzales Jr., MD as Consulting Physician (Vascular Surgery)  Hector Godfrey MD as Attending Provider (Hospice and Palliative Medicine)    Chief complaint: Metastatic lung cancer    History of present illness:  The patient is a 88 y.o. male who presented to the ED 9/29/2022 with worsening shortness of breath.  According to notes in Epic patient has history of metastatic lung cancer.  Patient not getting any treatment.  Was in outside hospital for postobstructive pneumonia and pulmonary embolism.  Patient was discharged back to nursing home.  Apparently the morning of admission, the patient was less responsive.  EMS was called for cardiac arrest.  Patient found to be hypoxic on their arrival.  They gave him oxygen as well as diuretics with improvement of symptoms.  Patient states he continues to feel short of breath and ill.  Patient also having back pain.    The ED physician discussed with family and they do not want a ventilator and they do not want to BiPAP.  Patient is DNR.  Discussed with multiple family members and all wished palliative care.  I was called to admit the patient.    At the time of my evaluation, no family present.  The patient was lying flat and was not awake.  Audible rhonchi present.  No review of systems obtainable.    Review of Systems  Review of systems could not be obtained due to   patient sedation status. patient unresponsive.    Palliative Performance Scale  Palliative Performance Scale Score: 10%  [place] Symptom Assessment System Revised  Pain Score: 4   ESAS Tiredness Score: 9  ESAS Nausea  Score: No nausea  ESAS Depression Score: unable to assess  ESAS Anxiety Score: 3  ESAS Drowsiness Score: Worst possible drowsiness  ESAS Lack of Appetite Score: Worst lack of appetite  ESAS Wellbeing Score: unable to assess  ESAS Dyspnea Score: 3  ESAS Other Problem Score: Best possible response  ESAS Source of Information: healthcare professional caregiver  ESAS Intervention: medicated/see MAR  ESAS Intervention Response: tolerated    History  Past Medical History:   Diagnosis Date   • Abdominal aortic aneurysm without rupture ([facility])    • Acute bronchitis, unspecified    • Adjustment disorder with mixed anxiety and depressed mood    • Alzheimer disease ([facility])    • Alzheimer's disease with late onset ([facility])    • 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    • Atherosclerosis of native arteries of extremities with intermittent claudication, right leg ([facility])    • Cardiac arrhythmia, unspecified    • Causalgia of right lower limb    • Cervicalgia    • Chronic kidney disease, stage 3 ([facility])     MODERATE   • Chronic kidney disease, stage 3b ([facility])    • Diabetes ([facility])    • Dizziness and giddiness    • Dyspnea, unspecified    • Edema, unspecified    • Essential (primary) hypertension    • Fecal impaction ([facility])    • Gout, unspecified    • Hallucinations, unspecified    • History of falling    • History of pneumonia     HOSPITALIZATION AT [facility]   • Idiopathic chronic gout, unspecified site, without tophus (tophi)    • Localized edema    • Male erectile dysfunction, unspecified    • Mixed hyperlipidemia    • Myocardial infarct ([facility]) 01/2021    ADMITTED ONCE   • Nonrheumatic aortic (valve) stenosis    • Other long term (current) drug therapy    • Other symptoms and signs involving cognitive functions and awareness    • Pain in right shoulder    • Presence of prosthetic heart valve    • Rash and other nonspecific skin eruption    • Skin cancer (melanoma) ([facility])    • Slow transit constipation    •  Syncope and collapse    • Type 2 diabetes mellitus without complications (HCC)    ,   Past Surgical History:   Procedure Laterality Date   • ANGIOPLASTY  2020    SFA POP ANGIOPLASTY WITH STENTING   • CARDIAC VALVE REPLACEMENT  10/2015   • CAROTID ENDARTERECTOMY Bilateral 1980s   • CORONARY ARTERY BYPASS GRAFT  10/2015   • SKIN CANCER EXCISION      MELANOMA; FROM MID BACK -DR ZARATE   • SUBCLAVIAN ARTERY STENT  10/2015   ,   Family History   Problem Relation Age of Onset   • Dislocations Other    • Hypertension Other    • Stroke Mother         CAUSE OF DEATH   • Heart attack Father         CAUSE OF DEATH   • Cancer Brother         UNSPECIFIED TYPE   • Stroke Brother 75    and   Social History     Socioeconomic History   • Marital status:    Tobacco Use   • Smoking status: Former Smoker     Packs/day: 1.00     Years: 47.00     Pack years: 47.00     Types: Cigarettes     Start date:      Quit date:      Years since quittin.   • Smokeless tobacco: Never Used   Vaping Use   • Vaping Use: Never used   Substance and Sexual Activity   • Alcohol use: Not Currently   • Drug use: Never   • Sexual activity: Defer     E-cigarette/Vaping   • E-cigarette/Vaping Use Never User      E-cigarette/Vaping Substances   • Nicotine No    • THC No    • CBD No    • Flavoring No      E-cigarette/Vaping Devices   • Disposable No    • Pre-filled or Refillable Cartridge No    • Refillable Tank No    • Pre-filled Pod No         Allergy Hydrocodone-acetaminophen, Penicillins, Cefaclor, Penicillin g, and Sulfa antibiotics    Vital Signs   Temp:  [96.8 °F (36 °C)-97.6 °F (36.4 °C)] 96.8 °F (36 °C)  Heart Rate:  [101-120] 120  Resp:  [18-22] 22  BP: ()/(56-75) 97/56  Device (Oxygen Therapy): nasal cannulaFlow (L/min):  [2] 2SpO2:  [66 %-83 %] 66 %    Physical Exam:  General Appearance:   Not awake and appears in no acute distress lying flat on his back with head of bed elevated 10-20 degrees, chronically ill-appearing  elderly male   Head:    Normocephalic, without obvious abnormality, atraumatic   Eyes:            Lids and lashes normal, conjunctivae and sclerae normal, no icterus   Ears:    Ears appear intact with no abnormalities noted   Throat:   No oral lesions, oral mucosa somewhat moist   Neck:   No adenopathy, supple, trachea midline, no thyromegaly   Back:     No scoliosis present   Lungs:     Clear to auscultation with scattered expiratory greater than inspiratory rhonchi, respirations diminished and somewhat paradoxical with partial obstructive upper airway relieved with jaw thrust     Heart:    Regular rhythm and tachycardia        Abdomen:   Occasional bowel sounds, soft and non-tender, non-distended   Genitalia:    Deferred   Extremities:  No edema, pale and ashen and cyanosis evident    Pulses:  Radial pulses palpable and equal bilaterally   Skin:   No bleeding         Neurologic:  Not awake to test      Results Review:   I reviewed the patient's new clinical results.    Impression:      Metastatic primary lung cancer, right (HCC)    Alzheimer disease (HCC)    Acute on chronic respiratory failure with hypoxia and hypercapnia (HCC)    COPD mixed type (HCC)    Acute on chronic combined systolic and diastolic congestive heart failure (HCC)    Hypertension    PAD (peripheral artery disease) (HCC)    Hx of CABG    H/O aortic valve replacement    Anemia, chronic disease    Stage 3a chronic kidney disease (HCC)    Type 2 diabetes mellitus with hyperglycemia (HCC)    Palliative care by specialist      Plan:  I reviewed with the ED physician by phone 9/29/2022.  I reviewed the patient's previous medical records and present admission records including labs and radiographs.  I reviewed with the RN.  With medications previously administered, the patient appears comfortable.  The patient has required glycopyrrolate for airway congestion.  The patient has required 2 doses of 4 mg morphine, 2 doses yesterday, and 1 dose of 1 mg  and 1 dose of 2 mg Ativan, 2 doses yesterday, thus far today.  Medications will be continued and adjusted as needed for symptom management for comfort.  No attempts at resuscitation will be made.    Hospice consult requested at the time of admission and is pending.      Hector Godfrey MD  Hospice and Palliative Medicine  09/30/22  20:52 EDT

## 2022-10-03 NOTE — PROGRESS NOTES
Case Management Discharge Note      Final Note: The patient  on 10/1/22 @ 15:25. RADHA Gan RN CCP.         Selected Continued Care - Discharged on 10/1/2022 Admission date: 2022 - Discharge disposition:     Destination    No services have been selected for the patient.              Durable Medical Equipment    No services have been selected for the patient.              Dialysis/Infusion    No services have been selected for the patient.              Home Medical Care    No services have been selected for the patient.              Therapy    No services have been selected for the patient.              Community Resources    No services have been selected for the patient.              Community & DME    No services have been selected for the patient.                Selected Continued Care - Episodes Includes selections from active Coordinated Care Management episodes    Chronic Care Management Episode start date: 2021   There are no active outsourced providers for this episode.                    Final Discharge Disposition Code: 20 -

## 2022-10-04 LAB
BACTERIA SPEC AEROBE CULT: NORMAL
BACTERIA SPEC AEROBE CULT: NORMAL

## 2024-05-29 NOTE — PROGRESS NOTES
Ok to give list of psychiatrist and can call number on the back of the card to get in network therapist.    For physical therapy needs eval before we can order it    thanks   tickled

## 2025-05-03 NOTE — OUTREACH NOTE
- renal diet but no binders: Phosphorus doing well 4.3  - calcitriol 0.75mcg TTS     AMBULATORY CASE MANAGEMENT NOTE    Name and Relationship of Patient/Support Person:  -     Arturo, home health nurse called stating that he was in seeing Mr. Morales.  Wanted to report 2.4lb weight gain since Saturday, O2 sat 93%, NO SOA, slight swelling in extremities/abd, 93HR and he is being monitored by Cardiology for Telehealth.      Encouraged him, since he is being monitored by Telehealth with cardiology, to weight daily and keep a log.  He is not having any over symptoms and could be due to extra salt with Easter dinner/lunch.  Any changes with his water pill/diuretic must be made by cardiology.  Stable.     Education Documentation  Weight Monitoring, taught by Helen Brunson RN at 4/18/2022 10:44 AM.  Learner: Caregiver  Readiness: Acceptance  Method: Explanation  Response: Verbalizes Understanding    Provider Follow-Up, taught by Helen Brunson RN at 4/18/2022 10:44 AM.  Learner: Caregiver  Readiness: Acceptance  Method: Explanation  Response: Verbalizes Understanding    Medication Management, taught by Helen Brunson RN at 4/18/2022 10:44 AM.  Learner: Caregiver  Readiness: Acceptance  Method: Explanation  Response: Verbalizes Understanding    Diet Modification, taught by Helen Brunson RN at 4/18/2022 10:44 AM.  Learner: Caregiver  Readiness: Acceptance  Method: Explanation  Response: Verbalizes Understanding          HELEN GARZA  Ambulatory Case Management    4/18/2022, 10:44 EDT